# Patient Record
Sex: MALE | Race: WHITE | NOT HISPANIC OR LATINO | Employment: OTHER | URBAN - METROPOLITAN AREA
[De-identification: names, ages, dates, MRNs, and addresses within clinical notes are randomized per-mention and may not be internally consistent; named-entity substitution may affect disease eponyms.]

---

## 2023-08-07 ENCOUNTER — HOSPITAL ENCOUNTER (OUTPATIENT)
Dept: DATA CONVERSION | Facility: HOSPITAL | Age: 78
Discharge: HOME | End: 2023-08-07

## 2023-08-07 DIAGNOSIS — R31.0 GROSS HEMATURIA: ICD-10-CM

## 2023-08-07 LAB
BACTERIA SPEC CULT: NORMAL
REPORT STATUS -LH SQ DATA CONVERSION: NORMAL
SERVICE CMNT-IMP: NORMAL
SPECIMEN SOURCE: NORMAL

## 2023-08-14 ENCOUNTER — HOSPITAL ENCOUNTER (OUTPATIENT)
Dept: DATA CONVERSION | Facility: HOSPITAL | Age: 78
Discharge: HOME | End: 2023-08-14

## 2023-08-14 DIAGNOSIS — N39.0 URINARY TRACT INFECTION, SITE NOT SPECIFIED: ICD-10-CM

## 2023-08-14 LAB
BACTERIA SPEC CULT: NORMAL
BACTERIA UR QL AUTO: NEGATIVE
BILIRUB UR QL STRIP.AUTO: NEGATIVE
CLARITY UR: CLEAR
COLOR UR: ABNORMAL
GLUCOSE UR STRIP.AUTO-MCNC: NEGATIVE MG/DL
HGB UR QL STRIP.AUTO: 2 /HPF (ref 0–3)
HGB UR QL: NEGATIVE
HYALINE CASTS UR QL AUTO: ABNORMAL /LPF
KETONES UR QL STRIP.AUTO: NEGATIVE
LEUKOCYTE ESTERASE UR QL STRIP.AUTO: ABNORMAL
MICROSCOPIC (UA): ABNORMAL
NITRITE UR QL STRIP.AUTO: NEGATIVE
PH UR STRIP.AUTO: 7 [PH] (ref 4.6–8)
PROT UR STRIP.AUTO-MCNC: NEGATIVE MG/DL
REPORT STATUS -LH SQ DATA CONVERSION: NORMAL
SERVICE CMNT-IMP: NORMAL
SP GR UR STRIP.AUTO: 1.01 (ref 1–1.03)
SPECIMEN SOURCE: NORMAL
SQUAMOUS UR QL AUTO: ABNORMAL /HPF
URINE CULTURE: ABNORMAL
UROBILINOGEN UR QL STRIP.AUTO: NORMAL MG/DL (ref 0–1)
WBC #/AREA URNS AUTO: 6 /HPF (ref 0–3)

## 2023-10-25 DIAGNOSIS — I10 PRIMARY HYPERTENSION: ICD-10-CM

## 2023-10-25 RX ORDER — METOPROLOL SUCCINATE 50 MG/1
50 TABLET, EXTENDED RELEASE ORAL DAILY
Qty: 90 TABLET | Refills: 0 | Status: SHIPPED | OUTPATIENT
Start: 2023-10-25 | End: 2024-01-22

## 2023-11-25 DIAGNOSIS — I48.91 ATRIAL FIBRILLATION, UNSPECIFIED TYPE (MULTI): Primary | ICD-10-CM

## 2023-11-27 RX ORDER — WARFARIN SODIUM 5 MG/1
TABLET ORAL
Qty: 180 TABLET | Refills: 0 | Status: SHIPPED | OUTPATIENT
Start: 2023-11-27 | End: 2024-02-19

## 2023-12-22 DIAGNOSIS — B37.9 CANDIDIASIS: Primary | ICD-10-CM

## 2023-12-22 RX ORDER — NYSTATIN 100000 [USP'U]/G
1 POWDER TOPICAL 2 TIMES DAILY
Qty: 30 G | Refills: 1 | Status: SHIPPED | OUTPATIENT
Start: 2023-12-22

## 2023-12-22 RX ORDER — NYSTATIN 100000 [USP'U]/G
1 POWDER TOPICAL 2 TIMES DAILY
COMMUNITY
End: 2023-12-22 | Stop reason: SDUPTHER

## 2024-01-10 ENCOUNTER — OFFICE VISIT (OUTPATIENT)
Dept: PRIMARY CARE | Facility: CLINIC | Age: 79
End: 2024-01-10
Payer: MEDICARE

## 2024-01-10 VITALS
HEART RATE: 64 BPM | DIASTOLIC BLOOD PRESSURE: 78 MMHG | BODY MASS INDEX: 27.98 KG/M2 | WEIGHT: 195 LBS | OXYGEN SATURATION: 97 % | SYSTOLIC BLOOD PRESSURE: 126 MMHG

## 2024-01-10 DIAGNOSIS — E78.00 HIGH CHOLESTEROL: ICD-10-CM

## 2024-01-10 DIAGNOSIS — E03.9 ACQUIRED HYPOTHYROIDISM: ICD-10-CM

## 2024-01-10 DIAGNOSIS — J42 CHRONIC BRONCHITIS, UNSPECIFIED CHRONIC BRONCHITIS TYPE (MULTI): Primary | ICD-10-CM

## 2024-01-10 DIAGNOSIS — I48.11 LONGSTANDING PERSISTENT ATRIAL FIBRILLATION (MULTI): ICD-10-CM

## 2024-01-10 LAB
POC INR: 2.4
POC PROTHROMBIN TIME: 28.5
POCT INTERNATIONAL NORMALIZATION RATIO: 2.4
POCT PROTHROMBIN TIME: 28.5 SECONDS

## 2024-01-10 PROCEDURE — 85610 PROTHROMBIN TIME: CPT | Mod: QW | Performed by: FAMILY MEDICINE

## 2024-01-10 PROCEDURE — 99214 OFFICE O/P EST MOD 30 MIN: CPT | Performed by: FAMILY MEDICINE

## 2024-01-10 PROCEDURE — 1126F AMNT PAIN NOTED NONE PRSNT: CPT | Performed by: FAMILY MEDICINE

## 2024-01-10 PROCEDURE — 1036F TOBACCO NON-USER: CPT | Performed by: FAMILY MEDICINE

## 2024-01-10 PROCEDURE — 1159F MED LIST DOCD IN RCRD: CPT | Performed by: FAMILY MEDICINE

## 2024-01-10 RX ORDER — ROSUVASTATIN CALCIUM 5 MG/1
5 TABLET, COATED ORAL DAILY
COMMUNITY
Start: 2018-12-19

## 2024-01-10 RX ORDER — WARFARIN 7.5 MG/1
7.5 TABLET ORAL
COMMUNITY

## 2024-01-10 RX ORDER — ACETAMINOPHEN 500 MG
5000 TABLET ORAL DAILY
COMMUNITY
Start: 2016-11-08

## 2024-01-10 RX ORDER — LEVOTHYROXINE SODIUM 112 UG/1
112 TABLET ORAL
COMMUNITY
End: 2024-03-13

## 2024-01-10 RX ORDER — WARFARIN 2.5 MG/1
2.5 TABLET ORAL EVERY 24 HOURS
COMMUNITY

## 2024-01-10 RX ORDER — LISINOPRIL 2.5 MG/1
2.5 TABLET ORAL DAILY
COMMUNITY
End: 2024-03-13

## 2024-01-10 ASSESSMENT — ENCOUNTER SYMPTOMS
BLOOD IN STOOL: 0
LOSS OF SENSATION IN FEET: 0
PALPITATIONS: 0
ACTIVITY CHANGE: 0
DIZZINESS: 0
OCCASIONAL FEELINGS OF UNSTEADINESS: 0
HEADACHES: 0
DIARRHEA: 0
COUGH: 0
SHORTNESS OF BREATH: 0
CONSTIPATION: 0
CHEST TIGHTNESS: 0

## 2024-01-10 ASSESSMENT — PATIENT HEALTH QUESTIONNAIRE - PHQ9
1. LITTLE INTEREST OR PLEASURE IN DOING THINGS: NOT AT ALL
2. FEELING DOWN, DEPRESSED OR HOPELESS: NOT AT ALL
SUM OF ALL RESPONSES TO PHQ9 QUESTIONS 1 AND 2: 0

## 2024-01-10 ASSESSMENT — PAIN SCALES - GENERAL: PAINLEVEL: 0-NO PAIN

## 2024-01-10 NOTE — PROGRESS NOTES
Subjective   Patient ID: Royce Briggs is a 78 y.o. male who presents for Hypertension and INR Check.    He presents today for follow-up on his chronic medical conditions.  States overall he is feeling well.  His INR was 2.4 today.    He states he is able to do what he needs to do.  Yesterday woke with some pain shooting down his leg.  He took some over-the-counter medications and now that is gone today.  He is getting around well.         Review of Systems   Constitutional:  Negative for activity change.   Respiratory:  Negative for cough, chest tightness and shortness of breath.    Cardiovascular:  Negative for chest pain, palpitations and leg swelling.   Gastrointestinal:  Negative for blood in stool, constipation and diarrhea.   Neurological:  Negative for dizziness and headaches.       Objective   /78   Pulse 64   Wt 88.5 kg (195 lb)   SpO2 97%   BMI 27.98 kg/m²     Physical Exam  Constitutional:       Appearance: Normal appearance.   Neck:      Thyroid: No thyromegaly or thyroid tenderness.      Vascular: No carotid bruit.   Cardiovascular:      Rate and Rhythm: Normal rate and regular rhythm.      Heart sounds: No murmur heard.  Pulmonary:      Effort: Pulmonary effort is normal.      Breath sounds: Normal breath sounds.   Musculoskeletal:      Cervical back: Neck supple.   Neurological:      Mental Status: He is alert.   Psychiatric:         Mood and Affect: Mood normal.         Assessment/Plan   Diagnoses and all orders for this visit:  Chronic bronchitis, unspecified chronic bronchitis type (CMS/HCC)  Longstanding persistent atrial fibrillation (CMS/HCC)  -     POCT PT/INR  High cholesterol  -     Lipid Panel; Future  -     Comprehensive Metabolic Panel; Future  Acquired hypothyroidism  -     TSH with reflex to Free T4 if abnormal; Future  Doing well.  He will continue his current medications.  I will call him with lab results.

## 2024-01-18 ENCOUNTER — LAB (OUTPATIENT)
Dept: LAB | Facility: LAB | Age: 79
End: 2024-01-18
Payer: MEDICARE

## 2024-01-18 DIAGNOSIS — E03.9 ACQUIRED HYPOTHYROIDISM: ICD-10-CM

## 2024-01-18 DIAGNOSIS — E78.00 HIGH CHOLESTEROL: ICD-10-CM

## 2024-01-18 LAB
ALBUMIN SERPL BCP-MCNC: 4.1 G/DL (ref 3.4–5)
ALP SERPL-CCNC: 80 U/L (ref 33–136)
ALT SERPL W P-5'-P-CCNC: 17 U/L (ref 10–52)
ANION GAP SERPL CALC-SCNC: 11 MMOL/L (ref 10–20)
AST SERPL W P-5'-P-CCNC: 22 U/L (ref 9–39)
BILIRUB SERPL-MCNC: 0.8 MG/DL (ref 0–1.2)
BUN SERPL-MCNC: 18 MG/DL (ref 6–23)
CALCIUM SERPL-MCNC: 9.1 MG/DL (ref 8.6–10.3)
CHLORIDE SERPL-SCNC: 105 MMOL/L (ref 98–107)
CHOLEST SERPL-MCNC: 228 MG/DL (ref 0–199)
CHOLESTEROL/HDL RATIO: 5
CO2 SERPL-SCNC: 28 MMOL/L (ref 21–32)
CREAT SERPL-MCNC: 0.92 MG/DL (ref 0.5–1.3)
EGFRCR SERPLBLD CKD-EPI 2021: 85 ML/MIN/1.73M*2
GLUCOSE SERPL-MCNC: 91 MG/DL (ref 74–99)
HDLC SERPL-MCNC: 45.9 MG/DL
LDLC SERPL CALC-MCNC: 154 MG/DL
NON HDL CHOLESTEROL: 182 MG/DL (ref 0–149)
POTASSIUM SERPL-SCNC: 4.1 MMOL/L (ref 3.5–5.3)
PROT SERPL-MCNC: 6.6 G/DL (ref 6.4–8.2)
SODIUM SERPL-SCNC: 140 MMOL/L (ref 136–145)
TRIGL SERPL-MCNC: 139 MG/DL (ref 0–149)
TSH SERPL-ACNC: 1.78 MIU/L (ref 0.44–3.98)
VLDL: 28 MG/DL (ref 0–40)

## 2024-01-18 PROCEDURE — 36415 COLL VENOUS BLD VENIPUNCTURE: CPT

## 2024-01-21 DIAGNOSIS — I10 PRIMARY HYPERTENSION: ICD-10-CM

## 2024-01-22 RX ORDER — METOPROLOL SUCCINATE 50 MG/1
50 TABLET, EXTENDED RELEASE ORAL DAILY
Qty: 90 TABLET | Refills: 1 | Status: SHIPPED | OUTPATIENT
Start: 2024-01-22

## 2024-02-18 DIAGNOSIS — I48.91 ATRIAL FIBRILLATION, UNSPECIFIED TYPE (MULTI): ICD-10-CM

## 2024-02-19 RX ORDER — WARFARIN SODIUM 5 MG/1
TABLET ORAL
Qty: 180 TABLET | Refills: 0 | Status: SHIPPED | OUTPATIENT
Start: 2024-02-19 | End: 2024-05-16

## 2024-03-06 NOTE — TELEPHONE ENCOUNTER
Last OV 1/10  No future appts scheduled  Last refill  11/27/23  Last INR 1/10/24   2.4    
What Type Of Note Output Would You Prefer (Optional)?: Standard Output
What Is The Reason For Today's Visit?: Full Body Skin Examination
What Is The Reason For Today's Visit? (Being Monitored For X): concerning skin lesions on an annual basis

## 2024-03-13 DIAGNOSIS — I48.11 LONGSTANDING PERSISTENT ATRIAL FIBRILLATION (MULTI): Primary | ICD-10-CM

## 2024-03-13 DIAGNOSIS — E03.9 HYPOTHYROIDISM, UNSPECIFIED: ICD-10-CM

## 2024-03-13 RX ORDER — LEVOTHYROXINE SODIUM 112 UG/1
112 TABLET ORAL
Qty: 90 TABLET | Refills: 3 | Status: SHIPPED | OUTPATIENT
Start: 2024-03-13 | End: 2025-03-08

## 2024-03-13 RX ORDER — LISINOPRIL 2.5 MG/1
2.5 TABLET ORAL DAILY
Qty: 90 TABLET | Refills: 1 | Status: SHIPPED | OUTPATIENT
Start: 2024-03-13

## 2024-05-07 ENCOUNTER — TELEPHONE (OUTPATIENT)
Dept: PRIMARY CARE | Facility: CLINIC | Age: 79
End: 2024-05-07
Payer: MEDICARE

## 2024-05-07 NOTE — TELEPHONE ENCOUNTER
Pt calls, he states he is still having some issues with his stools, some are soft, when he passes gas sometimes some leakage. He has been using the Imodium but not on a daily basis- when he does not take it he gets the watery release when he passes gas. He has a dog that had loose stools and he wants to know if he may have picked something up from the dog. He wants to know if there is testing that needs to be done - please advise.  OR do you want the patient to come in for an office appointment?

## 2024-05-16 DIAGNOSIS — I48.91 ATRIAL FIBRILLATION, UNSPECIFIED TYPE (MULTI): ICD-10-CM

## 2024-05-16 RX ORDER — WARFARIN SODIUM 5 MG/1
10 TABLET ORAL
Qty: 180 TABLET | Refills: 0 | Status: SHIPPED | OUTPATIENT
Start: 2024-05-16

## 2024-05-24 ENCOUNTER — LAB (OUTPATIENT)
Dept: LAB | Facility: LAB | Age: 79
End: 2024-05-24
Payer: MEDICARE

## 2024-05-24 DIAGNOSIS — R19.5 OTHER FECAL ABNORMALITIES: Primary | ICD-10-CM

## 2024-05-24 PROCEDURE — 87329 GIARDIA AG IA: CPT

## 2024-05-24 PROCEDURE — 87328 CRYPTOSPORIDIUM AG IA: CPT

## 2024-05-24 PROCEDURE — 87506 IADNA-DNA/RNA PROBE TQ 6-11: CPT

## 2024-05-25 LAB

## 2024-05-28 LAB
CRYPTOSP AG STL QL IA: NEGATIVE
G LAMBLIA AG STL QL IA: NEGATIVE
O+P STL MICRO: NEGATIVE

## 2024-07-01 ENCOUNTER — APPOINTMENT (OUTPATIENT)
Dept: RADIOLOGY | Facility: HOSPITAL | Age: 79
End: 2024-07-01
Payer: MEDICARE

## 2024-07-01 ENCOUNTER — HOSPITAL ENCOUNTER (EMERGENCY)
Facility: HOSPITAL | Age: 79
Discharge: OTHER INSTITUTION | End: 2024-07-02
Payer: MEDICARE

## 2024-07-01 DIAGNOSIS — N30.01 ACUTE CYSTITIS WITH HEMATURIA: ICD-10-CM

## 2024-07-01 DIAGNOSIS — N32.89 BLADDER MASS: Primary | ICD-10-CM

## 2024-07-01 LAB
ALBUMIN SERPL BCP-MCNC: 4.6 G/DL (ref 3.4–5)
ALP SERPL-CCNC: 85 U/L (ref 33–136)
ALT SERPL W P-5'-P-CCNC: 23 U/L (ref 10–52)
ANION GAP SERPL CALC-SCNC: 12 MMOL/L (ref 10–20)
APPEARANCE UR: ABNORMAL
AST SERPL W P-5'-P-CCNC: 32 U/L (ref 9–39)
BACTERIA #/AREA URNS AUTO: ABNORMAL /HPF
BASOPHILS # BLD AUTO: 0.05 X10*3/UL (ref 0–0.1)
BASOPHILS NFR BLD AUTO: 0.6 %
BILIRUB SERPL-MCNC: 0.6 MG/DL (ref 0–1.2)
BILIRUB UR STRIP.AUTO-MCNC: NEGATIVE MG/DL
BUN SERPL-MCNC: 16 MG/DL (ref 6–23)
CALCIUM SERPL-MCNC: 10 MG/DL (ref 8.6–10.3)
CHLORIDE SERPL-SCNC: 101 MMOL/L (ref 98–107)
CO2 SERPL-SCNC: 29 MMOL/L (ref 21–32)
COLOR UR: ABNORMAL
CREAT SERPL-MCNC: 0.98 MG/DL (ref 0.5–1.3)
EGFRCR SERPLBLD CKD-EPI 2021: 79 ML/MIN/1.73M*2
EOSINOPHIL # BLD AUTO: 0.45 X10*3/UL (ref 0–0.4)
EOSINOPHIL NFR BLD AUTO: 5.8 %
ERYTHROCYTE [DISTWIDTH] IN BLOOD BY AUTOMATED COUNT: 12.8 % (ref 11.5–14.5)
GLUCOSE SERPL-MCNC: 99 MG/DL (ref 74–99)
GLUCOSE UR STRIP.AUTO-MCNC: NORMAL MG/DL
HCT VFR BLD AUTO: 37.1 % (ref 41–52)
HGB BLD-MCNC: 12.5 G/DL (ref 13.5–17.5)
IMM GRANULOCYTES # BLD AUTO: 0.02 X10*3/UL (ref 0–0.5)
IMM GRANULOCYTES NFR BLD AUTO: 0.3 % (ref 0–0.9)
INR PPP: 1 (ref 0.9–1.1)
KETONES UR STRIP.AUTO-MCNC: NEGATIVE MG/DL
LEUKOCYTE ESTERASE UR QL STRIP.AUTO: ABNORMAL
LYMPHOCYTES # BLD AUTO: 1.98 X10*3/UL (ref 0.8–3)
LYMPHOCYTES NFR BLD AUTO: 25.5 %
MCH RBC QN AUTO: 33.4 PG (ref 26–34)
MCHC RBC AUTO-ENTMCNC: 33.7 G/DL (ref 32–36)
MCV RBC AUTO: 99 FL (ref 80–100)
MONOCYTES # BLD AUTO: 0.63 X10*3/UL (ref 0.05–0.8)
MONOCYTES NFR BLD AUTO: 8.1 %
NEUTROPHILS # BLD AUTO: 4.62 X10*3/UL (ref 1.6–5.5)
NEUTROPHILS NFR BLD AUTO: 59.7 %
NITRITE UR QL STRIP.AUTO: NEGATIVE
NRBC BLD-RTO: 0 /100 WBCS (ref 0–0)
PH UR STRIP.AUTO: 6.5 [PH]
PLATELET # BLD AUTO: 259 X10*3/UL (ref 150–450)
POTASSIUM SERPL-SCNC: 4 MMOL/L (ref 3.5–5.3)
PROT SERPL-MCNC: 7.9 G/DL (ref 6.4–8.2)
PROT UR STRIP.AUTO-MCNC: ABNORMAL MG/DL
PROTHROMBIN TIME: 11.6 SECONDS (ref 9.8–12.8)
RBC # BLD AUTO: 3.74 X10*6/UL (ref 4.5–5.9)
RBC # UR STRIP.AUTO: ABNORMAL /UL
RBC #/AREA URNS AUTO: >20 /HPF
SODIUM SERPL-SCNC: 138 MMOL/L (ref 136–145)
SP GR UR STRIP.AUTO: 1
UROBILINOGEN UR STRIP.AUTO-MCNC: NORMAL MG/DL
WBC # BLD AUTO: 7.8 X10*3/UL (ref 4.4–11.3)
WBC #/AREA URNS AUTO: ABNORMAL /HPF
WBC CLUMPS #/AREA URNS AUTO: ABNORMAL /HPF

## 2024-07-01 PROCEDURE — 74177 CT ABD & PELVIS W/CONTRAST: CPT | Performed by: STUDENT IN AN ORGANIZED HEALTH CARE EDUCATION/TRAINING PROGRAM

## 2024-07-01 PROCEDURE — 2500000004 HC RX 250 GENERAL PHARMACY W/ HCPCS (ALT 636 FOR OP/ED): Performed by: PHYSICIAN ASSISTANT

## 2024-07-01 PROCEDURE — 85610 PROTHROMBIN TIME: CPT | Performed by: PHYSICIAN ASSISTANT

## 2024-07-01 PROCEDURE — 96365 THER/PROPH/DIAG IV INF INIT: CPT

## 2024-07-01 PROCEDURE — 36415 COLL VENOUS BLD VENIPUNCTURE: CPT | Performed by: PHYSICIAN ASSISTANT

## 2024-07-01 PROCEDURE — 87086 URINE CULTURE/COLONY COUNT: CPT | Mod: GENLAB | Performed by: PHYSICIAN ASSISTANT

## 2024-07-01 PROCEDURE — 2550000001 HC RX 255 CONTRASTS: Performed by: PHYSICIAN ASSISTANT

## 2024-07-01 PROCEDURE — 80053 COMPREHEN METABOLIC PANEL: CPT | Performed by: PHYSICIAN ASSISTANT

## 2024-07-01 PROCEDURE — 85025 COMPLETE CBC W/AUTO DIFF WBC: CPT | Performed by: PHYSICIAN ASSISTANT

## 2024-07-01 PROCEDURE — 81003 URINALYSIS AUTO W/O SCOPE: CPT | Performed by: PHYSICIAN ASSISTANT

## 2024-07-01 PROCEDURE — 99285 EMERGENCY DEPT VISIT HI MDM: CPT | Mod: 25

## 2024-07-01 PROCEDURE — 74177 CT ABD & PELVIS W/CONTRAST: CPT

## 2024-07-01 RX ORDER — CEFTRIAXONE 1 G/50ML
1 INJECTION, SOLUTION INTRAVENOUS ONCE
Status: COMPLETED | OUTPATIENT
Start: 2024-07-01 | End: 2024-07-01

## 2024-07-01 RX ORDER — CEPHALEXIN 250 MG/1
500 CAPSULE ORAL ONCE
Status: DISCONTINUED | OUTPATIENT
Start: 2024-07-01 | End: 2024-07-01

## 2024-07-01 ASSESSMENT — PAIN SCALES - GENERAL
PAINLEVEL_OUTOF10: 0 - NO PAIN
PAINLEVEL_OUTOF10: 0 - NO PAIN

## 2024-07-01 ASSESSMENT — PAIN - FUNCTIONAL ASSESSMENT: PAIN_FUNCTIONAL_ASSESSMENT: 0-10

## 2024-07-02 ENCOUNTER — HOSPITAL ENCOUNTER (INPATIENT)
Facility: HOSPITAL | Age: 79
LOS: 1 days | Discharge: HOME | DRG: 669 | End: 2024-07-04
Attending: INTERNAL MEDICINE | Admitting: FAMILY MEDICINE
Payer: MEDICARE

## 2024-07-02 ENCOUNTER — ANESTHESIA EVENT (OUTPATIENT)
Dept: OPERATING ROOM | Facility: HOSPITAL | Age: 79
End: 2024-07-02
Payer: MEDICARE

## 2024-07-02 ENCOUNTER — ANESTHESIA (OUTPATIENT)
Dept: OPERATING ROOM | Facility: HOSPITAL | Age: 79
End: 2024-07-02
Payer: MEDICARE

## 2024-07-02 ENCOUNTER — APPOINTMENT (OUTPATIENT)
Dept: CARDIOLOGY | Facility: HOSPITAL | Age: 79
DRG: 669 | End: 2024-07-02
Payer: MEDICARE

## 2024-07-02 VITALS
SYSTOLIC BLOOD PRESSURE: 150 MMHG | HEART RATE: 78 BPM | RESPIRATION RATE: 18 BRPM | OXYGEN SATURATION: 97 % | BODY MASS INDEX: 25.18 KG/M2 | DIASTOLIC BLOOD PRESSURE: 78 MMHG | TEMPERATURE: 97.7 F | WEIGHT: 170 LBS | HEIGHT: 69 IN

## 2024-07-02 DIAGNOSIS — R01.1 HEART MURMUR: ICD-10-CM

## 2024-07-02 DIAGNOSIS — D49.4 BLADDER TUMOR: Primary | ICD-10-CM

## 2024-07-02 PROBLEM — M51.36 DDD (DEGENERATIVE DISC DISEASE), LUMBAR: Status: ACTIVE | Noted: 2024-07-02

## 2024-07-02 PROBLEM — E03.9 HYPOTHYROIDISM: Status: ACTIVE | Noted: 2023-02-28

## 2024-07-02 PROBLEM — I48.91 ATRIAL FIBRILLATION (MULTI): Status: ACTIVE | Noted: 2024-07-02

## 2024-07-02 PROBLEM — N32.89 BLADDER MASS: Status: ACTIVE | Noted: 2024-07-02

## 2024-07-02 PROBLEM — I10 HYPERTENSION: Status: ACTIVE | Noted: 2023-02-28

## 2024-07-02 PROBLEM — E78.00 HYPERCHOLESTEROLEMIA: Status: ACTIVE | Noted: 2024-07-02

## 2024-07-02 PROBLEM — K40.90 LEFT INGUINAL HERNIA: Status: ACTIVE | Noted: 2024-07-02

## 2024-07-02 PROBLEM — M87.059 AVASCULAR NECROSIS OF BONE OF HIP (MULTI): Status: ACTIVE | Noted: 2024-07-02

## 2024-07-02 PROBLEM — M51.369 DDD (DEGENERATIVE DISC DISEASE), LUMBAR: Status: ACTIVE | Noted: 2024-07-02

## 2024-07-02 LAB
ANION GAP SERPL CALC-SCNC: 10 MMOL/L (ref 10–20)
ATRIAL RATE: 53 BPM
BUN SERPL-MCNC: 16 MG/DL (ref 6–23)
CALCIUM SERPL-MCNC: 9 MG/DL (ref 8.6–10.3)
CHLORIDE SERPL-SCNC: 103 MMOL/L (ref 98–107)
CO2 SERPL-SCNC: 30 MMOL/L (ref 21–32)
CREAT SERPL-MCNC: 1.01 MG/DL (ref 0.5–1.3)
EGFRCR SERPLBLD CKD-EPI 2021: 76 ML/MIN/1.73M*2
ERYTHROCYTE [DISTWIDTH] IN BLOOD BY AUTOMATED COUNT: 12.8 % (ref 11.5–14.5)
GLUCOSE SERPL-MCNC: 98 MG/DL (ref 74–99)
HCT VFR BLD AUTO: 34.4 % (ref 41–52)
HGB BLD-MCNC: 11.3 G/DL (ref 13.5–17.5)
MAGNESIUM SERPL-MCNC: 2 MG/DL (ref 1.6–2.4)
MCH RBC QN AUTO: 32.8 PG (ref 26–34)
MCHC RBC AUTO-ENTMCNC: 32.8 G/DL (ref 32–36)
MCV RBC AUTO: 100 FL (ref 80–100)
NRBC BLD-RTO: 0 /100 WBCS (ref 0–0)
P AXIS: 12 DEGREES
P OFFSET: 198 MS
P ONSET: 144 MS
PLATELET # BLD AUTO: 207 X10*3/UL (ref 150–450)
POTASSIUM SERPL-SCNC: 3.9 MMOL/L (ref 3.5–5.3)
PR INTERVAL: 152 MS
Q ONSET: 220 MS
QRS COUNT: 8 BEATS
QRS DURATION: 92 MS
QT INTERVAL: 410 MS
QTC CALCULATION(BAZETT): 384 MS
QTC FREDERICIA: 393 MS
R AXIS: -11 DEGREES
RBC # BLD AUTO: 3.44 X10*6/UL (ref 4.5–5.9)
SODIUM SERPL-SCNC: 139 MMOL/L (ref 136–145)
T AXIS: -3 DEGREES
T OFFSET: 425 MS
VENTRICULAR RATE: 53 BPM
WBC # BLD AUTO: 7.2 X10*3/UL (ref 4.4–11.3)

## 2024-07-02 PROCEDURE — 36415 COLL VENOUS BLD VENIPUNCTURE: CPT | Performed by: INTERNAL MEDICINE

## 2024-07-02 PROCEDURE — 3700000002 HC GENERAL ANESTHESIA TIME - EACH INCREMENTAL 1 MINUTE: Performed by: STUDENT IN AN ORGANIZED HEALTH CARE EDUCATION/TRAINING PROGRAM

## 2024-07-02 PROCEDURE — 2500000004 HC RX 250 GENERAL PHARMACY W/ HCPCS (ALT 636 FOR OP/ED): Performed by: INTERNAL MEDICINE

## 2024-07-02 PROCEDURE — 2720000007 HC OR 272 NO HCPCS: Performed by: STUDENT IN AN ORGANIZED HEALTH CARE EDUCATION/TRAINING PROGRAM

## 2024-07-02 PROCEDURE — 93005 ELECTROCARDIOGRAM TRACING: CPT

## 2024-07-02 PROCEDURE — 2500000005 HC RX 250 GENERAL PHARMACY W/O HCPCS: Performed by: STUDENT IN AN ORGANIZED HEALTH CARE EDUCATION/TRAINING PROGRAM

## 2024-07-02 PROCEDURE — 2500000005 HC RX 250 GENERAL PHARMACY W/O HCPCS: Performed by: NURSE ANESTHETIST, CERTIFIED REGISTERED

## 2024-07-02 PROCEDURE — 7100000001 HC RECOVERY ROOM TIME - INITIAL BASE CHARGE: Performed by: STUDENT IN AN ORGANIZED HEALTH CARE EDUCATION/TRAINING PROGRAM

## 2024-07-02 PROCEDURE — 80048 BASIC METABOLIC PNL TOTAL CA: CPT | Performed by: INTERNAL MEDICINE

## 2024-07-02 PROCEDURE — G0378 HOSPITAL OBSERVATION PER HR: HCPCS

## 2024-07-02 PROCEDURE — 85027 COMPLETE CBC AUTOMATED: CPT | Performed by: INTERNAL MEDICINE

## 2024-07-02 PROCEDURE — 3600000003 HC OR TIME - INITIAL BASE CHARGE - PROCEDURE LEVEL THREE: Performed by: STUDENT IN AN ORGANIZED HEALTH CARE EDUCATION/TRAINING PROGRAM

## 2024-07-02 PROCEDURE — 0TBB8ZZ EXCISION OF BLADDER, VIA NATURAL OR ARTIFICIAL OPENING ENDOSCOPIC: ICD-10-PCS | Performed by: STUDENT IN AN ORGANIZED HEALTH CARE EDUCATION/TRAINING PROGRAM

## 2024-07-02 PROCEDURE — 7100000002 HC RECOVERY ROOM TIME - EACH INCREMENTAL 1 MINUTE: Performed by: STUDENT IN AN ORGANIZED HEALTH CARE EDUCATION/TRAINING PROGRAM

## 2024-07-02 PROCEDURE — 99223 1ST HOSP IP/OBS HIGH 75: CPT | Performed by: INTERNAL MEDICINE

## 2024-07-02 PROCEDURE — 3700000001 HC GENERAL ANESTHESIA TIME - INITIAL BASE CHARGE: Performed by: STUDENT IN AN ORGANIZED HEALTH CARE EDUCATION/TRAINING PROGRAM

## 2024-07-02 PROCEDURE — 99222 1ST HOSP IP/OBS MODERATE 55: CPT | Performed by: STUDENT IN AN ORGANIZED HEALTH CARE EDUCATION/TRAINING PROGRAM

## 2024-07-02 PROCEDURE — 83735 ASSAY OF MAGNESIUM: CPT | Performed by: NURSE PRACTITIONER

## 2024-07-02 PROCEDURE — 52235 CYSTOSCOPY AND TREATMENT: CPT | Performed by: STUDENT IN AN ORGANIZED HEALTH CARE EDUCATION/TRAINING PROGRAM

## 2024-07-02 PROCEDURE — 3600000008 HC OR TIME - EACH INCREMENTAL 1 MINUTE - PROCEDURE LEVEL THREE: Performed by: STUDENT IN AN ORGANIZED HEALTH CARE EDUCATION/TRAINING PROGRAM

## 2024-07-02 PROCEDURE — 2500000004 HC RX 250 GENERAL PHARMACY W/ HCPCS (ALT 636 FOR OP/ED): Performed by: NURSE ANESTHETIST, CERTIFIED REGISTERED

## 2024-07-02 PROCEDURE — 2500000001 HC RX 250 WO HCPCS SELF ADMINISTERED DRUGS (ALT 637 FOR MEDICARE OP): Performed by: INTERNAL MEDICINE

## 2024-07-02 RX ORDER — DOCUSATE SODIUM 100 MG/1
100 CAPSULE, LIQUID FILLED ORAL 2 TIMES DAILY
Status: DISCONTINUED | OUTPATIENT
Start: 2024-07-02 | End: 2024-07-04 | Stop reason: HOSPADM

## 2024-07-02 RX ORDER — ONDANSETRON HYDROCHLORIDE 2 MG/ML
4 INJECTION, SOLUTION INTRAVENOUS EVERY 8 HOURS PRN
Status: DISCONTINUED | OUTPATIENT
Start: 2024-07-02 | End: 2024-07-04 | Stop reason: HOSPADM

## 2024-07-02 RX ORDER — ACETAMINOPHEN 325 MG/1
650 TABLET ORAL EVERY 4 HOURS PRN
Status: DISCONTINUED | OUTPATIENT
Start: 2024-07-02 | End: 2024-07-04 | Stop reason: HOSPADM

## 2024-07-02 RX ORDER — DEXTROSE MONOHYDRATE AND SODIUM CHLORIDE 5; .9 G/100ML; G/100ML
50 INJECTION, SOLUTION INTRAVENOUS CONTINUOUS
Status: DISCONTINUED | OUTPATIENT
Start: 2024-07-02 | End: 2024-07-04 | Stop reason: HOSPADM

## 2024-07-02 RX ORDER — GUAIFENESIN 600 MG/1
600 TABLET, EXTENDED RELEASE ORAL EVERY 12 HOURS PRN
Status: DISCONTINUED | OUTPATIENT
Start: 2024-07-02 | End: 2024-07-04 | Stop reason: HOSPADM

## 2024-07-02 RX ORDER — GUAIFENESIN/DEXTROMETHORPHAN 100-10MG/5
5 SYRUP ORAL EVERY 4 HOURS PRN
Status: DISCONTINUED | OUTPATIENT
Start: 2024-07-02 | End: 2024-07-04 | Stop reason: HOSPADM

## 2024-07-02 RX ORDER — CHOLECALCIFEROL (VITAMIN D3) 125 MCG
5000 CAPSULE ORAL DAILY
Status: DISCONTINUED | OUTPATIENT
Start: 2024-07-02 | End: 2024-07-04 | Stop reason: HOSPADM

## 2024-07-02 RX ORDER — ACETAMINOPHEN 650 MG/1
650 SUPPOSITORY RECTAL EVERY 4 HOURS PRN
Status: DISCONTINUED | OUTPATIENT
Start: 2024-07-02 | End: 2024-07-04 | Stop reason: HOSPADM

## 2024-07-02 RX ORDER — PROPOFOL 10 MG/ML
INJECTION, EMULSION INTRAVENOUS AS NEEDED
Status: DISCONTINUED | OUTPATIENT
Start: 2024-07-02 | End: 2024-07-02

## 2024-07-02 RX ORDER — CEFTRIAXONE 1 G/50ML
1 INJECTION, SOLUTION INTRAVENOUS EVERY 12 HOURS
Status: DISCONTINUED | OUTPATIENT
Start: 2024-07-02 | End: 2024-07-04 | Stop reason: HOSPADM

## 2024-07-02 RX ORDER — ALUMINUM HYDROXIDE, MAGNESIUM HYDROXIDE, AND SIMETHICONE 1200; 120; 1200 MG/30ML; MG/30ML; MG/30ML
30 SUSPENSION ORAL EVERY 6 HOURS PRN
Status: DISCONTINUED | OUTPATIENT
Start: 2024-07-02 | End: 2024-07-04 | Stop reason: HOSPADM

## 2024-07-02 RX ORDER — LEVOTHYROXINE SODIUM 112 UG/1
112 TABLET ORAL
Status: DISCONTINUED | OUTPATIENT
Start: 2024-07-02 | End: 2024-07-04 | Stop reason: HOSPADM

## 2024-07-02 RX ORDER — METOPROLOL SUCCINATE 50 MG/1
50 TABLET, EXTENDED RELEASE ORAL DAILY
Status: DISCONTINUED | OUTPATIENT
Start: 2024-07-02 | End: 2024-07-04 | Stop reason: HOSPADM

## 2024-07-02 RX ORDER — SODIUM CHLORIDE 0.9 G/100ML
IRRIGANT IRRIGATION AS NEEDED
Status: DISCONTINUED | OUTPATIENT
Start: 2024-07-02 | End: 2024-07-02 | Stop reason: HOSPADM

## 2024-07-02 RX ORDER — ROSUVASTATIN CALCIUM 10 MG/1
5 TABLET, COATED ORAL DAILY
Status: DISCONTINUED | OUTPATIENT
Start: 2024-07-02 | End: 2024-07-04 | Stop reason: HOSPADM

## 2024-07-02 RX ORDER — CALCIUM CARBONATE 200(500)MG
500 TABLET,CHEWABLE ORAL 4 TIMES DAILY PRN
Status: DISCONTINUED | OUTPATIENT
Start: 2024-07-02 | End: 2024-07-04 | Stop reason: HOSPADM

## 2024-07-02 RX ORDER — ACETAMINOPHEN 160 MG/5ML
650 SOLUTION ORAL EVERY 4 HOURS PRN
Status: DISCONTINUED | OUTPATIENT
Start: 2024-07-02 | End: 2024-07-04 | Stop reason: HOSPADM

## 2024-07-02 RX ORDER — NYSTATIN 100000 [USP'U]/G
1 POWDER TOPICAL 2 TIMES DAILY
Status: DISCONTINUED | OUTPATIENT
Start: 2024-07-02 | End: 2024-07-04 | Stop reason: HOSPADM

## 2024-07-02 RX ORDER — ONDANSETRON 4 MG/1
4 TABLET, FILM COATED ORAL EVERY 8 HOURS PRN
Status: DISCONTINUED | OUTPATIENT
Start: 2024-07-02 | End: 2024-07-04 | Stop reason: HOSPADM

## 2024-07-02 RX ORDER — LIDOCAINE HCL/PF 100 MG/5ML
SYRINGE (ML) INTRAVENOUS AS NEEDED
Status: DISCONTINUED | OUTPATIENT
Start: 2024-07-02 | End: 2024-07-02

## 2024-07-02 RX ORDER — ONDANSETRON HYDROCHLORIDE 2 MG/ML
INJECTION, SOLUTION INTRAVENOUS AS NEEDED
Status: DISCONTINUED | OUTPATIENT
Start: 2024-07-02 | End: 2024-07-02

## 2024-07-02 RX ORDER — LISINOPRIL 5 MG/1
2.5 TABLET ORAL DAILY
Status: DISCONTINUED | OUTPATIENT
Start: 2024-07-02 | End: 2024-07-04 | Stop reason: HOSPADM

## 2024-07-02 SDOH — SOCIAL STABILITY: SOCIAL INSECURITY: ABUSE: ADULT

## 2024-07-02 SDOH — SOCIAL STABILITY: SOCIAL INSECURITY: DOES ANYONE TRY TO KEEP YOU FROM HAVING/CONTACTING OTHER FRIENDS OR DOING THINGS OUTSIDE YOUR HOME?: NO

## 2024-07-02 SDOH — SOCIAL STABILITY: SOCIAL INSECURITY: HAS ANYONE EVER THREATENED TO HURT YOUR FAMILY OR YOUR PETS?: NO

## 2024-07-02 SDOH — SOCIAL STABILITY: SOCIAL INSECURITY: ARE YOU OR HAVE YOU BEEN THREATENED OR ABUSED PHYSICALLY, EMOTIONALLY, OR SEXUALLY BY ANYONE?: NO

## 2024-07-02 SDOH — SOCIAL STABILITY: SOCIAL INSECURITY: DO YOU FEEL UNSAFE GOING BACK TO THE PLACE WHERE YOU ARE LIVING?: NO

## 2024-07-02 SDOH — SOCIAL STABILITY: SOCIAL INSECURITY: WERE YOU ABLE TO COMPLETE ALL THE BEHAVIORAL HEALTH SCREENINGS?: YES

## 2024-07-02 SDOH — SOCIAL STABILITY: SOCIAL INSECURITY: ARE THERE ANY APPARENT SIGNS OF INJURIES/BEHAVIORS THAT COULD BE RELATED TO ABUSE/NEGLECT?: NO

## 2024-07-02 SDOH — SOCIAL STABILITY: SOCIAL INSECURITY: HAVE YOU HAD ANY THOUGHTS OF HARMING ANYONE ELSE?: NO

## 2024-07-02 SDOH — HEALTH STABILITY: MENTAL HEALTH: CURRENT SMOKER: 0

## 2024-07-02 SDOH — SOCIAL STABILITY: SOCIAL INSECURITY: DO YOU FEEL ANYONE HAS EXPLOITED OR TAKEN ADVANTAGE OF YOU FINANCIALLY OR OF YOUR PERSONAL PROPERTY?: NO

## 2024-07-02 SDOH — SOCIAL STABILITY: SOCIAL INSECURITY: HAVE YOU HAD THOUGHTS OF HARMING ANYONE ELSE?: NO

## 2024-07-02 ASSESSMENT — ACTIVITIES OF DAILY LIVING (ADL)
ADEQUATE_TO_COMPLETE_ADL: YES
JUDGMENT_ADEQUATE_SAFELY_COMPLETE_DAILY_ACTIVITIES: YES
BATHING: INDEPENDENT
DRESSING YOURSELF: INDEPENDENT
LACK_OF_TRANSPORTATION: NO
ASSISTIVE_DEVICE: EYEGLASSES
LACK_OF_TRANSPORTATION: NO
HEARING - LEFT EAR: DIFFICULTY WITH NOISE
WALKS IN HOME: INDEPENDENT
GROOMING: INDEPENDENT
TOILETING: INDEPENDENT
HEARING - RIGHT EAR: DIFFICULTY WITH NOISE
PATIENT'S MEMORY ADEQUATE TO SAFELY COMPLETE DAILY ACTIVITIES?: YES
FEEDING YOURSELF: INDEPENDENT

## 2024-07-02 ASSESSMENT — COGNITIVE AND FUNCTIONAL STATUS - GENERAL
MOBILITY SCORE: 24
MOBILITY SCORE: 24
DAILY ACTIVITIY SCORE: 24
DAILY ACTIVITIY SCORE: 24
MOBILITY SCORE: 24
DAILY ACTIVITIY SCORE: 24
PATIENT BASELINE BEDBOUND: NO

## 2024-07-02 ASSESSMENT — ENCOUNTER SYMPTOMS
CARDIOVASCULAR NEGATIVE: 1
ABDOMINAL DISTENTION: 0
RESPIRATORY NEGATIVE: 1
COUGH: 0
HEMATURIA: 1
HEMATOLOGIC/LYMPHATIC NEGATIVE: 1
EYES NEGATIVE: 1
WEAKNESS: 0
CONSTITUTIONAL NEGATIVE: 1
ABDOMINAL PAIN: 0
ENDOCRINE NEGATIVE: 1
SHORTNESS OF BREATH: 0
GASTROINTESTINAL NEGATIVE: 1
DIZZINESS: 0
NEUROLOGICAL NEGATIVE: 1
PSYCHIATRIC NEGATIVE: 1
ALLERGIC/IMMUNOLOGIC NEGATIVE: 1
CHILLS: 0
MUSCULOSKELETAL NEGATIVE: 1
FEVER: 0
PALPITATIONS: 0

## 2024-07-02 ASSESSMENT — COLUMBIA-SUICIDE SEVERITY RATING SCALE - C-SSRS
2. HAVE YOU ACTUALLY HAD ANY THOUGHTS OF KILLING YOURSELF?: NO
6. HAVE YOU EVER DONE ANYTHING, STARTED TO DO ANYTHING, OR PREPARED TO DO ANYTHING TO END YOUR LIFE?: NO
1. IN THE PAST MONTH, HAVE YOU WISHED YOU WERE DEAD OR WISHED YOU COULD GO TO SLEEP AND NOT WAKE UP?: NO

## 2024-07-02 ASSESSMENT — PAIN - FUNCTIONAL ASSESSMENT
PAIN_FUNCTIONAL_ASSESSMENT: 0-10
PAIN_FUNCTIONAL_ASSESSMENT: 0-10

## 2024-07-02 ASSESSMENT — PATIENT HEALTH QUESTIONNAIRE - PHQ9
2. FEELING DOWN, DEPRESSED OR HOPELESS: NOT AT ALL
SUM OF ALL RESPONSES TO PHQ9 QUESTIONS 1 & 2: 0
1. LITTLE INTEREST OR PLEASURE IN DOING THINGS: NOT AT ALL

## 2024-07-02 ASSESSMENT — PAIN SCALES - GENERAL
PAINLEVEL_OUTOF10: 0 - NO PAIN
PAINLEVEL_OUTOF10: 2
PAINLEVEL_OUTOF10: 0 - NO PAIN
PAINLEVEL_OUTOF10: 0 - NO PAIN

## 2024-07-02 ASSESSMENT — LIFESTYLE VARIABLES
HOW OFTEN DO YOU HAVE A DRINK CONTAINING ALCOHOL: 2-4 TIMES A MONTH
HOW OFTEN DO YOU HAVE 6 OR MORE DRINKS ON ONE OCCASION: NEVER
HOW MANY STANDARD DRINKS CONTAINING ALCOHOL DO YOU HAVE ON A TYPICAL DAY: 1 OR 2
AUDIT-C TOTAL SCORE: 2
AUDIT-C TOTAL SCORE: 2
SKIP TO QUESTIONS 9-10: 1

## 2024-07-02 NOTE — ANESTHESIA PREPROCEDURE EVALUATION
Patient: Royce Briggs    Procedure Information       Date/Time: 07/02/24 1530    Procedure: Cystoscopy with Ablation Tissue    Location: [facility] / [facility]    Surgeons: Nayeli Bowling MD          Vitals:    07/02/24 1431   BP: 153/81   Pulse: 62   Resp:    Temp: 37.1 °C (98.8 °F)   SpO2: 96%       Past Surgical History:   Procedure Laterality Date    CARDIAC CATHETERIZATION  04/15/2014    Cardiac Cath Procedure Outcome:    COLONOSCOPY W/ POLYPECTOMY  06/27/2024    HERNIA REPAIR  04/15/2014    Inguinal Hernia Repair     Past Medical History:   Diagnosis Date    Atrial fibrillation     Longstanding/Persistant    Bladder tumor 07/01/2024    Right anterolateral bladder dome  2.1 x 1.7 cm    Cardiac murmur     Colon polyp 06/27/2024    COPD (chronic obstructive pulmonary disease) (Multi)     DDD (degenerative disc disease), lumbar     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    Hematospermia     Hematuria 07/01/2024    History of backache 01/14/2014    History of depression 01/14/2014    History of esophageal ulcer 01/14/2014    History of mumps 01/14/2014    History of rectal bleeding     History of rubella 01/14/2014    HLD (hyperlipidemia)     HTN (hypertension)     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    Avascular necrosis of hip, left    Normal cardiac stress test 01/14/2014    On Coumadin for atrial fibrillation     Personal history of other diseases of the circulatory system     History of cardiac disorder    Rhinitis, chronic     Tick bite 04/09/2022    Ulcer 01/14/2014    Undescended testicle     Unspecified abdominal hernia without obstruction or gangrene 01/14/2014    Hernia       Current Facility-Administered Medications:     [Transfer Hold] acetaminophen (Tylenol) tablet 650 mg, 650 mg, oral, q4h PRN **OR** [Transfer Hold] acetaminophen (Tylenol) oral liquid 650 mg, 650 mg, oral, q4h PRN **OR** [Transfer Hold] acetaminophen (Tylenol) suppository 650 mg, 650 mg, rectal, q4h PRN,  Myriam Mattson MD    [Transfer Hold] alum-mag hydroxide-simeth (Mylanta) 200-200-20 mg/5 mL oral suspension 30 mL, 30 mL, oral, q6h PRN, Myriam Mattson MD    [Transfer Hold] calcium carbonate (Tums) chewable tablet 500 mg, 500 mg, oral, 4x daily PRN, Myriam Mattson MD    [Transfer Hold] cefTRIAXone (Rocephin) IVPB 1 g, 1 g, intravenous, q12h, Myriam Mattson MD, Stopped at 07/02/24 1112    [Transfer Hold] cholecalciferol (Vitamin D-3) capsule 125 mcg, 5,000 Units, oral, Daily, Myriam Mattson MD    D5 % and 0.9 % sodium chloride infusion, 50 mL/hr, intravenous, Continuous, Myriam Mattson MD, Last Rate: 50 mL/hr at 07/02/24 0559, 50 mL/hr at 07/02/24 0559    [Transfer Hold] dextromethorphan-guaifenesin (Robitussin DM)  mg/5 mL oral liquid 5 mL, 5 mL, oral, q4h PRN, Myriam Mattson MD    [Transfer Hold] docusate sodium (Colace) capsule 100 mg, 100 mg, oral, BID, Myriam Mattson MD    [Transfer Hold] guaiFENesin (Mucinex) 12 hr tablet 600 mg, 600 mg, oral, q12h PRN, Myriam Mattson MD    [Transfer Hold] levothyroxine (Synthroid, Levoxyl) tablet 112 mcg, 112 mcg, oral, Daily before breakfast, Myriam Mattson MD, 112 mcg at 07/02/24 0601    [Transfer Hold] lisinopril tablet 2.5 mg, 2.5 mg, oral, Daily, Myriam Mattson MD    [Transfer Hold] metoprolol succinate XL (Toprol-XL) 24 hr tablet 50 mg, 50 mg, oral, Daily, Myriam Mattson MD    [Transfer Hold] nystatin (Mycostatin) 100,000 unit/gram powder 1 Application, 1 Application, Topical, BID, Myriam Mattson MD    [Transfer Hold] ondansetron (Zofran) tablet 4 mg, 4 mg, oral, q8h PRN **OR** [Transfer Hold] ondansetron (Zofran) injection 4 mg, 4 mg, intravenous, q8h PRN, Myriam Mattson MD    [Transfer Hold] rosuvastatin (Crestor) tablet 5 mg, 5 mg, oral, Daily, Myriam Mattson MD  Prior to Admission medications    Medication Sig Start Date End Date Taking? Authorizing Provider   cholecalciferol (Vitamin D-3) 5,000 Units  tablet Take 1 tablet (5,000 Units) by mouth once daily. 11/8/16  Yes Historical Provider, MD   levothyroxine (Synthroid) 112 mcg tablet TAKE 1 TABLET BY MOUTH EVERY DAY IN THE MORNING ON EMPTY STOMACH FOR 90 DAYS 3/13/24 3/8/25 Yes Alex Hummel MD   lisinopril 2.5 mg tablet TAKE 1 TABLET BY MOUTH EVERY DAY 3/13/24  Yes Alex Hummel MD   m-vit/folic acid/D3/K2/[id number] (STAGES MEN'S MULTI-VITAMIN ORAL) once every 24 hours.   Yes Historical Provider, MD   metoprolol succinate XL (Toprol-XL) 50 mg 24 hr tablet TAKE 1 TABLET BY MOUTH EVERY DAY 1/22/24  Yes Alex Hummel MD   Nyamyc 100,000 unit/gram powder Apply 1 Application topically 2 times a day. 12/22/23  Yes Alex Hummel MD   rosuvastatin (Crestor) 5 mg tablet Take 1 tablet (5 mg) by mouth once daily. 12/19/18  Yes Historical Provider, MD   ubidecarenone (COENZYME Q10, BULK, MISC) Take 200 mg by mouth.   Yes Historical Provider, MD   warfarin (Coumadin) 2.5 mg tablet Take 1 tablet (2.5 mg) by mouth once every 24 hours.    Historical Provider, MD   warfarin (Coumadin) 5 mg tablet TAKE 2 TABLETS BY MOUTH EVERY DAY 5/16/24   Alex Hummel MD   warfarin (Coumadin) 7.5 mg tablet Take 1 tablet (7.5 mg) by mouth. Take as directed per After Visit Summary.    Historical Provider, MD     No Known Allergies  Social History     Tobacco Use    Smoking status: Former     Current packs/day: 1.00     Average packs/day: 1 pack/day for 10.0 years (10.0 ttl pk-yrs)     Types: Cigarettes    Smokeless tobacco: Never   Substance Use Topics    Alcohol use: Yes     Alcohol/week: 2.0 standard drinks of alcohol     Types: 2 Glasses of wine per week         Chemistry    Lab Results   Component Value Date/Time     07/02/2024 0515    K 3.9 07/02/2024 0515     07/02/2024 0515    CO2 30 07/02/2024 0515    BUN 16 07/02/2024 0515    CREATININE 1.01 07/02/2024 0515    Lab Results   Component Value Date/Time    CALCIUM 9.0 07/02/2024 0515    ALKPHOS 85 07/01/2024  1924    AST 32 07/01/2024 1924    ALT 23 07/01/2024 1924    BILITOT 0.6 07/01/2024 1924          Lab Results   Component Value Date/Time    WBC 7.2 07/02/2024 0525    HGB 11.3 (L) 07/02/2024 0525    HCT 34.4 (L) 07/02/2024 0525     07/02/2024 0525     Lab Results   Component Value Date/Time    PROTIME 11.6 07/01/2024 2059    PROTIME 28.5 01/10/2024 1526    PROTIME 28.50 01/10/2024 0000    INR 1.0 07/01/2024 2059    INR 2.40 01/10/2024 0000     No results found for this or any previous visit (from the past 4464 hour(s)).  No results found for this or any previous visit from the past 1095 days.      Relevant Problems   Cardiac   (+) Atrial fibrillation (Multi)   (+) Heart murmur   (+) Hypercholesterolemia   (+) Hypertension   (+) Longstanding persistent atrial fibrillation (Multi)      Endocrine   (+) Hypothyroidism      Musculoskeletal   (+) DDD (degenerative disc disease), lumbar       Clinical information reviewed:   Tobacco  Allergies  Meds  Problems  Med Hx  Surg Hx   Fam Hx          NPO Detail:  NPO/Void Status  Carbohydrate Drink Given Prior to Surgery? : N  Date of Last Liquid: 07/02/24  Time of Last Liquid: 0130  Date of Last Solid: 07/02/24  Time of Last Solid: 0130  Last Intake Type: Clear fluids  Time of Last Void: 1330         Physical Exam    Airway  Mallampati: II     Cardiovascular - normal exam     Dental    Pulmonary    Abdominal            Anesthesia Plan    History of general anesthesia?: yes  History of complications of general anesthesia?: no    ASA 3     general     The patient is not a current smoker.  Patient was not previously instructed to abstain from smoking on day of procedure.  Patient did not smoke on day of procedure.  Education provided regarding risk of obstructive sleep apnea.  intravenous induction   Anesthetic plan and risks discussed with patient.    Plan discussed with CRNA.

## 2024-07-02 NOTE — PROGRESS NOTES
07/02/24 1413   Discharge Planning   Living Arrangements Alone   Support Systems Children;Friends/neighbors   Assistance Needed Patient is A&O X3, on room air at baseline, is independent with ADLs, drives and uses no DME at home. Patient denies further needs upon discharge.   Type of Residence Private residence   Number of Stairs to Enter Residence 0   Number of Stairs Within Residence 26  (2 to the kitchen, 12 to the 2nd floor, 12 to the basement)   Do you have animals or pets at home? Yes   Type of Animals or Pets 1 dog named Frannie   Who is requesting discharge planning? Provider   Home or Post Acute Services None   Patient expects to be discharged to: Home no needs   Does the patient need discharge transport arranged? No   Financial Resource Strain   How hard is it for you to pay for the very basics like food, housing, medical care, and heating? Not very   Housing Stability   In the last 12 months, was there a time when you were not able to pay the mortgage or rent on time? N   In the last 12 months, was there a time when you did not have a steady place to sleep or slept in a shelter (including now)? N   Transportation Needs   In the past 12 months, has lack of transportation kept you from medical appointments or from getting medications? no   In the past 12 months, has lack of transportation kept you from meetings, work, or from getting things needed for daily living? No

## 2024-07-02 NOTE — CONSULTS
Consults    Reason For Consult  Bladder tumor and gross hematuria    History Of Present Illness  Royce Briggs is a 78 y.o. male presenting with gross hematuria of several days, worsening over the last few hours with multiple clots, frequency and urgency, and straining on urination.  Patient reports that he has had intermittent mild hematuria for several months.  He is on Eliquis for atrial fibrillation.  He recently had a colonoscopy and his Eliquis is on hold still.    This morning he reports that his urine is clear.    He also reports generally frequency and nocturia, is not on any medicine for BPH.    In ED evaluation was performed including urinalysis that showed hematuria with little amount of white blood cells.    CT scan of the abdomen pelvis with contrast revealed a 2.1 x  1.7 cm intraluminal mass in the bladder concerning for tumor.    Patient is seen by cardiology on a yearly basis, last time was 1 year ago.  No echocardiogram available recently.     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) (09/04/2019), Other conditions influencing health status (01/14/2014), Other conditions influencing health status (01/14/2014), Personal history of other diseases of the circulatory system, Personal history of other diseases of the digestive system (01/14/2014), Personal history of other diseases of the musculoskeletal system and connective tissue (01/14/2014), Personal history of other infectious and parasitic diseases (01/14/2014), Personal history of other infectious and parasitic diseases (01/14/2014), Personal history of other medical treatment (01/14/2014), Personal history of other mental and behavioral disorders (01/14/2014), Primary osteoarthritis, unspecified shoulder (01/14/2014), and Unspecified abdominal hernia without obstruction or gangrene (01/14/2014).    Surgical History  He has a past surgical history that includes Hernia repair  (04/15/2014) and Cardiac catheterization (04/15/2014).     Social History  He reports that he has quit smoking. His smoking use included cigarettes. He has a 10 pack-year smoking history. He has never used smokeless tobacco. He reports current alcohol use of about 2.0 standard drinks of alcohol per week. He reports that he does not use drugs.    Family History  No family history on file.     Allergies  Patient has no known allergies.    Review of Systems   A complete review of systems was performed. All systems are noted to be negative unless indicated in the history of present illness, impression, active problem list, or past histories.    Physical Exam  Constitutional:       Appearance: Normal appearance. He is normal weight.   HENT:      Head: Normocephalic and atraumatic.   Cardiovascular:      Pulses: Normal pulses.   Pulmonary:      Effort: Pulmonary effort is normal.   Abdominal:      General: Abdomen is flat.      Palpations: Abdomen is soft.      Tenderness: There is no abdominal tenderness. There is no guarding.   Genitourinary:     Penis: Normal.       Testes: Normal.   Musculoskeletal:      Cervical back: Normal range of motion.   Neurological:      Mental Status: He is alert.          Last Recorded Vitals  Results for orders placed or performed during the hospital encounter of 07/02/24 (from the past 24 hour(s))   Basic metabolic panel   Result Value Ref Range    Glucose 98 74 - 99 mg/dL    Sodium 139 136 - 145 mmol/L    Potassium 3.9 3.5 - 5.3 mmol/L    Chloride 103 98 - 107 mmol/L    Bicarbonate 30 21 - 32 mmol/L    Anion Gap 10 10 - 20 mmol/L    Urea Nitrogen 16 6 - 23 mg/dL    Creatinine 1.01 0.50 - 1.30 mg/dL    eGFR 76 >60 mL/min/1.73m*2    Calcium 9.0 8.6 - 10.3 mg/dL   CBC   Result Value Ref Range    WBC 7.2 4.4 - 11.3 x10*3/uL    nRBC 0.0 0.0 - 0.0 /100 WBCs    RBC 3.44 (L) 4.50 - 5.90 x10*6/uL    Hemoglobin 11.3 (L) 13.5 - 17.5 g/dL    Hematocrit 34.4 (L) 41.0 - 52.0 %     80 - 100 fL     MCH 32.8 26.0 - 34.0 pg    MCHC 32.8 32.0 - 36.0 g/dL    RDW 12.8 11.5 - 14.5 %    Platelets 207 150 - 450 x10*3/uL     CT abdomen pelvis w IV contrast    Result Date: 7/1/2024  Interpreted By:  Jeanne Bennett, STUDY: CT ABDOMEN PELVIS W IV CONTRAST;  7/1/2024 8:51 pm   INDICATION: Signs/Symptoms:painless hematuria.   COMPARISON: CT abdomen pelvis 08/29/2015   ACCESSION NUMBER(S): [id number]   ORDERING CLINICIAN: KAN LUCIA   TECHNIQUE: Axial CT of the abdomen and pelvis was performed following intravenous administration of 75 ml of contrast Omnipaque 350 with coronal and sagittal reconstruction.   FINDINGS: Lower chest: No focal consolidation or pleural effusion.   Liver: Subcentimeter right hepatic low-attenuation lesion too small to characterize likely benign.   Biliary: No intrahepatic or extrahepatic bile duct dilation. Cholelithiasis without acute inflammation.   Spleen: No mass. No splenomegaly.   Pancreas: No mass or duct dilation.   Adrenals: Normal.   Kidneys: No suspicious mass, calculus or hydronephrosis.   GI tract: No bowel wall thickening or dilation. Normal appendix.   Lymph nodes: No abdominopelvic lymphadenopathy.   Mesentery/peritoneum: No free fluid, free air or fluid collection.   Vasculature: Mild abdominal aortic atherosclerotic calcifications without aneurysmal dilation.   Pelvis: No free fluid, free air or fluid collection. There is an intraluminal soft tissue mass adhere to the right anterolateral bladder dome measuring 2.1 x 1.7 cm (series 201, image 107), new compared to 08/29/2015 CT.   Bones/Soft tissues: No acute fracture or abdominal wall soft tissue abnormalities.Unchanged severe L5-S1 degenerative disc disease with minimal degenerative retrolisthesis. Bilateral total hip arthroplasties.       Intraluminal bladder soft tissue mass (2.1 x 1.7 cm), concerning for neoplasm. Correlate with cystoscopy.   MACRO: None   Signed by: Jeanne Bennett 7/1/2024 9:20 PM Dictation  workstation:   [id number]        Assessment/Plan   77-year-old male with atrial fibrillation, transferred from [place] for 2.1 cm bladder tumor causing gross hematuria with clots and possible mild UTI, worsening urinary symptoms, cleared up this morning.    I personally reviewed the medical records of the patient including the note of the referring physician including the CT images as well as report.    I discussed with the patient his options which are restricted at this point to cystoscopy and excision of the bladder tumor.  I explained how the procedure is done, the expected postoperative symptoms, the risks and benefits.    I explained to the patient that bladder masses are usually malignant, however can be either low grade or high grade, and can be non-muscle invasive or muscle-invasive. The definitive treatment will depend on the grade and stage, which will be revealed by a diagnostic TURBT. I explained to the patient how this is done, and the possibility of requiring a Santana catheter following the procedure, without the need for admitting to the hospital. Following the initial TURBT, if high grade tumor that is not muscle-invasive is diagnosed, then the treatment will involve BCG, however if it is muscle-invasive, then more advanced treatments such as radical cystectomy is required. The patient understands and would like to proceed with the plan.      Plan:  Obtain cardiology clearance  Take to the operating room today for TURBT    I spent 60 minutes in the professional and overall care of this patient.

## 2024-07-02 NOTE — CARE PLAN
The patient's goals for the shift include pain control    The clinical goals for the shift include rest and pain control

## 2024-07-02 NOTE — CARE PLAN
Problem: Pain - Adult  Goal: Verbalizes/displays adequate comfort level or baseline comfort level  Outcome: Progressing     Problem: Safety - Adult  Goal: Free from fall injury  Outcome: Progressing     Problem: Discharge Planning  Goal: Discharge to home or other facility with appropriate resources  Outcome: Progressing     Problem: Chronic Conditions and Co-morbidities  Goal: Patient's chronic conditions and co-morbidity symptoms are monitored and maintained or improved  Outcome: Progressing   The patient's goals for the shift include to get a nap    The clinical goals for the shift include sleep at least 2 hours this shift.    Patient was able to remain fall free this shift. Patient was able to ambulate in the room. Patient tolerated NPO this shift. Patient was able to nap for at least 2 hours.

## 2024-07-02 NOTE — H&P
History Of Present Illness  Royce Briggs is a 78 y.o. male with history of paroxysmal atrial fibrillation on long-term anticoagulation with warfarin, hypertension, hyperlipidemia, and COPD presenting with rc hematuria.  He reports undergoing colonoscopy with removal of a large polyp 5 days ago at an outside endoscopy center.  He has been off his warfarin for about a week (he stopped taking it prior to the colonoscopy).  He says he was doing well at home until yesterday, at around 1 PM, when he noticed rc hematuria with clots anytime he went to the bathroom to urinate.  He denies having any pain. No fever. No dysuria or urgency although he mentions having urinary frequency. No back or flank pain. He presented to [facility] ED and CT of the abdomen and pelvis revealed an intraluminal bladder soft tissue mass (2.1 x 1.7 cm), concerning for neoplasm. He was transferred to [facility] for further care and management.      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    Avascular necrosis of hip, left    Other conditions influencing health status 01/14/2014    Ulcer    Other conditions influencing health status 01/14/2014    Normal cardiac stress test    Personal history of other diseases of the circulatory system     History of cardiac disorder    Personal history of other diseases of the digestive system 01/14/2014    History of esophageal ulcer    Personal history of other diseases of the musculoskeletal system and connective tissue 01/14/2014    History of backache    Personal history of other infectious and parasitic diseases 01/14/2014    History of rubella    Personal history of other infectious and parasitic diseases 01/14/2014    History of mumps    Personal history of other medical treatment 01/14/2014    History of EKG    Personal history of other mental and behavioral disorders 01/14/2014     History of depression    Primary osteoarthritis, unspecified shoulder 01/14/2014    DJD of shoulder    Unspecified abdominal hernia without obstruction or gangrene 01/14/2014    Hernia       Past Surgical History  Past Surgical History:   Procedure Laterality Date    CARDIAC CATHETERIZATION  04/15/2014    Cardiac Cath Procedure Outcome:    HERNIA REPAIR  04/15/2014    Inguinal Hernia Repair        Social History  He reports that he has quit smoking. His smoking use included cigarettes. He has a 10 pack-year smoking history. He has never used smokeless tobacco. He reports current alcohol use of about 2.0 standard drinks of alcohol per week. He reports that he does not use drugs.    Family History  Mother    · Family history of stroke (V17.1) (Z82.3)  Father    · Family history of stroke (V17.1) (Z82.3)  Family History    · Family history of Heart Disease (V17.49)     Allergies  Patient has no known allergies.    Review of Systems   Constitutional: Negative.    HENT: Negative.     Eyes: Negative.    Respiratory: Negative.     Cardiovascular: Negative.    Gastrointestinal: Negative.    Endocrine: Negative.    Genitourinary:         See HPI   Musculoskeletal: Negative.    Skin: Negative.    Allergic/Immunologic: Negative.    Neurological: Negative.    Hematological: Negative.    Psychiatric/Behavioral: Negative.          Physical Exam  Constitutional:       General: He is not in acute distress.     Appearance: Normal appearance. He is not ill-appearing, toxic-appearing or diaphoretic.   HENT:      Head: Normocephalic and atraumatic.      Nose: Nose normal.      Mouth/Throat:      Mouth: Mucous membranes are dry.      Pharynx: Oropharynx is clear. No oropharyngeal exudate or posterior oropharyngeal erythema.   Eyes:      General: No scleral icterus.        Right eye: No discharge.         Left eye: No discharge.      Extraocular Movements: Extraocular movements intact.      Conjunctiva/sclera: Conjunctivae normal.  "  Cardiovascular:      Rate and Rhythm: Normal rate and regular rhythm.      Heart sounds: Murmur heard.      Comments: Loud systolic murmur heard  Pulmonary:      Breath sounds: No wheezing, rhonchi or rales.   Abdominal:      General: Bowel sounds are normal. There is no distension.      Palpations: Abdomen is soft. There is no mass.      Tenderness: There is no abdominal tenderness. There is no right CVA tenderness, left CVA tenderness, guarding or rebound.   Musculoskeletal:      Cervical back: Neck supple.      Right lower leg: Edema present.      Left lower leg: Edema present.      Comments: Trace BLE edema   Lymphadenopathy:      Cervical: No cervical adenopathy.   Skin:     General: Skin is warm and dry.      Capillary Refill: Capillary refill takes less than 2 seconds.      Findings: No lesion or rash.   Neurological:      General: No focal deficit present.      Mental Status: He is alert and oriented to person, place, and time.   Psychiatric:         Mood and Affect: Mood normal.         Behavior: Behavior normal.          Last Recorded Vitals  Blood pressure 138/75, pulse 60, temperature 36.3 °C (97.3 °F), temperature source Temporal, resp. rate 14, height 1.753 m (5' 9\"), weight 77.1 kg (170 lb), SpO2 97%.    Relevant Results   Latest Reference Range & Units 07/01/24 19:24 07/01/24 20:12 07/01/24 20:59   GLUCOSE 74 - 99 mg/dL 99     SODIUM 136 - 145 mmol/L 138     POTASSIUM 3.5 - 5.3 mmol/L 4.0     CHLORIDE 98 - 107 mmol/L 101     Bicarbonate 21 - 32 mmol/L 29     Anion Gap 10 - 20 mmol/L 12     Blood Urea Nitrogen 6 - 23 mg/dL 16     Creatinine 0.50 - 1.30 mg/dL 0.98     EGFR >60 mL/min/1.73m*2 79     Calcium 8.6 - 10.3 mg/dL 10.0     Albumin 3.4 - 5.0 g/dL 4.6     Alkaline Phosphatase 33 - 136 U/L 85     ALT 10 - 52 U/L 23     AST 9 - 39 U/L 32     Bilirubin Total 0.0 - 1.2 mg/dL 0.6     Total Protein 6.4 - 8.2 g/dL 7.9     INR 0.9 - 1.1    1.0   Protime 9.8 - 12.8 seconds   11.6   LEUKOCYTES (10*3/UL) " IN BLOOD BY AUTOMATED COUNT, Ukrainian 4.4 - 11.3 x10*3/uL 7.8     nRBC 0.0 - 0.0 /100 WBCs 0.0     ERYTHROCYTES (10*6/UL) IN BLOOD BY AUTOMATED COUNT, Ukrainian 4.50 - 5.90 x10*6/uL 3.74 (L)     HEMOGLOBIN 13.5 - 17.5 g/dL 12.5 (L)     HEMATOCRIT 41.0 - 52.0 % 37.1 (L)     MCV 80 - 100 fL 99     MCH 26.0 - 34.0 pg 33.4     MCHC 32.0 - 36.0 g/dL 33.7     RED CELL DISTRIBUTION WIDTH 11.5 - 14.5 % 12.8     PLATELETS (10*3/UL) IN BLOOD AUTOMATED COUNT, [place] 150 - 450 x10*3/uL 259     NEUTROPHILS/100 LEUKOCYTES IN BLOOD BY AUTOMATED COUNT, [place] 40.0 - 80.0 % 59.7     Immature Granulocytes %, Automated 0.0 - 0.9 % 0.3     Lymphocytes % 13.0 - 44.0 % 25.5     Monocytes % 2.0 - 10.0 % 8.1     Eosinophils % 0.0 - 6.0 % 5.8     Basophils % 0.0 - 2.0 % 0.6     NEUTROPHILS (10*3/UL) IN BLOOD BY AUTOMATED COUNT, [place] 1.60 - 5.50 x10*3/uL 4.62     Immature Granulocytes Absolute, Automated 0.00 - 0.50 x10*3/uL 0.02     Lymphocytes Absolute 0.80 - 3.00 x10*3/uL 1.98     Monocytes Absolute 0.05 - 0.80 x10*3/uL 0.63     Eosinophils Absolute 0.00 - 0.40 x10*3/uL 0.45 (H)     Basophils Absolute 0.00 - 0.10 x10*3/uL 0.05     URINE CULTURE   Rpt (IP)    Color, Urine Light-Yellow, Yellow, Dark-Yellow   Light-Red !    Appearance, Urine Clear   Turbid !    Specific Gravity, Urine 1.005 - 1.035   1.005    pH, Urine 5.0, 5.5, 6.0, 6.5, 7.0, 7.5, 8.0   6.5    Protein, Urine NEGATIVE, 10 (TRACE), 20 (TRACE) mg/dL  100 (2+) !    Glucose, Urine Normal mg/dL  Normal    Blood, Urine NEGATIVE   OVER (3+) !    Ketones, Urine NEGATIVE mg/dL  NEGATIVE    Bilirubin, Urine NEGATIVE   NEGATIVE    Urobilinogen, Urine Normal mg/dL  Normal    Nitrite, Urine NEGATIVE   NEGATIVE    Leukocyte Esterase, Urine NEGATIVE   75 Stone/µL !    WBC Clumps, Urine Reference range not established. /HPF  RARE    Bacteria, Urine NONE SEEN /HPF  1+ !    RBC, Urine NONE, 1-2, 3-5 /HPF  >20 !    WBC, Urine 1-5, NONE /HPF  21-50 !    (L): Data is abnormally low  (H):  Data is abnormally high  !: Data is abnormal  (IP): In Process  Rpt: View report in Results Review for more information    CT ABD/PELVIS:      IMPRESSION:  Intraluminal bladder soft tissue mass (2.1 x 1.7 cm), concerning for  neoplasm. Correlate with cystoscopy.     Assessment/Plan   Rc hematuria  Most likely related to bladder mass  Admit to medical floor  Continue to hold warfarin  Monitor H/H  Urology consulted -- for cystoscopy in am    Bladder mass  As in #1    Question of UTI  UA revealed 21-50 wbc/hpf, +ve LE, and 1+ back  Urine culture  Empiric antibiotic treatment with Ceftriaxone    Hypertension  Resume Lisinopril and metoprolol and monitor     PAF  Currently in NSR  Anticoagulation on hold in the setting of rc hematuria    Heart murmur  Suspect he has underlying aortic stenosis  He follows with Dr. Peralta and says VHD is not severe      I spent 65 minutes in the professional and overall care of this patient.      Myriam Mattson MD

## 2024-07-02 NOTE — ANESTHESIA POSTPROCEDURE EVALUATION
Patient: Royce Briggs    Procedure Summary       Date: 07/02/24 Room / Location: [facility] OR 01 / [facility]    Anesthesia Start: 1531 Anesthesia Stop: 1624    Procedure: TRANSURETHRAL RESECTION OF BLADDER TUMOR Diagnosis: (bladder tumor)    Surgeons: Nayeli Bowling MD Responsible Provider: Sean Dean MD    Anesthesia Type: general ASA Status: 3            Anesthesia Type: general    Vitals Value Taken Time   /73 07/02/24 1650   Temp 36 °C (96.8 °F) 07/02/24 1620   Pulse 51 07/02/24 1650   Resp 13 07/02/24 1650   SpO2 97 % 07/02/24 1650       Anesthesia Post Evaluation    Patient location during evaluation: PACU  Patient participation: complete - patient participated  Level of consciousness: awake  Pain management: adequate  Multimodal analgesia pain management approach  Airway patency: patent  Two or more strategies used to mitigate risk of obstructive sleep apnea  Cardiovascular status: acceptable  Respiratory status: acceptable  Hydration status: acceptable  Postoperative Nausea and Vomiting: none        No notable events documented.

## 2024-07-02 NOTE — ANESTHESIA PROCEDURE NOTES
Airway  Date/Time: 7/2/2024 3:39 PM  Urgency: elective    Airway not difficult    Staffing  Performed: CRNA   Authorized by: Sean Dean MD    Performed by: KANG Bates-CORKY  Patient location during procedure: OR    Indications and Patient Condition  Indications for airway management: anesthesia  Spontaneous Ventilation: absent  Sedation level: deep  Preoxygenated: yes  Mask difficulty assessment: 1 - vent by mask    Final Airway Details  Final airway type: endotracheal airway      Successful airway: ETT     Successful intubation technique: video laryngoscopy  Facilitating devices/methods: intubating stylet  Blade: Christopher  Blade size: #4  ETT size (mm): 7.5  Cormack-Lehane Classification: grade I - full view of glottis  Placement verified by: chest auscultation and capnometry   Measured from: lips  ETT to lips (cm): 22  Number of attempts at approach: 1

## 2024-07-02 NOTE — CONSULTS
Inpatient consult to Cardiology  Consult performed by: GEMMA Linda  Consult ordered by: GEMMA Patel  Reason for consult: pre op clearance          History Of Present Illness  Royce Briggs is a 78 y.o. male presenting with complaints of hematuria. He states he had a colonoscopy last week and had a large polyp removed. He had stopped his coumadin 5 days prior to the scope and then was told to continue holding the Coumadin for 5 days post polyp removal. Yesterday he noticed a few clots when he urinated and then had an increase in clots and blood later on. He denies any chest pain, shortness of breath, dizziness, or palpitations.     Lab work in the ER showed glucose 99, sodium 138, potassium 4.0, BUN/Cr 16/0.98, WBC 7.8, H&H 12.5/37.1, UA showed possible UTI, CT abd/pelvis showed an intraluminal bladder soft tissue mass concerning for neoplasm.       Past Medical History  Past Medical History:   Diagnosis Date    Atrial fibrillation     Longstanding/Persistant    Bladder tumor 07/01/2024    Right anterolateral bladder dome  2.1 x 1.7 cm    Cardiac murmur     Colon polyp 06/27/2024    COPD (chronic obstructive pulmonary disease) (Multi)     DDD (degenerative disc disease), lumbar     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    Hematospermia     Hematuria 07/01/2024    History of backache 01/14/2014    History of depression 01/14/2014    History of esophageal ulcer 01/14/2014    History of mumps 01/14/2014    History of rectal bleeding     History of rubella 01/14/2014    HLD (hyperlipidemia)     HTN (hypertension)     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    Avascular necrosis of hip, left    Normal cardiac stress test 01/14/2014    On Coumadin for atrial fibrillation     Personal history of other diseases of the circulatory system     History of cardiac disorder    Rhinitis, chronic     Tick bite 04/09/2022    Ulcer 01/14/2014    Undescended testicle     Unspecified abdominal  hernia without obstruction or gangrene 01/14/2014    Hernia   Paroxysmal atrial fibrillation, aortic stenosis    Surgical History  Past Surgical History:   Procedure Laterality Date    CARDIAC CATHETERIZATION  04/15/2014    Cardiac Cath Procedure Outcome:    COLONOSCOPY W/ POLYPECTOMY  06/27/2024    HERNIA REPAIR  04/15/2014    Inguinal Hernia Repair        Social History  He reports that he has quit smoking. His smoking use included cigarettes. He has a 10 pack-year smoking history. He has never used smokeless tobacco. He reports current alcohol use of about 2.0 standard drinks of alcohol per week. He reports that he does not use drugs.    Family History  No family history on file.     Allergies  Patient has no known allergies.    Review of Systems  Review of Systems   Constitutional:  Negative for chills and fever.   Respiratory:  Negative for cough and shortness of breath.    Cardiovascular:  Negative for chest pain, palpitations and leg swelling.   Gastrointestinal:  Negative for abdominal distention and abdominal pain.   Genitourinary:  Positive for hematuria.   Neurological:  Negative for dizziness and weakness.   All other systems reviewed and are negative.         Physical Exam  Constitutional: Well developed, awake/alert/oriented x3, no distress, alert and cooperative  Eyes: PERRL, EOMI, clear sclera  ENMT: mucous membranes moist, no apparent injury, no lesions seen  Head/Neck: Neck supple, no apparent injury, thyroid without mass or tenderness, No JVD, trachea midline, no bruits  Respiratory/Thorax: Patent airways, CTAB, normal breath sounds with good chest expansion, thorax symmetric  Cardiovascular: Regular, rate and rhythm, 2/6 systolic murmur, 2+ equal pulses of the extremities, normal S 1and S 2  Gastrointestinal: Nondistended, soft, non-tender, no rebound tenderness or guarding, no masses palpable, no organomegaly, +BS, no bruits  Musculoskeletal: ROM intact, no joint swelling, normal  "strength  Extremities: normal extremities, no cyanosis edema, contusions or wounds, no clubbing  Neurological: alert and oriented x3, intact senses, motor, response and reflexes, normal strength  Lymphatic: No significant lymphadenopathy  Psychological: Appropriate mood and behavior  Skin: Warm and dry, no lesions, no rashes       Last Recorded Vitals  Blood pressure 139/74, pulse 55, temperature 36.4 °C (97.5 °F), resp. rate 16, height 1.753 m (5' 9\"), weight 77.1 kg (170 lb), SpO2 96%.    Relevant Results    Scheduled medications  cefTRIAXone, 1 g, intravenous, q12h  cholecalciferol, 5,000 Units, oral, Daily  docusate sodium, 100 mg, oral, BID  levothyroxine, 112 mcg, oral, Daily before breakfast  lisinopril, 2.5 mg, oral, Daily  metoprolol succinate XL, 50 mg, oral, Daily  nystatin, 1 Application, Topical, BID  rosuvastatin, 5 mg, oral, Daily      Continuous medications  D5 % and 0.9 % sodium chloride, 50 mL/hr, Last Rate: 50 mL/hr (07/02/24 0559)      PRN medications  PRN medications: acetaminophen **OR** acetaminophen **OR** acetaminophen, alum-mag hydroxide-simeth, calcium carbonate, dextromethorphan-guaifenesin, guaiFENesin, ondansetron **OR** ondansetron    Results for orders placed or performed during the hospital encounter of 07/02/24 (from the past 24 hour(s))   Basic metabolic panel   Result Value Ref Range    Glucose 98 74 - 99 mg/dL    Sodium 139 136 - 145 mmol/L    Potassium 3.9 3.5 - 5.3 mmol/L    Chloride 103 98 - 107 mmol/L    Bicarbonate 30 21 - 32 mmol/L    Anion Gap 10 10 - 20 mmol/L    Urea Nitrogen 16 6 - 23 mg/dL    Creatinine 1.01 0.50 - 1.30 mg/dL    eGFR 76 >60 mL/min/1.73m*2    Calcium 9.0 8.6 - 10.3 mg/dL   CBC   Result Value Ref Range    WBC 7.2 4.4 - 11.3 x10*3/uL    nRBC 0.0 0.0 - 0.0 /100 WBCs    RBC 3.44 (L) 4.50 - 5.90 x10*6/uL    Hemoglobin 11.3 (L) 13.5 - 17.5 g/dL    Hematocrit 34.4 (L) 41.0 - 52.0 %     80 - 100 fL    MCH 32.8 26.0 - 34.0 pg    MCHC 32.8 32.0 - 36.0 g/dL "    RDW 12.8 11.5 - 14.5 %    Platelets 207 150 - 450 x10*3/uL       No orders to display          Assessment/Plan   LHC in 2021 showed normal coronary arteries    Echocardiogram from may 2023 showed LVEF 65%, moderate AS, mild MR/TR, abnormal LV relaxation    Hematuria  -CT abd/pelvis reviewed, concern for neoplasm  -Urology consulted, planning for cystoscopy  -Hold AC    -EKG ordered and reviewed, SB, no acute ischemic changes  -denies ACS symptoms  -Echo as above  -LHC as above  -He is cleared for cystoscopy at a low cardiac risk    2. Paroxysmal atrial fibrillation  -Currently in SR, short run of AF noted this morning  -AC on hold for recent colonoscopy and now hematuria  -Cont metoprolol for rate control  -Monitor on tele    3. Aortic stenosis  -Moderate per echo  -Outpt echo scheduled for later this month    4. Hypertension  -stable  -2gm na diet  -cont meds  -monitor    KANG Linda-CNP

## 2024-07-02 NOTE — ED PROVIDER NOTES
"HPI   Chief Complaint   Patient presents with    Blood in Urine     Blood in urine started today       History of present illness:  This 78-year-old male presented to the ED today with complaints of hematuria.  Reported that this afternoon he began to pass \"a lot of blood and clots\" via his urine.  Patient has a significant past medical history including atrial fibrillation to which he takes warfarin for.  However he indicated that on 06/27/2024, he underwent a colonoscopy with subsequent removal of \"a very large polyp\" via Dr. Gudino at [facility].  He was advised to discontinue warfarin for this procedure: He stopped warfarin on 06/22/2024 and has not taken warfarin since that time.  Presently, the patient denied any dysuria, discharge, increased frequency, fevers, chills, flank pain, chest pain, abdominal pain, palpitations, shortness of breath, or wheezing.  The patient affirms he has been able to have a bowel movement since the procedure without pain or complication.  Patient also endorses a prior smoking history smoked 1 ppd for multiple years though was not able to calculate how long he had smoked for, however did quit roughly 8 years ago.  Due to the acute onset of gross hematuria with clots as well as his past medical history of A-fib, this necessitated his visit to the ED today.    Social history: Negative for alcohol and drug use.    Review of systems:   Gen.: No weight loss, fatigue, anorexia, insomnia, fever.   Eyes: No vision loss, double vision, drainage, eye pain.   ENT: No pharyngitis, neck pain  Cardiac: No chest pain, palpitations, syncope, near syncope.   Pulmonary: No shortness of breath, cough, hemoptysis.   Heme/lymph: No swollen glands, fever, bleeding.   GI: No abdominal pain, change in bowel habits, melena, hematemesis, hematochezia, nausea, vomiting, diarrhea.   : No discharge, dysuria, frequency, urgency  Musculoskeletal: No limb pain, joint pain, joint " "swelling.   Skin: No rashes.   Review of systems is otherwise negative unless stated above or in history of present illness.        Physical exam:  General: Vitals noted, no distress. Afebrile.   EENT: No lymphadenopathy appreciated  Cardiac: Regular, rate, rhythm, no murmur.   Pulmonary: Lungs clear bilaterally with good aeration. No adventitious breath sounds.   Abdomen: Soft, nonsurgical. Nontender. No peritoneal signs. Normoactive bowel sounds.   Extremities: No peripheral edema.  Skin: No rash.   Neuro: No focal neurologic deficits      Medical decision making:   Testing: CBC CMP urinalysis CT scan abdomen pelvis with contrast: Urinalysis shows concerns for UTI +3 bacteria as well as multiple red blood cells and white blood cells, CT scan of the abdomen pelvis shows concerns for bladder neoplasm as interpreted by radiology  Treatment: IV Rocephin given IV fluids given  Reevaluation:   Plan: This 78-year-old male presented to the ED today with complaints of hematuria.  Reported that this afternoon he began to pass \"a lot of blood and clots\" via his urine.  Patient has a significant past medical history including atrial fibrillation to which he takes warfarin for.  However he indicated that on 06/27/2024, he underwent a colonoscopy with subsequent removal of \"a very large polyp\" via Dr. Gudino at [facility].  He was advised to discontinue warfarin for this procedure: He stopped warfarin on 06/22/2024 and has not taken warfarin since that time.  Presently, the patient denied any dysuria, discharge, increased frequency, fevers, chills, flank pain, chest pain, abdominal pain, palpitations, shortness of breath, or wheezing.  The patient affirms he has been able to have a bowel movement since the procedure without pain or complication.  Patient also endorses a prior smoking history smoked 1 ppd for multiple years though was not able to calculate how long he had smoked for, however did " quit roughly 8 years ago.  Due to the acute onset of gross hematuria with clots as well as his past medical history of A-fib, this necessitated his visit to the ED today. Abdomen: Soft, nonsurgical. Nontender. No peritoneal signs. Normoactive bowel sounds.  Lungs are clear to auscultation throughout normal S1-S2 heart sounds appreciated.  Patient has no pain to palpation across the abdomen.  I explained to the pt the test results and I spoke with Dr. Bowling who explained to me that he would be happy to see the patient over the [facility] and request the patient be transferred this evening so that they can perform cystoscopy and biopsy tonight.  The patient was accepted by the hospitalist team at this time and was transferred at this time.  Impression:   1.  Bladder mass  2.  Hematuria  3.  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    Avascular necrosis of hip, left    Other conditions influencing health status 01/14/2014    Ulcer    Other conditions influencing health status 01/14/2014    Normal cardiac stress test    Personal history of other diseases of the circulatory system     History of cardiac disorder    Personal history of other diseases of the digestive system 01/14/2014    History of esophageal ulcer    Personal history of other diseases of the musculoskeletal system and connective tissue 01/14/2014    History of backache    Personal history of other infectious and parasitic diseases 01/14/2014    History of rubella    Personal history of other infectious and parasitic diseases 01/14/2014    History of mumps    Personal history of other medical treatment 01/14/2014    History of EKG    Personal history of other mental and behavioral disorders 01/14/2014    History of depression    Primary  osteoarthritis, unspecified shoulder 01/14/2014    DJD of shoulder    Unspecified abdominal hernia without obstruction or gangrene 01/14/2014    Hernia     Past Surgical History:   Procedure Laterality Date    CARDIAC CATHETERIZATION  04/15/2014    Cardiac Cath Procedure Outcome:    HERNIA REPAIR  04/15/2014    Inguinal Hernia Repair     No family history on file.  Social History     Tobacco Use    Smoking status: Former     Current packs/day: 1.00     Average packs/day: 1 pack/day for 10.0 years (10.0 ttl pk-yrs)     Types: Cigarettes    Smokeless tobacco: Never   Vaping Use    Vaping status: Never Used   Substance Use Topics    Alcohol use: Yes     Alcohol/week: 2.0 standard drinks of alcohol     Types: 2 Glasses of wine per week    Drug use: Never       Physical Exam   ED Triage Vitals [07/01/24 1718]   Temperature Heart Rate Respirations BP   36.5 °C (97.7 °F) 64 14 170/83      Pulse Ox Temp Source Heart Rate Source Patient Position   97 % Tympanic -- Sitting      BP Location FiO2 (%)     Left arm --       Physical Exam    ED Course & MDM   Diagnoses as of 07/03/24 1957   Bladder mass   Acute cystitis with hematuria       Medical Decision Making      Procedure  Procedures     Alex Kim PA-C  07/03/24 1958

## 2024-07-02 NOTE — OP NOTE
TRANSURETHRAL RESECTION OF BLADDER TUMOR Operative Note     Date: 2024  OR Location: [facility] OR    Name: Royce Briggs, : 1945, Age: 78 y.o., MRN: [id number], Sex: male    Diagnosis  Pre-op Diagnosis     * Bladder tumor [D49.4] * No Diagnosis Codes entered *     Procedures  TRANSURETHRAL RESECTION OF BLADDER TUMOR  10600 - NH CYSTOURETHROSCOPY W/DEST &/RMVL MED BLADDER STEFANI      Surgeons      * Nayeli Bowling - Primary    Resident/Fellow/Other Assistant:  Surgeons and Role:  * No surgeons found with a matching role *    Procedure Summary  Anesthesia: Anesthesia type not filed in the log.  ASA: ASA status not filed in the log.  Anesthesia Staff: Anesthesiologist: Sean Dean MD  CRNA: KANG Bates-CRNA  Estimated Blood Loss: 10 mL  Intra-op Medications:   Administrations occurring from 1530 to 1700 on 24:   Medication Name Total Dose   sodium chloride 0.9 % irrigation solution 8,000 mL              Anesthesia Record               Intraprocedure I/O Totals       None           Specimen:   ID Type Source Tests Collected by Time   1 : BLADDER TUMOR Tissue BLADDER BIOPSY SURGICAL PATHOLOGY EXAM Nayeli Bowling MD 2024 1608        Staff:   Circulator: Jeannette Alvarado Person: Ingrid  Circulator: Lashon         Drains and/or Catheters:   Urethral Catheter 18 Fr. (Active)   Site Assessment Clean;Skin intact 24 1620   Collection Container Standard drainage bag 24 1620   Securement Method Leg strap 24 1620       Tourniquet Times:         Implants:     Findings: 3cm bladder tumor in right anterolateral wall    Indications: Royce Briggs is an 78 y.o. male who is having surgery for bladder tumor.     The patient was seen in the preoperative area. The risks, benefits, complications, treatment options, non-operative alternatives, expected recovery and outcomes were discussed with the patient. The possibilities of reaction to medication, pulmonary aspiration, injury to  surrounding structures, bleeding, recurrent infection, the need for additional procedures, failure to diagnose a condition, and creating a complication requiring transfusion or operation were discussed with the patient. The patient concurred with the proposed plan, giving informed consent.  The site of surgery was properly noted/marked if necessary per policy. The patient has been actively warmed in preoperative area. Preoperative antibiotics have been ordered and given within 1 hours of incision. Venous thrombosis prophylaxis have been ordered including bilateral sequential compression devices    Procedure Details: Patient was consented and antibiotics were started on-call to the OR.  In the operating room, under general anesthesia, patient placed in dorsolithotomy position, genitalia was prepped and draped in usual manner.  #17 cystoscope was inserted down the urethra into the bladder, and cystoscopy revealed the 3cm bladder tumor in right anterolateral wall.  #26 resectoscope was then inserted and using the loop on the working element, the area of tumor was resected down to the muscle and fat layer.  This was done the whole surface of the tumor.  Extensive hemostasis was performed for the underlying base of the tumor as well as the surrounding mucosa.  All resected chips were removed and sent for pathological examination.   No 18 French Santana catheter was inserted.  Patient was awakened and transferred to PACU in stable condition.    Complications:  None; patient tolerated the procedure well.    Disposition: PACU - hemodynamically stable.  Condition: stable         Additional Details:     Attending Attestation: I performed the procedure.    Nayeli Bowling  Phone Number: [phone]

## 2024-07-03 ENCOUNTER — PHARMACY VISIT (OUTPATIENT)
Dept: PHARMACY | Facility: CLINIC | Age: 79
End: 2024-07-03
Payer: COMMERCIAL

## 2024-07-03 PROBLEM — D49.4 BLADDER TUMOR: Status: ACTIVE | Noted: 2024-07-03

## 2024-07-03 LAB
BACTERIA UR CULT: NO GROWTH
ERYTHROCYTE [DISTWIDTH] IN BLOOD BY AUTOMATED COUNT: 12.8 % (ref 11.5–14.5)
HCT VFR BLD AUTO: 37 % (ref 41–52)
HGB BLD-MCNC: 12 G/DL (ref 13.5–17.5)
MCH RBC QN AUTO: 32.8 PG (ref 26–34)
MCHC RBC AUTO-ENTMCNC: 32.4 G/DL (ref 32–36)
MCV RBC AUTO: 101 FL (ref 80–100)
NRBC BLD-RTO: 0 /100 WBCS (ref 0–0)
PLATELET # BLD AUTO: 212 X10*3/UL (ref 150–450)
RBC # BLD AUTO: 3.66 X10*6/UL (ref 4.5–5.9)
WBC # BLD AUTO: 9.5 X10*3/UL (ref 4.4–11.3)

## 2024-07-03 PROCEDURE — 2500000001 HC RX 250 WO HCPCS SELF ADMINISTERED DRUGS (ALT 637 FOR MEDICARE OP): Performed by: INTERNAL MEDICINE

## 2024-07-03 PROCEDURE — 99232 SBSQ HOSP IP/OBS MODERATE 35: CPT | Performed by: NURSE PRACTITIONER

## 2024-07-03 PROCEDURE — RXMED WILLOW AMBULATORY MEDICATION CHARGE

## 2024-07-03 PROCEDURE — 2500000001 HC RX 250 WO HCPCS SELF ADMINISTERED DRUGS (ALT 637 FOR MEDICARE OP): Performed by: NURSE PRACTITIONER

## 2024-07-03 PROCEDURE — 1200000002 HC GENERAL ROOM WITH TELEMETRY DAILY

## 2024-07-03 PROCEDURE — 85027 COMPLETE CBC AUTOMATED: CPT | Performed by: INTERNAL MEDICINE

## 2024-07-03 PROCEDURE — G0378 HOSPITAL OBSERVATION PER HR: HCPCS

## 2024-07-03 PROCEDURE — 36415 COLL VENOUS BLD VENIPUNCTURE: CPT | Performed by: INTERNAL MEDICINE

## 2024-07-03 PROCEDURE — 2500000004 HC RX 250 GENERAL PHARMACY W/ HCPCS (ALT 636 FOR OP/ED): Performed by: INTERNAL MEDICINE

## 2024-07-03 PROCEDURE — 99232 SBSQ HOSP IP/OBS MODERATE 35: CPT

## 2024-07-03 PROCEDURE — 2500000002 HC RX 250 W HCPCS SELF ADMINISTERED DRUGS (ALT 637 FOR MEDICARE OP, ALT 636 FOR OP/ED): Performed by: INTERNAL MEDICINE

## 2024-07-03 PROCEDURE — 2500000002 HC RX 250 W HCPCS SELF ADMINISTERED DRUGS (ALT 637 FOR MEDICARE OP, ALT 636 FOR OP/ED): Performed by: NURSE PRACTITIONER

## 2024-07-03 RX ORDER — NAPROXEN SODIUM 220 MG/1
81 TABLET, FILM COATED ORAL DAILY
Status: DISCONTINUED | OUTPATIENT
Start: 2024-07-03 | End: 2024-07-04 | Stop reason: HOSPADM

## 2024-07-03 RX ORDER — NAPROXEN SODIUM 220 MG/1
81 TABLET, FILM COATED ORAL DAILY
Qty: 30 TABLET | Refills: 0 | Status: SHIPPED | OUTPATIENT
Start: 2024-07-04 | End: 2024-07-19 | Stop reason: ALTCHOICE

## 2024-07-03 RX ORDER — TAMSULOSIN HYDROCHLORIDE 0.4 MG/1
0.4 CAPSULE ORAL DAILY
Qty: 30 CAPSULE | Refills: 0 | Status: SHIPPED | OUTPATIENT
Start: 2024-07-04 | End: 2024-07-19 | Stop reason: SDUPTHER

## 2024-07-03 RX ORDER — TAMSULOSIN HYDROCHLORIDE 0.4 MG/1
0.4 CAPSULE ORAL DAILY
Status: DISCONTINUED | OUTPATIENT
Start: 2024-07-03 | End: 2024-07-04 | Stop reason: HOSPADM

## 2024-07-03 ASSESSMENT — COGNITIVE AND FUNCTIONAL STATUS - GENERAL
MOBILITY SCORE: 24
DAILY ACTIVITIY SCORE: 24
DAILY ACTIVITIY SCORE: 24
MOBILITY SCORE: 24

## 2024-07-03 ASSESSMENT — PAIN SCALES - GENERAL: PAINLEVEL_OUTOF10: 0 - NO PAIN

## 2024-07-03 NOTE — PROGRESS NOTES
Royce Briggs is a 78 y.o. male on day 0 of admission presenting with Bladder mass.      Subjective   He is sitting up in the chair, no complaints.       Review of systems:  Constitutional: negative for fever, chills, or malaise  Neuro: negative for dizziness, headache, numbness, tingling  ENT: Negative for nasal congestion or sore throat  Resp: negative for shortness of breath, cough, or wheezing  CV: negative for chest pain, palpitations  GI: negative for abd pain, nausea, vomiting or diarrhea  : negative for dysuria, frequency, or urgency  Skin: negative for lesions, wounds, or rash  Musculoskeletal: Negative for weakness, myalgia, or arthralgia  Endocrine: Negative for polyuria or polydipsia         Objective   Constitutional: Well developed, awake/alert/oriented x3, no distress, alert and cooperative  Eyes: PERRL, EOMI, clear sclera  ENMT: mucous membranes moist, no apparent injury, no lesions seen  Head/Neck: Neck supple, no apparent injury, thyroid without mass or tenderness, No JVD, trachea midline, no bruits  Respiratory/Thorax: Patent airways, CTAB, normal breath sounds with good chest expansion, thorax symmetric  Cardiovascular: Regular, rate and rhythm, 2/6 systolic murmur, 2+ equal pulses of the extremities, normal S 1and S 2  Gastrointestinal: Nondistended, soft, non-tender, no rebound tenderness or guarding, no masses palpable, no organomegaly, +BS, no bruits  Musculoskeletal: ROM intact, no joint swelling, normal strength  Extremities: normal extremities, no cyanosis edema, contusions or wounds, no clubbing  Neurological: alert and oriented x3, intact senses, motor, response and reflexes, normal strength  Lymphatic: No significant lymphadenopathy  Psychological: Appropriate mood and behavior  Skin: Warm and dry, no lesions, no rashes      Last Recorded Vitals  /64 (BP Location: Left arm, Patient Position: Lying)   Pulse 64   Temp 36 °C (96.8 °F) (Temporal)   Resp 16   Ht 1.753 m (5'  "9\")   Wt 77.1 kg (170 lb)   SpO2 94%   BMI 25.10 kg/m²     Intake/Output last 3 Shifts:  I/O last 3 completed shifts:  In: 2345 (30.4 mL/kg) [P.O.:1080; I.V.:1165 (15.1 mL/kg); IV Piggyback:100]  Out: 1250 (16.2 mL/kg) [Urine:1250 (0.5 mL/kg/hr)]  Weight: 77.1 kg   I/O this shift:  In: -   Out: 600 [Urine:600]    Relevant Results  Scheduled medications  aspirin, 81 mg, oral, Daily  cefTRIAXone, 1 g, intravenous, q12h  cholecalciferol, 5,000 Units, oral, Daily  docusate sodium, 100 mg, oral, BID  levothyroxine, 112 mcg, oral, Daily before breakfast  lisinopril, 2.5 mg, oral, Daily  metoprolol succinate XL, 50 mg, oral, Daily  nystatin, 1 Application, Topical, BID  rosuvastatin, 5 mg, oral, Daily  tamsulosin, 0.4 mg, oral, Daily      Continuous medications  D5 % and 0.9 % sodium chloride, 50 mL/hr, Last Rate: Stopped (07/03/24 0300)      PRN medications  PRN medications: acetaminophen **OR** acetaminophen **OR** acetaminophen, alum-mag hydroxide-simeth, calcium carbonate, dextromethorphan-guaifenesin, guaiFENesin, ondansetron **OR** ondansetron    Results for orders placed or performed during the hospital encounter of 07/02/24 (from the past 24 hour(s))   CBC   Result Value Ref Range    WBC 9.5 4.4 - 11.3 x10*3/uL    nRBC 0.0 0.0 - 0.0 /100 WBCs    RBC 3.66 (L) 4.50 - 5.90 x10*6/uL    Hemoglobin 12.0 (L) 13.5 - 17.5 g/dL    Hematocrit 37.0 (L) 41.0 - 52.0 %     (H) 80 - 100 fL    MCH 32.8 26.0 - 34.0 pg    MCHC 32.4 32.0 - 36.0 g/dL    RDW 12.8 11.5 - 14.5 %    Platelets 212 150 - 450 x10*3/uL       No orders to display           Assessment/Plan   Principal Problem:    Bladder mass    LHC in 2021 showed normal coronary arteries     Echocardiogram from may 2023 showed LVEF 65%, moderate AS, mild MR/TR, abnormal LV relaxation     Hematuria  -CT abd/pelvis reviewed, concern for neoplasm  -Urology consulted  -s/p TURBT  -Santana with clear yellow urine  -Discussed with Dr. Wyman, hold AC for at least 4 weeks   "   2. Paroxysmal atrial fibrillation  -Currently in SR, short run of AF noted this morning  -Hold coumadin for at least 4 weeks  -Discussed possible watchman as outpt  -Start aspirin 81mg daily, monitor for bleeding  -Cont metoprolol for rate control  -Monitor on tele     3. Aortic stenosis  -Moderate per echo  -Outpt echo scheduled for later this month     4. Hypertension  -stable  -2gm na diet  -cont meds  -monitor      KANG Linda-CNP

## 2024-07-03 NOTE — PROGRESS NOTES
"Royce Briggs is a 78 y.o. male on day 0 of admission presenting with Bladder mass s/p  Day 1 transurethral resection of bladder tumor     Subjective    NAEO.  Patient states that he has no major pain. Is tolerating a diet without any issues.   Stated that he had some questions about the mass itself and its size and what the next steps were.   Denies any other complaints or concerns.    Objective     Physical Exam  Constitutional:       Appearance: Normal appearance. He is normal weight.   HENT:      Head: Normocephalic and atraumatic.      Nose: Nose normal.      Mouth/Throat:      Mouth: Mucous membranes are moist.      Pharynx: Oropharynx is clear.   Eyes:      Extraocular Movements: Extraocular movements intact.      Conjunctiva/sclera: Conjunctivae normal.      Pupils: Pupils are equal, round, and reactive to light.   Cardiovascular:      Rate and Rhythm: Normal rate. Rhythm irregular.      Heart sounds: Murmur heard.   Pulmonary:      Effort: Pulmonary effort is normal.      Breath sounds: Normal breath sounds.   Abdominal:      General: Abdomen is flat.      Palpations: Abdomen is soft.   Skin:     General: Skin is warm and dry.      Capillary Refill: Capillary refill takes less than 2 seconds.   Neurological:      General: No focal deficit present.      Mental Status: He is alert and oriented to person, place, and time. Mental status is at baseline.   Psychiatric:         Mood and Affect: Mood normal.         Behavior: Behavior normal.         Thought Content: Thought content normal.         Judgment: Judgment normal.         Last Recorded Vitals  Blood pressure 117/64, pulse 64, temperature 36 °C (96.8 °F), temperature source Temporal, resp. rate 16, height 1.753 m (5' 9\"), weight 77.1 kg (170 lb), SpO2 94%.  Intake/Output last 3 Shifts:  I/O last 3 completed shifts:  In: 2345 (30.4 mL/kg) [P.O.:1080; I.V.:1165 (15.1 mL/kg); IV Piggyback:100]  Out: 1250 (16.2 mL/kg) [Urine:1250 (0.5 mL/kg/hr)]  Weight: " 77.1 kg     Relevant Results       Results for orders placed or performed during the hospital encounter of 07/02/24 (from the past 24 hour(s))   CBC   Result Value Ref Range    WBC 9.5 4.4 - 11.3 x10*3/uL    nRBC 0.0 0.0 - 0.0 /100 WBCs    RBC 3.66 (L) 4.50 - 5.90 x10*6/uL    Hemoglobin 12.0 (L) 13.5 - 17.5 g/dL    Hematocrit 37.0 (L) 41.0 - 52.0 %     (H) 80 - 100 fL    MCH 32.8 26.0 - 34.0 pg    MCHC 32.4 32.0 - 36.0 g/dL    RDW 12.8 11.5 - 14.5 %    Platelets 212 150 - 450 x10*3/uL      ECG 12 lead    Result Date: 7/2/2024  Sinus bradycardia Otherwise normal ECG When compared with ECG of 27-MAY-2018 18:23, Sinus rhythm has replaced Atrial fibrillation Vent. rate has decreased BY  26 BPM Criteria for Septal infarct are no longer Present T wave inversion less evident in Inferior leads Nonspecific T wave abnormality no longer evident in Lateral leads    CT abdomen pelvis w IV contrast    Result Date: 7/1/2024  Interpreted By:  Jeanne Bennett, STUDY: CT ABDOMEN PELVIS W IV CONTRAST;  7/1/2024 8:51 pm   INDICATION: Signs/Symptoms:painless hematuria.   COMPARISON: CT abdomen pelvis 08/29/2015   ACCESSION NUMBER(S): [id number]   ORDERING CLINICIAN: KAN LUCIA   TECHNIQUE: Axial CT of the abdomen and pelvis was performed following intravenous administration of 75 ml of contrast Omnipaque 350 with coronal and sagittal reconstruction.   FINDINGS: Lower chest: No focal consolidation or pleural effusion.   Liver: Subcentimeter right hepatic low-attenuation lesion too small to characterize likely benign.   Biliary: No intrahepatic or extrahepatic bile duct dilation. Cholelithiasis without acute inflammation.   Spleen: No mass. No splenomegaly.   Pancreas: No mass or duct dilation.   Adrenals: Normal.   Kidneys: No suspicious mass, calculus or hydronephrosis.   GI tract: No bowel wall thickening or dilation. Normal appendix.   Lymph nodes: No abdominopelvic lymphadenopathy.   Mesentery/peritoneum: No free fluid,  free air or fluid collection.   Vasculature: Mild abdominal aortic atherosclerotic calcifications without aneurysmal dilation.   Pelvis: No free fluid, free air or fluid collection. There is an intraluminal soft tissue mass adhere to the right anterolateral bladder dome measuring 2.1 x 1.7 cm (series 201, image 107), new compared to 08/29/2015 CT.   Bones/Soft tissues: No acute fracture or abdominal wall soft tissue abnormalities.Unchanged severe L5-S1 degenerative disc disease with minimal degenerative retrolisthesis. Bilateral total hip arthroplasties.       Intraluminal bladder soft tissue mass (2.1 x 1.7 cm), concerning for neoplasm. Correlate with cystoscopy.   MACRO: None   Signed by: Jeanne Bennett 7/1/2024 9:20 PM Dictation workstation:   [id number]      Scheduled medications  aspirin, 81 mg, oral, Daily  cefTRIAXone, 1 g, intravenous, q12h  cholecalciferol, 5,000 Units, oral, Daily  docusate sodium, 100 mg, oral, BID  levothyroxine, 112 mcg, oral, Daily before breakfast  lisinopril, 2.5 mg, oral, Daily  metoprolol succinate XL, 50 mg, oral, Daily  nystatin, 1 Application, Topical, BID  rosuvastatin, 5 mg, oral, Daily  tamsulosin, 0.4 mg, oral, Daily      Continuous medications  D5 % and 0.9 % sodium chloride, 50 mL/hr, Last Rate: Stopped (07/03/24 0300)      PRN medications  PRN medications: acetaminophen **OR** acetaminophen **OR** acetaminophen, alum-mag hydroxide-simeth, calcium carbonate, dextromethorphan-guaifenesin, guaiFENesin, ondansetron **OR** ondansetron                Assessment/Plan   Principal Problem:    Bladder mass    Pt is a 79 yo M who is admitted for bladder mass s/p transurethral resection of bladder tumor with pertinent PMH of paroxysmal a.fib, HTN, HLD, and COPD who presented rc hematuria.     Hematuria  Bladder Mass  S/p transurethral resection of bladder tumor 07/02/2024  -Urology Consulted; recommendations appreciated  - Flomax 0.4 mg every day, maintain velasquez today then  discontinue velasquez tomorrow if can urinate then can discharge home per urology recs  - Continue to monitor Hgb  - Continue to hold Warfarin    Concern for UTI  -Ucx pending  -UA : 1+ bacteria, 21-50 WBC, + leuks, negative nitrates  - Continue empiric tx with Ceftriaxone    Paroxysmal a.fib  -Holding warfarin per cardio  - ASA 81 mg started per cardio  - Continue metoprolol  - Continue to monitor on telemetry  - Per cardio may need possible watchman as outpatient    Heart Murmur  - Echo shows moderate aortic stenosis  - will need outpatient echo per cardio               Patient was staffed with Dr. Forbes,   Mar Castañeda DO, PGY-3  [facility]

## 2024-07-03 NOTE — PROGRESS NOTES
07/03/24 1013   Discharge Planning   Living Arrangements Alone   Support Systems Children;Friends/neighbors   Assistance Needed Patient is A&O X3, on room air at baseline, is independent with ADLs, drives and uses no DME at home. Patient denies further needs upon discharge.   Type of Residence Private residence   Number of Stairs to Enter Residence 0   Number of Stairs Within Residence 26  (2 to the kitchen, 12 to the 2nd floor, 12 to the basement)   Do you have animals or pets at home? Yes   Type of Animals or Pets 1 dog named Frannie   Who is requesting discharge planning? Provider   Home or Post Acute Services None   Patient expects to be discharged to: Home no needs   Does the patient need discharge transport arranged? No

## 2024-07-03 NOTE — PROGRESS NOTES
"Royce Briggs is a 78 y.o. male on day 0 of admission presenting with Bladder mass.    Subjective   No acute overnight events.  Denies pain, no nausea/vomiting.  Tolerating PO intake.         Objective     Physical Exam  Vitals reviewed.   HENT:      Head: Normocephalic and atraumatic.   Eyes:      Extraocular Movements: Extraocular movements intact.      Conjunctiva/sclera: Conjunctivae normal.      Pupils: Pupils are equal, round, and reactive to light.   Cardiovascular:      Rate and Rhythm: Normal rate and regular rhythm.      Pulses: Normal pulses.   Pulmonary:      Effort: Pulmonary effort is normal.   Abdominal:      Palpations: Abdomen is soft.   Genitourinary:     Comments: Santana in place with clear yellow urine   Skin:     General: Skin is warm and dry.      Capillary Refill: Capillary refill takes less than 2 seconds.   Neurological:      General: No focal deficit present.      Mental Status: He is alert and oriented to person, place, and time.       Last Recorded Vitals  Blood pressure 100/60, pulse 62, temperature 36.2 °C (97.2 °F), temperature source Temporal, resp. rate 16, height 1.753 m (5' 9\"), weight 77.1 kg (170 lb), SpO2 96%.  Intake/Output last 3 Shifts:  I/O last 3 completed shifts:  In: 164.2 (2.1 mL/kg) [I.V.:114.2 (1.5 mL/kg); IV Piggyback:50]  Out: 50 (0.6 mL/kg) [Urine:50 (0 mL/kg/hr)]  Weight: 77.1 kg     Relevant Results  ECG 12 lead    Result Date: 7/2/2024  Sinus bradycardia Otherwise normal ECG When compared with ECG of 27-MAY-2018 18:23, Sinus rhythm has replaced Atrial fibrillation Vent. rate has decreased BY  26 BPM Criteria for Septal infarct are no longer Present T wave inversion less evident in Inferior leads Nonspecific T wave abnormality no longer evident in Lateral leads    CT abdomen pelvis w IV contrast    Result Date: 7/1/2024  Interpreted By:  Jeanne Bennett, STUDY: CT ABDOMEN PELVIS W IV CONTRAST;  7/1/2024 8:51 pm   INDICATION: Signs/Symptoms:painless hematuria.   " COMPARISON: CT abdomen pelvis 08/29/2015   ACCESSION NUMBER(S): [id number]   ORDERING CLINICIAN: KAN LUCIA   TECHNIQUE: Axial CT of the abdomen and pelvis was performed following intravenous administration of 75 ml of contrast Omnipaque 350 with coronal and sagittal reconstruction.   FINDINGS: Lower chest: No focal consolidation or pleural effusion.   Liver: Subcentimeter right hepatic low-attenuation lesion too small to characterize likely benign.   Biliary: No intrahepatic or extrahepatic bile duct dilation. Cholelithiasis without acute inflammation.   Spleen: No mass. No splenomegaly.   Pancreas: No mass or duct dilation.   Adrenals: Normal.   Kidneys: No suspicious mass, calculus or hydronephrosis.   GI tract: No bowel wall thickening or dilation. Normal appendix.   Lymph nodes: No abdominopelvic lymphadenopathy.   Mesentery/peritoneum: No free fluid, free air or fluid collection.   Vasculature: Mild abdominal aortic atherosclerotic calcifications without aneurysmal dilation.   Pelvis: No free fluid, free air or fluid collection. There is an intraluminal soft tissue mass adhere to the right anterolateral bladder dome measuring 2.1 x 1.7 cm (series 201, image 107), new compared to 08/29/2015 CT.   Bones/Soft tissues: No acute fracture or abdominal wall soft tissue abnormalities.Unchanged severe L5-S1 degenerative disc disease with minimal degenerative retrolisthesis. Bilateral total hip arthroplasties.       Intraluminal bladder soft tissue mass (2.1 x 1.7 cm), concerning for neoplasm. Correlate with cystoscopy.   MACRO: None   Signed by: Jeanne Bennett 7/1/2024 9:20 PM Dictation workstation:   [id number]     Scheduled medications  cefTRIAXone, 1 g, intravenous, q12h  cholecalciferol, 5,000 Units, oral, Daily  docusate sodium, 100 mg, oral, BID  levothyroxine, 112 mcg, oral, Daily before breakfast  lisinopril, 2.5 mg, oral, Daily  metoprolol succinate XL, 50 mg, oral, Daily  nystatin, 1 Application,  Topical, BID  rosuvastatin, 5 mg, oral, Daily      Continuous medications  D5 % and 0.9 % sodium chloride, 50 mL/hr, Last Rate: Stopped (07/03/24 0300)      PRN medications  PRN medications: acetaminophen **OR** acetaminophen **OR** acetaminophen, alum-mag hydroxide-simeth, calcium carbonate, dextromethorphan-guaifenesin, guaiFENesin, ondansetron **OR** ondansetron  Results for orders placed or performed during the hospital encounter of 07/02/24 (from the past 24 hour(s))   ECG 12 lead   Result Value Ref Range    Ventricular Rate 53 BPM    Atrial Rate 53 BPM    WI Interval 152 ms    QRS Duration 92 ms    QT Interval 410 ms    QTC Calculation(Bazett) 384 ms    P Axis 12 degrees    R Axis -11 degrees    T Axis -3 degrees    QRS Count 8 beats    Q Onset 220 ms    P Onset 144 ms    P Offset 198 ms    T Offset 425 ms    QTC Fredericia 393 ms   CBC   Result Value Ref Range    WBC 9.5 4.4 - 11.3 x10*3/uL    nRBC 0.0 0.0 - 0.0 /100 WBCs    RBC 3.66 (L) 4.50 - 5.90 x10*6/uL    Hemoglobin 12.0 (L) 13.5 - 17.5 g/dL    Hematocrit 37.0 (L) 41.0 - 52.0 %     (H) 80 - 100 fL    MCH 32.8 26.0 - 34.0 pg    MCHC 32.4 32.0 - 36.0 g/dL    RDW 12.8 11.5 - 14.5 %    Platelets 212 150 - 450 x10*3/uL       This patient has a urinary catheter   Reason for the urinary catheter remaining today? perioperative use for selected surgical procedures      Assessment/Plan   Principal Problem:    Bladder mass    78 year old male, POD 1 from TURBT  - AM labs reviewed  - maintain velasquez today - DC velasquez tomorrow - if patient successfully urinates - can be discharged home  - add Flomax 0.4 mg every day (please send 30 script at discharge)  - will arrange appointment with Dr. Bowling and place in instructions  - remainder of care per primary    Seen and discussed with Dr. Bowling         I spent 30 minutes in the professional and overall care of this patient.    Silvana Dutta, APRN-CNP

## 2024-07-03 NOTE — CARE PLAN
Problem: Pain - Adult  Goal: Verbalizes/displays adequate comfort level or baseline comfort level  Outcome: Progressing     Problem: Safety - Adult  Goal: Free from fall injury  Outcome: Progressing     Problem: Discharge Planning  Goal: Discharge to home or other facility with appropriate resources  Outcome: Progressing     Problem: Chronic Conditions and Co-morbidities  Goal: Patient's chronic conditions and co-morbidity symptoms are monitored and maintained or improved  Outcome: Progressing     Problem: Indwelling Catheter Maintenance  Goal: I will have no complications from indwelling catheter  Outcome: Progressing   The patient's goals for the shift include to get a nap    The clinical goals for the shift include pain control    Over the shift, the patient did not make progress toward the following goals. Barriers to progression include pain. Recommendations to address these barriers include meds.

## 2024-07-04 VITALS
DIASTOLIC BLOOD PRESSURE: 81 MMHG | SYSTOLIC BLOOD PRESSURE: 138 MMHG | OXYGEN SATURATION: 95 % | HEART RATE: 64 BPM | TEMPERATURE: 97.2 F | RESPIRATION RATE: 16 BRPM | HEIGHT: 69 IN | WEIGHT: 170 LBS | BODY MASS INDEX: 25.18 KG/M2

## 2024-07-04 PROBLEM — N32.89 BLADDER MASS: Status: RESOLVED | Noted: 2024-07-02 | Resolved: 2024-07-04

## 2024-07-04 PROCEDURE — 2500000001 HC RX 250 WO HCPCS SELF ADMINISTERED DRUGS (ALT 637 FOR MEDICARE OP): Performed by: INTERNAL MEDICINE

## 2024-07-04 PROCEDURE — 2500000001 HC RX 250 WO HCPCS SELF ADMINISTERED DRUGS (ALT 637 FOR MEDICARE OP): Performed by: NURSE PRACTITIONER

## 2024-07-04 PROCEDURE — 99238 HOSP IP/OBS DSCHRG MGMT 30/<: CPT | Performed by: FAMILY MEDICINE

## 2024-07-04 PROCEDURE — G0378 HOSPITAL OBSERVATION PER HR: HCPCS

## 2024-07-04 PROCEDURE — 2500000004 HC RX 250 GENERAL PHARMACY W/ HCPCS (ALT 636 FOR OP/ED): Performed by: INTERNAL MEDICINE

## 2024-07-04 PROCEDURE — 2500000002 HC RX 250 W HCPCS SELF ADMINISTERED DRUGS (ALT 637 FOR MEDICARE OP, ALT 636 FOR OP/ED): Performed by: NURSE PRACTITIONER

## 2024-07-04 ASSESSMENT — COGNITIVE AND FUNCTIONAL STATUS - GENERAL
DAILY ACTIVITIY SCORE: 24
MOBILITY SCORE: 24

## 2024-07-04 ASSESSMENT — PAIN SCALES - GENERAL: PAINLEVEL_OUTOF10: 0 - NO PAIN

## 2024-07-04 NOTE — CARE PLAN
Problem: Pain - Adult  Goal: Verbalizes/displays adequate comfort level or baseline comfort level  Outcome: Progressing     Problem: Safety - Adult  Goal: Free from fall injury  Outcome: Progressing     Problem: Discharge Planning  Goal: Discharge to home or other facility with appropriate resources  Outcome: Progressing     Problem: Chronic Conditions and Co-morbidities  Goal: Patient's chronic conditions and co-morbidity symptoms are monitored and maintained or improved  Outcome: Progressing     Problem: Indwelling Catheter Maintenance  Goal: I will have no complications from indwelling catheter  Outcome: Progressing   The patient's goals for the shift include to get a nap    The clinical goals for the shift include pain control

## 2024-07-04 NOTE — DISCHARGE SUMMARY
Discharge Diagnosis  Bladder Mass, paroxysmal atrial fibrillation, moderate aortic stenosis    Issues Requiring Follow-Up  Bladder Mass  Aortic Stenosis    Test Results Pending At Discharge  Pending Labs       Order Current Status    Surgical Pathology Exam In process            Hospital Course   Pt is a 76 yo M who was admitted for Bladder tumor with hematuria and concern for mild UTI. Patient had a transurethral resection of bladder tumor (2.1 x 1.7 cm) with no complications. UTI was treated empirically with Ceftriaxone which were stopped on discharge with negative Urine culture.  Warfarin was discontinued in the setting of hematuria and bladder tumor. Patient was started on 81 mg ASA daily and Flomax 0.4 mg daily. Patient passed voiding trial and was discharged home without a velasquez catheter. Surgical pathology results of tumor is pending. Patient will need to follow up with Urology. Patient will need an outpatient echocardiogram to follow up for Aortic Stenosis.    Pertinent Physical Exam At Time of Discharge  Physical Exam  Constitutional:       Appearance: Normal appearance.   HENT:      Head: Normocephalic and atraumatic.      Mouth/Throat:      Mouth: Mucous membranes are moist.      Pharynx: Oropharynx is clear.   Eyes:      Extraocular Movements: Extraocular movements intact.      Conjunctiva/sclera: Conjunctivae normal.      Pupils: Pupils are equal, round, and reactive to light.   Cardiovascular:      Rate and Rhythm: Normal rate. Rhythm irregular.      Heart sounds: Murmur heard.   Abdominal:      General: Abdomen is flat.      Palpations: Abdomen is soft.   Musculoskeletal:         General: Normal range of motion.      Right lower leg: No edema.      Left lower leg: No edema.   Skin:     General: Skin is warm and dry.      Capillary Refill: Capillary refill takes less than 2 seconds.   Neurological:      General: No focal deficit present.      Mental Status: He is alert and oriented to person, place,  and time. Mental status is at baseline.   Psychiatric:         Mood and Affect: Mood normal.         Behavior: Behavior normal.         Thought Content: Thought content normal.         Judgment: Judgment normal.         Home Medications     Medication List      START taking these medications     aspirin 81 mg chewable tablet; Chew 1 tablet (81 mg) once daily.   tamsulosin 0.4 mg 24 hr capsule; Commonly known as: Flomax; Take 1   capsule (0.4 mg) by mouth once daily.     CONTINUE taking these medications     cholecalciferol 5,000 Units tablet; Commonly known as: Vitamin D-3   COENZYME Q10 (BULK) MISC   lisinopril 2.5 mg tablet; TAKE 1 TABLET BY MOUTH EVERY DAY   metoprolol succinate XL 50 mg 24 hr tablet; Commonly known as:   Toprol-XL; TAKE 1 TABLET BY MOUTH EVERY DAY   Nyamyc 100,000 unit/gram powder; Generic drug: nystatin; Apply 1   Application topically 2 times a day.   rosuvastatin 5 mg tablet; Commonly known as: Crestor   STAGES MEN'S MULTI-VITAMIN ORAL   Synthroid 112 mcg tablet; Generic drug: levothyroxine; TAKE 1 TABLET BY   MOUTH EVERY DAY IN THE MORNING ON EMPTY STOMACH FOR 90 DAYS     STOP taking these medications     warfarin 2.5 mg tablet; Commonly known as: Coumadin   warfarin 5 mg tablet; Commonly known as: Coumadin   warfarin 7.5 mg tablet; Commonly known as: Coumadin       Outpatient Follow-Up  Future Appointments   Date Time Provider Department Center   7/24/2024  2:00 PM Nayeli Bowling MD [address]     Patient was staffed with Dr. Forbes,  Mar Castañeda DO, PGY-3  [facility]    Gestation:  Patient seen and examined and discussed with the resident above.  I agree with the diagnoses, hospital course and plan mentioned above

## 2024-07-05 ENCOUNTER — PATIENT OUTREACH (OUTPATIENT)
Dept: PRIMARY CARE | Facility: CLINIC | Age: 79
End: 2024-07-05
Payer: MEDICARE

## 2024-07-05 NOTE — SIGNIFICANT EVENT
Follow Up Phone Call    Outgoing phone call    Spoke to: Royce Briggs Relationship:self   Phone number: [phone]      Outcome: contacted patient/ family   No chief complaint on file.         Diagnosis:Not applicable    States he is feeling better. No further questions or concerns.

## 2024-07-05 NOTE — PROGRESS NOTES
Discharge Facility: [facility]  Discharge Diagnosis: Bladder Mass, paroxysmal atrial fibrillation, moderate aortic stenosis   Admission Date: 7/2/24  Discharge Date: 7/4/24    PCP Appointment Date: Pt will call and schedule  Specialist Appointment Date: unknown  Hospital Encounter and Summary: Linked     See discharge assessment below for further details    Engagement  Call Start Time: 1314 (7/5/2024  1:16 PM)    Medications  Medications reviewed with patient/caregiver?: Yes (7/5/2024  1:16 PM)  Is the patient having any side effects they believe may be caused by any medication additions or changes?: No (7/5/2024  1:16 PM)  Does the patient have all medications ordered at discharge?: Yes (7/5/2024  1:16 PM)  Care Management Interventions: No intervention needed (7/5/2024  1:16 PM)  Prescription Comments: pt sent home with script (7/5/2024  1:16 PM)  Is the patient taking all medications as directed (includes completed medication regime)?: Yes (7/5/2024  1:16 PM)  Care Management Interventions: Provided patient education (7/5/2024  1:16 PM)  Medication Comments: Pt denies problems obtaining or affording medication (7/5/2024  1:16 PM)    Appointments  Does the patient have a primary care provider?: Yes (pt will call to schedule his own appointment) (7/5/2024  1:16 PM)  Care Management Interventions: Educated patient on importance of making appointment (7/5/2024  1:16 PM)  Has the patient kept scheduled appointments due by today?: Yes (7/5/2024  1:16 PM)  Care Management Interventions: Educated on importance of keeping appointment (7/5/2024  1:16 PM)    Self Management  What is the home health agency?: n/a (7/5/2024  1:16 PM)  Has home health visited the patient within 72 hours of discharge?: Not applicable (7/5/2024  1:16 PM)    Patient Teaching  Does the patient have access to their discharge instructions?: Yes (7/5/2024  1:16 PM)  Care Management Interventions: Reviewed instructions with patient  From: Reina You  To: BRIGIDO Upton  Sent: 8/1/2022 8:40 AM CDT  Subject: Surgeons you are recommending    Patricio Maldonado,  I see I am approved to visit the neurosurgery office you recommended. Before I make an appointment I need the name of a physician in the office of neurosurgery. Who do you recommend?   Francisco Javier Marx (7/5/2024  1:16 PM)  What is the patient's perception of their health status since discharge?: Improving (7/5/2024  1:16 PM)  Is the patient/caregiver able to teach back the hierarchy of who to call/visit for symptoms/problems? PCP, Specialist, Home Health nurse, Urgent Care, ED, 911: Yes (7/5/2024  1:16 PM)    Wrap Up  Wrap Up Additional Comments: This CM spoke with pt via phone. Pt reports doing well at home since discharge. New meds reviewed. Pt denies CP and SOB. Pt aware of my availability for non-emergent concerns. Contact info provided to patient (7/5/2024  1:16 PM)      Cortes Nugent LPN

## 2024-07-08 LAB
ATRIAL RATE: 53 BPM
P AXIS: 12 DEGREES
P OFFSET: 198 MS
P ONSET: 144 MS
PR INTERVAL: 152 MS
Q ONSET: 220 MS
QRS COUNT: 8 BEATS
QRS DURATION: 92 MS
QT INTERVAL: 410 MS
QTC CALCULATION(BAZETT): 384 MS
QTC FREDERICIA: 393 MS
R AXIS: -11 DEGREES
T AXIS: -3 DEGREES
T OFFSET: 425 MS
VENTRICULAR RATE: 53 BPM

## 2024-07-16 ENCOUNTER — APPOINTMENT (OUTPATIENT)
Dept: PRIMARY CARE | Facility: CLINIC | Age: 79
End: 2024-07-16
Payer: MEDICARE

## 2024-07-18 ENCOUNTER — PATIENT OUTREACH (OUTPATIENT)
Dept: PRIMARY CARE | Facility: CLINIC | Age: 79
End: 2024-07-18
Payer: MEDICARE

## 2024-07-18 NOTE — PROGRESS NOTES
Call regarding appt. with PCP on (n/a) after hospitalization.  At time of outreach call the patient feels as if their condition has (improved) since last visit.  Reviewed the PCP appointment with the pt and addressed any questions or concerns.    Cortes Nugent LPN

## 2024-07-19 ENCOUNTER — OFFICE VISIT (OUTPATIENT)
Dept: PRIMARY CARE | Facility: CLINIC | Age: 79
End: 2024-07-19
Payer: MEDICARE

## 2024-07-19 VITALS
WEIGHT: 183.2 LBS | DIASTOLIC BLOOD PRESSURE: 78 MMHG | HEART RATE: 65 BPM | SYSTOLIC BLOOD PRESSURE: 132 MMHG | BODY MASS INDEX: 27.05 KG/M2

## 2024-07-19 DIAGNOSIS — R01.1 HEART MURMUR: ICD-10-CM

## 2024-07-19 DIAGNOSIS — D49.4 BLADDER TUMOR: ICD-10-CM

## 2024-07-19 DIAGNOSIS — B37.2 INTERTRIGINOUS CANDIDIASIS: Primary | ICD-10-CM

## 2024-07-19 DIAGNOSIS — I10 PRIMARY HYPERTENSION: ICD-10-CM

## 2024-07-19 PROCEDURE — 1124F ACP DISCUSS-NO DSCNMKR DOCD: CPT | Performed by: FAMILY MEDICINE

## 2024-07-19 PROCEDURE — 99214 OFFICE O/P EST MOD 30 MIN: CPT | Performed by: FAMILY MEDICINE

## 2024-07-19 PROCEDURE — 3075F SYST BP GE 130 - 139MM HG: CPT | Performed by: FAMILY MEDICINE

## 2024-07-19 PROCEDURE — 1036F TOBACCO NON-USER: CPT | Performed by: FAMILY MEDICINE

## 2024-07-19 PROCEDURE — 1159F MED LIST DOCD IN RCRD: CPT | Performed by: FAMILY MEDICINE

## 2024-07-19 PROCEDURE — 1126F AMNT PAIN NOTED NONE PRSNT: CPT | Performed by: FAMILY MEDICINE

## 2024-07-19 PROCEDURE — 3078F DIAST BP <80 MM HG: CPT | Performed by: FAMILY MEDICINE

## 2024-07-19 RX ORDER — NYSTATIN 100000 [USP'U]/G
1 POWDER TOPICAL 2 TIMES DAILY
Qty: 60 G | Refills: 1 | Status: SHIPPED | OUTPATIENT
Start: 2024-07-19 | End: 2025-07-19

## 2024-07-19 RX ORDER — METOPROLOL SUCCINATE 50 MG/1
50 TABLET, EXTENDED RELEASE ORAL DAILY
Qty: 90 TABLET | Refills: 1 | Status: SHIPPED | OUTPATIENT
Start: 2024-07-19

## 2024-07-19 RX ORDER — WARFARIN 2.5 MG/1
2.5 TABLET ORAL
COMMUNITY

## 2024-07-19 RX ORDER — WARFARIN SODIUM 5 MG/1
5 TABLET ORAL
COMMUNITY

## 2024-07-19 RX ORDER — TAMSULOSIN HYDROCHLORIDE 0.4 MG/1
0.4 CAPSULE ORAL DAILY
Qty: 90 CAPSULE | Refills: 1 | Status: SHIPPED | OUTPATIENT
Start: 2024-07-19

## 2024-07-19 ASSESSMENT — PAIN SCALES - GENERAL: PAINLEVEL: 0-NO PAIN

## 2024-07-19 NOTE — PROGRESS NOTES
Subjective   Patient ID: Royce Briggs is a 78 y.o. male who presents for Hospital Discharge Follow Up (Bladder Mass / Afib).    HPI   Here for follow from the ER. He has seen the urologist and had a tumor removed from bladder. He saw cardiology yesterday.   Uses a nystatin Rx for yeast in groin as needed. Wants a refill.   Need flomax refill. Helping with frequent urination.     Review of Systems    Objective   /78   Pulse 65   Wt 83.1 kg (183 lb 3.2 oz)   BMI 27.05 kg/m²     Physical Exam  Constitutional:       Appearance: Normal appearance.   Cardiovascular:      Rate and Rhythm: Normal rate and regular rhythm.      Heart sounds: Murmur (systolic over the sternum.) heard.      Comments: Regular rate today.   Pulmonary:      Effort: Pulmonary effort is normal.      Breath sounds: Normal breath sounds.   Neurological:      Mental Status: He is alert.   Psychiatric:         Mood and Affect: Mood normal.         Behavior: Behavior normal.           Assessment/Plan   Diagnoses and all orders for this visit:  Intertriginous candidiasis  -     nystatin (Mycostatin) 100,000 unit/gram powder; Apply 1 Application topically 2 times a day.  Bladder tumor  -     tamsulosin (Flomax) 0.4 mg 24 hr capsule; Take 1 capsule (0.4 mg) by mouth once daily.  Heart murmur  Wrote for the powder after a discussion.   He'll keep follow up with urologist this month. Continue flomax since that is helping symptoms.   Saw Dr. Wyman yesterday.   Can follow up with me in six months.

## 2024-07-24 ENCOUNTER — APPOINTMENT (OUTPATIENT)
Dept: UROLOGY | Facility: CLINIC | Age: 79
End: 2024-07-24
Payer: MEDICARE

## 2024-07-24 ENCOUNTER — PREP FOR PROCEDURE (OUTPATIENT)
Dept: UROLOGY | Facility: HOSPITAL | Age: 79
End: 2024-07-24

## 2024-07-24 DIAGNOSIS — C67.9 MALIGNANT NEOPLASM OF URINARY BLADDER, UNSPECIFIED SITE (MULTI): Primary | ICD-10-CM

## 2024-07-24 DIAGNOSIS — D49.4 BLADDER TUMOR: ICD-10-CM

## 2024-07-24 LAB
LABORATORY COMMENT REPORT: NORMAL
PATH REPORT.FINAL DX SPEC: NORMAL
PATH REPORT.GROSS SPEC: NORMAL
PATH REPORT.RELEVANT HX SPEC: NORMAL
PATH REPORT.TOTAL CANCER: NORMAL

## 2024-07-24 PROCEDURE — 99214 OFFICE O/P EST MOD 30 MIN: CPT | Performed by: STUDENT IN AN ORGANIZED HEALTH CARE EDUCATION/TRAINING PROGRAM

## 2024-07-24 PROCEDURE — G2211 COMPLEX E/M VISIT ADD ON: HCPCS | Performed by: STUDENT IN AN ORGANIZED HEALTH CARE EDUCATION/TRAINING PROGRAM

## 2024-07-24 NOTE — H&P (VIEW-ONLY)
Subjective   Patient ID: Royce Briggs is a 78 y.o. male.    HPI  77 y/o M s/o TURBT 7/2/24. 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    He recovered well from procedure. No dysuria or hematuria.     FINAL DIAGNOSIS   A. BLADDER TUMOR, TRANSURETHRAL RESECTION:    -- Noninvasive papillary urothelial carcinoma, high grade.  -- Detrusor muscle (muscularis propria) is present and is negative for malignancy.       Review of Systems    Objective   Physical Exam    Assessment/Plan   77 y/o M s/o TURBT 7/2/24. 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    He recovered well from procedure. No dysuria or hematuria.     I reviewed with the patient his pathology and the stage and grade of his cancer. Considering that he has high-grade non-muscle invasive bladder cancer, we would need to go into the bladder again and do a re-TURBT.  This would aim to reduce the risk of understaging the disease and to remove residual disease in case of persistent nonmuscle invasive bladder cancer.     I explained to the patient how this is done, and the possibility of requiring a Santana catheter following the procedure, without the need for admitting to the hospital. Following the TURBT, if high grade tumor that is not muscle-invasive is diagnosed, then the treatment will involve BCG, however if it is muscle-invasive, then more advanced treatments such as radical cystectomy is required. The patient understands and would like to proceed with the plan.    Plan:  Will plan for re-TURBT mid August (6 weeks from first TURBT)    Diagnoses and all orders for this visit:  Malignant neoplasm of urinary bladder, unspecified site (Multi)  Bladder tumor  -     Referral to Urology    Scribe Attestation  By signing my name below, Lisbet PATRICK Scribe attest that this documentation has been prepared under the direction and in the presence of Nayeli Bowling MD.

## 2024-07-29 DIAGNOSIS — C67.9 MALIGNANT NEOPLASM OF URINARY BLADDER, UNSPECIFIED SITE (MULTI): Primary | ICD-10-CM

## 2024-07-29 DIAGNOSIS — D49.4 BLADDER TUMOR: ICD-10-CM

## 2024-08-01 ENCOUNTER — PRE-ADMISSION TESTING (OUTPATIENT)
Dept: PREADMISSION TESTING | Facility: HOSPITAL | Age: 79
End: 2024-08-01
Payer: MEDICARE

## 2024-08-01 ENCOUNTER — ANESTHESIA EVENT (OUTPATIENT)
Dept: OPERATING ROOM | Facility: HOSPITAL | Age: 79
End: 2024-08-01
Payer: MEDICARE

## 2024-08-01 VITALS
SYSTOLIC BLOOD PRESSURE: 132 MMHG | RESPIRATION RATE: 17 BRPM | HEART RATE: 68 BPM | TEMPERATURE: 97.2 F | DIASTOLIC BLOOD PRESSURE: 77 MMHG

## 2024-08-01 DIAGNOSIS — R19.04 LEFT LOWER QUADRANT ABDOMINAL MASS: ICD-10-CM

## 2024-08-01 DIAGNOSIS — N39.9 RENAL AND UROLOGIC DISORDERS: ICD-10-CM

## 2024-08-01 DIAGNOSIS — N28.9 RENAL AND UROLOGIC DISORDERS: ICD-10-CM

## 2024-08-01 DIAGNOSIS — Z01.818 PREOP TESTING: Primary | ICD-10-CM

## 2024-08-01 PROBLEM — I35.0 AORTIC STENOSIS: Status: ACTIVE | Noted: 2024-08-01

## 2024-08-01 PROCEDURE — 87086 URINE CULTURE/COLONY COUNT: CPT | Mod: GENLAB

## 2024-08-01 PROCEDURE — 87081 CULTURE SCREEN ONLY: CPT | Mod: 59,GENLAB

## 2024-08-01 RX ORDER — CHLORHEXIDINE GLUCONATE 40 MG/ML
SOLUTION TOPICAL DAILY
Qty: 354 ML | Refills: 0 | Status: SHIPPED | OUTPATIENT
Start: 2024-08-01 | End: 2024-08-06

## 2024-08-01 RX ORDER — CHLORHEXIDINE GLUCONATE ORAL RINSE 1.2 MG/ML
15 SOLUTION DENTAL DAILY
Qty: 30 ML | Refills: 0 | Status: SHIPPED | OUTPATIENT
Start: 2024-08-01 | End: 2024-08-03

## 2024-08-01 ASSESSMENT — DUKE ACTIVITY SCORE INDEX (DASI)
CAN YOU WALK A BLOCK OR TWO ON LEVEL GROUND: YES
CAN YOU HAVE SEXUAL RELATIONS: YES
CAN YOU PARTICIPATE IN STRENOUS SPORTS LIKE SWIMMING, SINGLES TENNIS, FOOTBALL, BASKETBALL, OR SKIING: NO
DASI METS SCORE: 5.7
CAN YOU TAKE CARE OF YOURSELF (EAT, DRESS, BATHE, OR USE TOILET): YES
CAN YOU WALK INDOORS, SUCH AS AROUND YOUR HOUSE: YES
CAN YOU PARTICIPATE IN MODERATE RECREATIONAL ACTIVITIES LIKE GOLF, BOWLING, DANCING, DOUBLES TENNIS OR THROWING A BASEBALL OR FOOTBALL: NO
CAN YOU DO YARD WORK LIKE RAKING LEAVES, WEEDING OR PUSHING A MOWER: NO
CAN YOU DO LIGHT WORK AROUND THE HOUSE LIKE DUSTING OR WASHING DISHES: YES
CAN YOU DO HEAVY WORK AROUND THE HOUSE LIKE SCRUBBING FLOORS OR LIFTING AND MOVING HEAVY FURNITURE: NO
TOTAL_SCORE: 24.2
CAN YOU CLIMB A FLIGHT OF STAIRS OR WALK UP A HILL: YES
CAN YOU RUN A SHORT DISTANCE: NO
CAN YOU DO MODERATE WORK AROUND THE HOUSE LIKE VACUUMING, SWEEPING FLOORS OR CARRYING GROCERIES: YES

## 2024-08-01 ASSESSMENT — PAIN SCALES - GENERAL: PAINLEVEL_OUTOF10: 0 - NO PAIN

## 2024-08-01 ASSESSMENT — PAIN - FUNCTIONAL ASSESSMENT: PAIN_FUNCTIONAL_ASSESSMENT: 0-10

## 2024-08-01 NOTE — PREPROCEDURE INSTRUCTIONS
Medication List            Accurate as of August 1, 2024 12:52 PM. Always use your most recent med list.                * chlorhexidine 4 % external liquid  Commonly known as: Hibiclens  Apply topically once daily for 5 days.  Medication Adjustments for Surgery: Take morning of surgery with sip of water, no other fluids     * chlorhexidine 0.12 % solution  Commonly known as: Peridex  Use 15 mL in the mouth or throat once daily for 2 days. Once the night before surgery and once the morning of  Medication Adjustments for Surgery: Take morning of surgery with sip of water, no other fluids     cholecalciferol 5,000 Units tablet  Commonly known as: Vitamin D-3     COENZYME Q10 (BULK) MISC     lisinopril 2.5 mg tablet  TAKE 1 TABLET BY MOUTH EVERY DAY  Medication Adjustments for Surgery: Stop 1 day before surgery     metoprolol succinate XL 50 mg 24 hr tablet  Commonly known as: Toprol-XL  TAKE 1 TABLET BY MOUTH EVERY DAY     nystatin 100,000 unit/gram powder  Commonly known as: Mycostatin  Apply 1 Application topically 2 times a day.     rosuvastatin 5 mg tablet  Commonly known as: Crestor     STAGES MEN'S MULTI-VITAMIN ORAL     Synthroid 112 mcg tablet  Generic drug: levothyroxine  TAKE 1 TABLET BY MOUTH EVERY DAY IN THE MORNING ON EMPTY STOMACH FOR 90 DAYS     tamsulosin 0.4 mg 24 hr capsule  Commonly known as: Flomax  Take 1 capsule (0.4 mg) by mouth once daily.     * warfarin 5 mg tablet  Commonly known as: Coumadin     * warfarin 2.5 mg tablet  Commonly known as: Coumadin           * This list has 4 medication(s) that are the same as other medications prescribed for you. Read the directions carefully, and ask your doctor or other care provider to review them with you.                    The medication bridging plan has been discussed with the surgeon and the patient has been informed of the plan.              NPO Instructions:    Do not eat any food after midnight the night before your surgery/procedure.  You may  have clear liquids until TWO hours before surgery/procedure. This includes water, black tea/coffee, (no milk or cream) apple juice and electrolyte drinks (Gatorade).  You may chew gum up to TWO hours before your surgery/procedure.    Additional Instructions:     Review your medication instructions, take indicated medications  Wear  comfortable loose fitting clothing  Do not use moisturizers, creams, lotions or perfume  All jewelry and valuables should be left at home  We will call you the business day before your procedure between 1-3p to confirm your procedure and arrival time  Please park in the back of the hospital, in the ED parking and proceed to the second floor  Please make arrangements to have a responsible adult take you home and stay with you for 24 hours  Please bring your ID and insurance card to your procedure  When checked in to the outpatient unit, you will be asked to change into a hospital gown and remove all clothing, including underwear  An IV will be inserted   Your family or  will be asked to wait in the waiting room or cafeteria and will be notified when able to come sit with you  Please call [phone] with any further questions    Remember:  You will need cardiac clearance- We will reach out to Dr Elena office to get it  We will contact you and notify you on how long to hold coumadin  You are ok to take all morning medications EXCEPT for the coumadin and hold the morning dose of lisinopril  Remember to start using your CHG soap five days prior to the procedure and the dental rinse the night and morning of procedure

## 2024-08-01 NOTE — ANESTHESIA PREPROCEDURE EVALUATION
Patient: Royce Briggs    Procedure Information       Date/Time: 08/16/24 0825    Procedure: Cystoscopy with Ablation Tissue    Location: [facility] / [facility]    Surgeons: Nayeli Bowling MD            Relevant Problems   Anesthesia (within normal limits)      Cardiac   (+) Aortic stenosis   (+) Heart murmur   (+) Hypercholesterolemia   (+) Hypertension   (+) Longstanding persistent atrial fibrillation (Multi)      Pulmonary (within normal limits)      Neuro (within normal limits)      GI (within normal limits)      /Renal (within normal limits)      Liver (within normal limits)      Endocrine   (+) Hypothyroidism      Hematology (within normal limits)      Musculoskeletal   (+) DDD (degenerative disc disease), lumbar      HEENT (within normal limits)      ID (within normal limits)      Skin (within normal limits)      GYN (within normal limits)      Genitourinary   (+) Bladder tumor       Clinical information reviewed:                 Chart reviewed.  No new clearance ordered.  Recent TURBT 7/2/24 under [facility], no anesthesia complications/issues.    Note from TREMAYNE MENENDEZ (cardiology)- with clearance for last procedure 7/2/24    [facility] in 2021 showed normal coronary arteries     Echocardiogram from may 2023 showed LVEF 65%, moderate AS, mild MR/TR, abnormal LV relaxation    NPO Detail:  No data recorded     Physical Exam    Airway  Mallampati: II  TM distance: >3 FB  Neck ROM: full     Cardiovascular   Rhythm: regular  Rate: normal     Dental   (+) upper dentures, lower dentures     Pulmonary - normal exam     Abdominal      Other findings: Upper full denture lower partials          Anesthesia Plan    History of general anesthesia?: yes  History of complications of general anesthesia?: no    ASA 3     general     The patient is not a current smoker.  Patient was not previously instructed to abstain from smoking on day of procedure.  Patient did not smoke on day of procedure.    intravenous induction    Postoperative administration of opioids is intended.  Trial extubation is planned.  Anesthetic plan and risks discussed with patient.  Use of blood products discussed with patient who consented to blood products.    Plan discussed with CRNA.

## 2024-08-02 LAB — BACTERIA UR CULT: NORMAL

## 2024-08-03 LAB — STAPHYLOCOCCUS SPEC CULT: NORMAL

## 2024-08-06 ENCOUNTER — LAB (OUTPATIENT)
Dept: LAB | Facility: LAB | Age: 79
End: 2024-08-06
Payer: MEDICARE

## 2024-08-06 ENCOUNTER — TELEPHONE (OUTPATIENT)
Dept: PRIMARY CARE | Facility: CLINIC | Age: 79
End: 2024-08-06

## 2024-08-06 DIAGNOSIS — I48.11 LONGSTANDING PERSISTENT ATRIAL FIBRILLATION (MULTI): ICD-10-CM

## 2024-08-06 LAB
INR PPP: 2.5 (ref 0.9–1.1)
PROTHROMBIN TIME: 28.8 SECONDS (ref 9.8–12.8)

## 2024-08-06 PROCEDURE — 36415 COLL VENOUS BLD VENIPUNCTURE: CPT

## 2024-08-08 ENCOUNTER — PATIENT OUTREACH (OUTPATIENT)
Dept: PRIMARY CARE | Facility: CLINIC | Age: 79
End: 2024-08-08
Payer: MEDICARE

## 2024-08-08 NOTE — PROGRESS NOTES
Successful outreach to patient regarding hospitalization as patient continues TCM program.   At time of outreach call the patient feels as if their condition has (improved) since initial visit with PCP or specialist.  Questions or concerns addressed at this time with patient.   Provided contact information to patient if any further non-emergent needs arise.     Cortes Nugent LPN

## 2024-08-16 ENCOUNTER — HOSPITAL ENCOUNTER (OUTPATIENT)
Facility: HOSPITAL | Age: 79
Setting detail: OUTPATIENT SURGERY
Discharge: HOME | End: 2024-08-16
Attending: STUDENT IN AN ORGANIZED HEALTH CARE EDUCATION/TRAINING PROGRAM | Admitting: STUDENT IN AN ORGANIZED HEALTH CARE EDUCATION/TRAINING PROGRAM
Payer: MEDICARE

## 2024-08-16 ENCOUNTER — ANESTHESIA (OUTPATIENT)
Dept: OPERATING ROOM | Facility: HOSPITAL | Age: 79
End: 2024-08-16
Payer: MEDICARE

## 2024-08-16 VITALS
SYSTOLIC BLOOD PRESSURE: 123 MMHG | WEIGHT: 183 LBS | DIASTOLIC BLOOD PRESSURE: 62 MMHG | RESPIRATION RATE: 14 BRPM | OXYGEN SATURATION: 96 % | TEMPERATURE: 97.7 F | HEIGHT: 69 IN | HEART RATE: 54 BPM | BODY MASS INDEX: 27.11 KG/M2

## 2024-08-16 DIAGNOSIS — D49.4 BLADDER TUMOR: ICD-10-CM

## 2024-08-16 DIAGNOSIS — C67.9 MALIGNANT NEOPLASM OF URINARY BLADDER, UNSPECIFIED SITE (MULTI): ICD-10-CM

## 2024-08-16 LAB
INR PPP: 1 (ref 0.9–1.1)
PROTHROMBIN TIME: 11.2 SECONDS (ref 9.8–12.8)

## 2024-08-16 PROCEDURE — 7100000002 HC RECOVERY ROOM TIME - EACH INCREMENTAL 1 MINUTE: Performed by: STUDENT IN AN ORGANIZED HEALTH CARE EDUCATION/TRAINING PROGRAM

## 2024-08-16 PROCEDURE — 2500000005 HC RX 250 GENERAL PHARMACY W/O HCPCS: Performed by: STUDENT IN AN ORGANIZED HEALTH CARE EDUCATION/TRAINING PROGRAM

## 2024-08-16 PROCEDURE — 3600000003 HC OR TIME - INITIAL BASE CHARGE - PROCEDURE LEVEL THREE: Performed by: STUDENT IN AN ORGANIZED HEALTH CARE EDUCATION/TRAINING PROGRAM

## 2024-08-16 PROCEDURE — 7100000009 HC PHASE TWO TIME - INITIAL BASE CHARGE: Performed by: STUDENT IN AN ORGANIZED HEALTH CARE EDUCATION/TRAINING PROGRAM

## 2024-08-16 PROCEDURE — 2500000004 HC RX 250 GENERAL PHARMACY W/ HCPCS (ALT 636 FOR OP/ED): Performed by: PHYSICIAN ASSISTANT

## 2024-08-16 PROCEDURE — 52235 CYSTOSCOPY AND TREATMENT: CPT | Performed by: STUDENT IN AN ORGANIZED HEALTH CARE EDUCATION/TRAINING PROGRAM

## 2024-08-16 PROCEDURE — 2720000007 HC OR 272 NO HCPCS: Performed by: STUDENT IN AN ORGANIZED HEALTH CARE EDUCATION/TRAINING PROGRAM

## 2024-08-16 PROCEDURE — 2500000005 HC RX 250 GENERAL PHARMACY W/O HCPCS

## 2024-08-16 PROCEDURE — 7100000010 HC PHASE TWO TIME - EACH INCREMENTAL 1 MINUTE: Performed by: STUDENT IN AN ORGANIZED HEALTH CARE EDUCATION/TRAINING PROGRAM

## 2024-08-16 PROCEDURE — 3600000008 HC OR TIME - EACH INCREMENTAL 1 MINUTE - PROCEDURE LEVEL THREE: Performed by: STUDENT IN AN ORGANIZED HEALTH CARE EDUCATION/TRAINING PROGRAM

## 2024-08-16 PROCEDURE — 7100000001 HC RECOVERY ROOM TIME - INITIAL BASE CHARGE: Performed by: STUDENT IN AN ORGANIZED HEALTH CARE EDUCATION/TRAINING PROGRAM

## 2024-08-16 PROCEDURE — 85610 PROTHROMBIN TIME: CPT | Performed by: PHYSICIAN ASSISTANT

## 2024-08-16 PROCEDURE — 2500000004 HC RX 250 GENERAL PHARMACY W/ HCPCS (ALT 636 FOR OP/ED): Performed by: NURSE ANESTHETIST, CERTIFIED REGISTERED

## 2024-08-16 PROCEDURE — 36415 COLL VENOUS BLD VENIPUNCTURE: CPT | Performed by: PHYSICIAN ASSISTANT

## 2024-08-16 PROCEDURE — 3700000002 HC GENERAL ANESTHESIA TIME - EACH INCREMENTAL 1 MINUTE: Performed by: STUDENT IN AN ORGANIZED HEALTH CARE EDUCATION/TRAINING PROGRAM

## 2024-08-16 PROCEDURE — 88307 TISSUE EXAM BY PATHOLOGIST: CPT | Mod: TC,SUR,GENLAB

## 2024-08-16 PROCEDURE — 3700000001 HC GENERAL ANESTHESIA TIME - INITIAL BASE CHARGE: Performed by: STUDENT IN AN ORGANIZED HEALTH CARE EDUCATION/TRAINING PROGRAM

## 2024-08-16 PROCEDURE — 2500000005 HC RX 250 GENERAL PHARMACY W/O HCPCS: Performed by: NURSE ANESTHETIST, CERTIFIED REGISTERED

## 2024-08-16 RX ORDER — PHENAZOPYRIDINE HYDROCHLORIDE 100 MG/1
200 TABLET, FILM COATED ORAL ONCE AS NEEDED
Status: DISCONTINUED | OUTPATIENT
Start: 2024-08-16 | End: 2024-08-16 | Stop reason: HOSPADM

## 2024-08-16 RX ORDER — ONDANSETRON HYDROCHLORIDE 2 MG/ML
4 INJECTION, SOLUTION INTRAVENOUS ONCE AS NEEDED
Status: DISCONTINUED | OUTPATIENT
Start: 2024-08-16 | End: 2024-08-16 | Stop reason: HOSPADM

## 2024-08-16 RX ORDER — SODIUM CHLORIDE, SODIUM LACTATE, POTASSIUM CHLORIDE, CALCIUM CHLORIDE 600; 310; 30; 20 MG/100ML; MG/100ML; MG/100ML; MG/100ML
30 INJECTION, SOLUTION INTRAVENOUS CONTINUOUS
Status: DISCONTINUED | OUTPATIENT
Start: 2024-08-16 | End: 2024-08-16 | Stop reason: HOSPADM

## 2024-08-16 RX ORDER — PROPOFOL 10 MG/ML
INJECTION, EMULSION INTRAVENOUS AS NEEDED
Status: DISCONTINUED | OUTPATIENT
Start: 2024-08-16 | End: 2024-08-16

## 2024-08-16 RX ORDER — SODIUM CHLORIDE, SODIUM LACTATE, POTASSIUM CHLORIDE, CALCIUM CHLORIDE 600; 310; 30; 20 MG/100ML; MG/100ML; MG/100ML; MG/100ML
100 INJECTION, SOLUTION INTRAVENOUS CONTINUOUS
Status: DISCONTINUED | OUTPATIENT
Start: 2024-08-16 | End: 2024-08-16 | Stop reason: HOSPADM

## 2024-08-16 RX ORDER — ACETAMINOPHEN 325 MG/1
650 TABLET ORAL EVERY 4 HOURS PRN
Status: DISCONTINUED | OUTPATIENT
Start: 2024-08-16 | End: 2024-08-16 | Stop reason: HOSPADM

## 2024-08-16 RX ORDER — OXYCODONE HYDROCHLORIDE 5 MG/1
10 TABLET ORAL EVERY 4 HOURS PRN
Status: DISCONTINUED | OUTPATIENT
Start: 2024-08-16 | End: 2024-08-16 | Stop reason: HOSPADM

## 2024-08-16 RX ORDER — OXYCODONE HYDROCHLORIDE 5 MG/1
5 TABLET ORAL EVERY 4 HOURS PRN
Status: DISCONTINUED | OUTPATIENT
Start: 2024-08-16 | End: 2024-08-16 | Stop reason: HOSPADM

## 2024-08-16 RX ORDER — GLYCOPYRROLATE 0.2 MG/ML
INJECTION INTRAMUSCULAR; INTRAVENOUS AS NEEDED
Status: DISCONTINUED | OUTPATIENT
Start: 2024-08-16 | End: 2024-08-16

## 2024-08-16 RX ORDER — FENTANYL CITRATE 50 UG/ML
INJECTION, SOLUTION INTRAMUSCULAR; INTRAVENOUS AS NEEDED
Status: DISCONTINUED | OUTPATIENT
Start: 2024-08-16 | End: 2024-08-16

## 2024-08-16 RX ORDER — ONDANSETRON HYDROCHLORIDE 2 MG/ML
INJECTION, SOLUTION INTRAVENOUS AS NEEDED
Status: DISCONTINUED | OUTPATIENT
Start: 2024-08-16 | End: 2024-08-16

## 2024-08-16 RX ORDER — SODIUM CHLORIDE 0.9 G/100ML
IRRIGANT IRRIGATION AS NEEDED
Status: DISCONTINUED | OUTPATIENT
Start: 2024-08-16 | End: 2024-08-16 | Stop reason: HOSPADM

## 2024-08-16 RX ORDER — CHLORHEXIDINE GLUCONATE ORAL RINSE 1.2 MG/ML
SOLUTION DENTAL
COMMUNITY
Start: 2024-08-01 | End: 2024-08-16 | Stop reason: HOSPADM

## 2024-08-16 RX ORDER — ACETAMINOPHEN 325 MG/1
975 TABLET ORAL ONCE
Status: COMPLETED | OUTPATIENT
Start: 2024-08-16 | End: 2024-08-16

## 2024-08-16 RX ORDER — LIDOCAINE HYDROCHLORIDE 20 MG/ML
INJECTION, SOLUTION EPIDURAL; INFILTRATION; INTRACAUDAL; PERINEURAL AS NEEDED
Status: DISCONTINUED | OUTPATIENT
Start: 2024-08-16 | End: 2024-08-16

## 2024-08-16 RX ORDER — NORETHINDRONE AND ETHINYL ESTRADIOL 0.5-0.035
KIT ORAL AS NEEDED
Status: DISCONTINUED | OUTPATIENT
Start: 2024-08-16 | End: 2024-08-16

## 2024-08-16 RX ORDER — PHENYLEPHRINE HCL IN 0.9% NACL 0.4MG/10ML
SYRINGE (ML) INTRAVENOUS AS NEEDED
Status: DISCONTINUED | OUTPATIENT
Start: 2024-08-16 | End: 2024-08-16

## 2024-08-16 RX ORDER — ROCURONIUM BROMIDE 10 MG/ML
INJECTION, SOLUTION INTRAVENOUS AS NEEDED
Status: DISCONTINUED | OUTPATIENT
Start: 2024-08-16 | End: 2024-08-16

## 2024-08-16 RX ORDER — CEFAZOLIN SODIUM 2 G/50ML
2 SOLUTION INTRAVENOUS ONCE
Status: COMPLETED | OUTPATIENT
Start: 2024-08-16 | End: 2024-08-16

## 2024-08-16 SDOH — HEALTH STABILITY: MENTAL HEALTH: CURRENT SMOKER: 0

## 2024-08-16 ASSESSMENT — PAIN - FUNCTIONAL ASSESSMENT
PAIN_FUNCTIONAL_ASSESSMENT: 0-10

## 2024-08-16 ASSESSMENT — PAIN SCALES - GENERAL
PAINLEVEL_OUTOF10: 0 - NO PAIN
PAINLEVEL_OUTOF10: 0 - NO PAIN
PAIN_LEVEL: 0
PAINLEVEL_OUTOF10: 0 - NO PAIN

## 2024-08-16 NOTE — ANESTHESIA PROCEDURE NOTES
Airway  Date/Time: 8/16/2024 8:24 AM  Urgency: elective    Airway not difficult    Staffing  Performed: CRNA   Authorized by: DONNELL Avila    Performed by: DONNELL Avila  Patient location during procedure: OR    Indications and Patient Condition  Indications for airway management: anesthesia  Spontaneous Ventilation: absent  Sedation level: deep  Preoxygenated: yes  Patient position: sniffing  Mask difficulty assessment: 1 - vent by mask  Planned trial extubation    Final Airway Details  Final airway type: endotracheal airway      Successful airway: ETT  Cuffed: yes   Successful intubation technique: direct laryngoscopy  Facilitating devices/methods: intubating stylet  Blade: Christopher  Blade size: #3  ETT size (mm): 7.5  Cormack-Lehane Classification: grade IIa - partial view of glottis  Placement verified by: chest auscultation and capnometry   Cuff volume (mL): 7  Measured from: lips  ETT to lips (cm): 22  Number of attempts at approach: 1  Ventilation between attempts: none  Number of other approaches attempted: 0    Additional Comments  Atraumatic intubation mac 3 blade. Lips teeth intact

## 2024-08-16 NOTE — ANESTHESIA POSTPROCEDURE EVALUATION
"Patient: Royce Briggs \"Al\"    Procedure Summary       Date: 08/16/24 Room / Location: [facility] / [facility]    Anesthesia Start: 0812 Anesthesia Stop: 0904    Procedure: Transurethral Resection of Bladder Tumor Diagnosis:       Malignant neoplasm of urinary bladder, unspecified site (Multi)      Bladder tumor      (Malignant neoplasm of urinary bladder, unspecified site (Multi) [C67.9])      (Bladder tumor [D49.4])    Surgeons: Nayeli Bowling MD Responsible Provider: DONNELL Avila    Anesthesia Type: general ASA Status: 3            Anesthesia Type: general    Vitals Value Taken Time   /94 08/16/24 0931   Temp 36.5 °C (97.7 °F) 08/16/24 0931   Pulse 58 08/16/24 0931   Resp 14 08/16/24 0931   SpO2 98 % 08/16/24 0931       Anesthesia Post Evaluation    Patient location during evaluation: PACU  Patient participation: complete - patient participated  Level of consciousness: awake and awake and alert  Pain score: 0  Pain management: adequate  Airway patency: patent  Cardiovascular status: acceptable  Respiratory status: acceptable, spontaneous ventilation and room air  Hydration status: acceptable  Postoperative Nausea and Vomiting: none        There were no known notable events for this encounter.    "

## 2024-08-16 NOTE — OP NOTE
"Transurethral Resection of Bladder Tumor Operative Note     Date: 2024  OR Location: [facility]    Name: Royce Briggs \"Al\", : 1945, Age: 78 y.o., MRN: [id number], Sex: male    Diagnosis  Pre-op Diagnosis      * Malignant neoplasm of urinary bladder, unspecified site (Multi) [C67.9]     * Bladder tumor [D49.4] Post-op Diagnosis     * Malignant neoplasm of urinary bladder, unspecified site (Multi) [C67.9]     * Bladder tumor [D49.4]     Procedures  Transurethral Resection of Bladder Tumor  56719 - MI CYSTOURETHROSCOPY W/DEST &/RMVL MED BLADDER STEFANI      Surgeons      * Nayeli Bowling - Primary    Resident/Fellow/Other Assistant:  Surgeons and Role:  * No surgeons found with a matching role *    Procedure Summary  Anesthesia: General  ASA: III  Anesthesia Staff: CRNA: KANG Avila-CRNA  Estimated Blood Loss: 1 mL  Intra-op Medications:   Administrations occurring from 0815 to 925 on 24:   Medication Name Total Dose   sodium chloride 0.9 % irrigation solution 1,000 mL   lactated Ringer's infusion Cannot be calculated   ceFAZolin (Ancef) 2 g in dextrose (iso) IV 50 mL Cannot be calculated              Anesthesia Record               Intraprocedure I/O Totals          Intake    Propofol Drip 0.00 mL    The total shown is the total volume documented since Anesthesia Start was filed.    lactated Ringer's infusion 700.00 mL    ceFAZolin (Ancef) 2 g in dextrose (iso) IV 50 mL 50.00 mL    Total Intake 750 mL          Specimen:   ID Type Source Tests Collected by Time   1 : site of prior resection Tissue BLADDER BIOPSY SURGICAL PATHOLOGY EXAM Nayeli Bowling MD 2024 [id number]        Staff:   Nasraulator: Ingrid  Circulator: Monae Alvarado Person: Thelma         Drains and/or Catheters:   Urethral Catheter 18 Fr. (Active)   Site Assessment Clean;Skin intact 24   Collection Container Standard drainage bag 24   Securement Method Securing device (Describe) 24 "   Reason for Continuing Urinary Catheterization surgical procedures: urological/gynecological, pelvic oncology, anal, prolonged surgical procedure 08/16/24 0901       Tourniquet Times:         Implants:     Findings: site of prior resection on right anterolateral wall, no other recurrence    Indications: Demarcus Briggs is an 78 y.o. male who is having surgery for Malignant neoplasm of urinary bladder, unspecified site (Multi) [C67.9]  Bladder tumor [D49.4].     The patient was seen in the preoperative area. The risks, benefits, complications, treatment options, non-operative alternatives, expected recovery and outcomes were discussed with the patient. The possibilities of reaction to medication, pulmonary aspiration, injury to surrounding structures, bleeding, recurrent infection, the need for additional procedures, failure to diagnose a condition, and creating a complication requiring transfusion or operation were discussed with the patient. The patient concurred with the proposed plan, giving informed consent.  The site of surgery was properly noted/marked if necessary per policy. The patient has been actively warmed in preoperative area. Preoperative antibiotics have been ordered and given within 1 hours of incision. Venous thrombosis prophylaxis have been ordered including bilateral sequential compression devices    Procedure Details: Patient was consented and antibiotics were started on-call to the OR.  In the operating room, under general anesthesia, patient placed in dorsolithotomy position, genitalia was prepped and draped in usual manner.  No 26 resectoscope was inserted down the urethra into the bladder, and cystoscopy revealed the area of prior resection on the right anterolateral wall, with no other recurrences.  Using the loop on the working element, the area of granulation with underlying erythema and inflammation was resected down to the muscle and fat layer.  This was done the whole surface of the  prior resected tumor, a total of 2-2.5cm.  Extensive hemostasis was performed for the underlying base of the tumor as well as the surrounding mucosa.  All resected chips were removed and sent for pathological examination.   #18 Andorran Santana catheter was inserted.  Patient was awakened and transferred to PACU in stable condition.    Complications:  None; patient tolerated the procedure well.    Disposition: PACU - hemodynamically stable.  Condition: stable         Additional Details:     Attending Attestation: I performed the procedure.    Nayeli Bowling  Phone Number: [phone]

## 2024-08-19 ASSESSMENT — PAIN SCALES - GENERAL: PAINLEVEL_OUTOF10: 0 - NO PAIN

## 2024-09-04 ENCOUNTER — APPOINTMENT (OUTPATIENT)
Dept: UROLOGY | Facility: CLINIC | Age: 79
End: 2024-09-04
Payer: MEDICARE

## 2024-09-04 DIAGNOSIS — N40.1 BENIGN PROSTATIC HYPERPLASIA WITH LOWER URINARY TRACT SYMPTOMS, SYMPTOM DETAILS UNSPECIFIED: ICD-10-CM

## 2024-09-04 DIAGNOSIS — C67.9 MALIGNANT NEOPLASM OF URINARY BLADDER, UNSPECIFIED SITE (MULTI): Primary | ICD-10-CM

## 2024-09-04 DIAGNOSIS — R39.9 LOWER URINARY TRACT SYMPTOMS (LUTS): ICD-10-CM

## 2024-09-04 DIAGNOSIS — I48.11 LONGSTANDING PERSISTENT ATRIAL FIBRILLATION (MULTI): ICD-10-CM

## 2024-09-04 PROCEDURE — 87086 URINE CULTURE/COLONY COUNT: CPT

## 2024-09-04 PROCEDURE — G2211 COMPLEX E/M VISIT ADD ON: HCPCS | Performed by: STUDENT IN AN ORGANIZED HEALTH CARE EDUCATION/TRAINING PROGRAM

## 2024-09-04 PROCEDURE — 99214 OFFICE O/P EST MOD 30 MIN: CPT | Performed by: STUDENT IN AN ORGANIZED HEALTH CARE EDUCATION/TRAINING PROGRAM

## 2024-09-04 RX ORDER — LISINOPRIL 2.5 MG/1
2.5 TABLET ORAL DAILY
Qty: 90 TABLET | Refills: 1 | Status: SHIPPED | OUTPATIENT
Start: 2024-09-04

## 2024-09-04 NOTE — PROGRESS NOTES
Subjective   Patient ID: Demarcus Briggs is a 78 y.o. male.    HPI  77 y/o M s/p 2nd stage TURBT 8/16/2024. Path showed urothelial mucosa and muscularis propria with changes consistent with previous procedure and associated reparative atypia.     Initial path showed  Noninvasive papillary urothelial carcinoma, high grad.     He recovered well from surgery. No major  issues after. Hematuria resolved.     FINAL DIAGNOSIS   A. Urinary bladder, site of prior resection, transurethral resection:  -- Urothelial mucosa and muscularis propria with changes consistent with previous procedure and associated reparative atypia  -- No evidence of carcinoma     FINAL DIAGNOSIS 7/2/24   A. BLADDER TUMOR, TRANSURETHRAL RESECTION:    -- Noninvasive papillary urothelial carcinoma, high grade.  -- Detrusor muscle (muscularis propria) is present and is negative for malignancy.     Review of Systems    Objective   Physical Exam    Assessment/Plan   77 y/o M s/p TURBT 8/16/2024. Path showed urothelial mucosa and muscularis propria with changes consistent with previous procedure and associated reparative atypia.     Initial path showed  Noninvasive papillary urothelial carcinoma, high grad.     He recovered well from surgery. No major  issues after. Hematuria resolved.     I reviewed with the patient his pathology and the stage and grade of his cancer. Considering that he has high-grade non-muscle invasive bladder cancer, the gold standard treatment is intravesical BCG. This is an attenuated form of the Mycobacterium tuberculosis strain, which when given inside the bladder, results in a local inflammatory reaction with the flow of cytokines and inflammatory cells, resulting in a response protective against the recurrence of bladder cancer. Without BCG, the risk of recurrence of HG NMIBC is 70-80%, whereas with BCG the remission is 50-70%.    BCG treatment can result in worse urinary symptoms, hematuria, fever/chills, and generalized fatigue,   and these symptoms can range from mild self-resolving in 1-2 days, to severe and intolerable. Typically, symptoms get worse after treatments. The extreme would be systemic tuberculosis infection which is extremely rare.    Schedule will consist of 6 weekly instillations for the induction course, and if remission is documented then we move to 3-weekly instillations at 3-6-12-18-24-30-36 months.    The treatment is administered through a urethral indwelling catheter, kept in the bladder for 2 hours, after which the patient will be asked to urinate to clear the BCG.    Plan:  BCG x 6.   Cystoscopy 6 weeks after BCG.   Continue tamsulosin 0.4  mg 1 pill daily at bedtime.     Diagnoses and all orders for this visit:  Malignant neoplasm of urinary bladder, unspecified site (Multi)  -     Urine Culture  Benign prostatic hyperplasia with lower urinary tract symptoms, symptom details unspecified  Lower urinary tract symptoms (LUTS)  -     Urine Culture    Scribe Attestation  By signing my name below, Lisbet PATRICK Scribe attest that this documentation has been prepared under the direction and in the presence of Nayeli Bowling MD.

## 2024-09-06 LAB — BACTERIA UR CULT: NO GROWTH

## 2024-09-12 ENCOUNTER — HOSPITAL ENCOUNTER (EMERGENCY)
Facility: HOSPITAL | Age: 79
Discharge: HOME | End: 2024-09-12
Payer: MEDICARE

## 2024-09-12 ENCOUNTER — APPOINTMENT (OUTPATIENT)
Dept: RADIOLOGY | Facility: HOSPITAL | Age: 79
End: 2024-09-12
Payer: MEDICARE

## 2024-09-12 VITALS
BODY MASS INDEX: 27.11 KG/M2 | OXYGEN SATURATION: 98 % | WEIGHT: 183 LBS | RESPIRATION RATE: 16 BRPM | HEIGHT: 69 IN | TEMPERATURE: 97.6 F | HEART RATE: 84 BPM | SYSTOLIC BLOOD PRESSURE: 137 MMHG | DIASTOLIC BLOOD PRESSURE: 62 MMHG

## 2024-09-12 DIAGNOSIS — S61.215A LACERATION OF LEFT RING FINGER WITHOUT FOREIGN BODY WITHOUT DAMAGE TO NAIL, INITIAL ENCOUNTER: Primary | ICD-10-CM

## 2024-09-12 PROCEDURE — 90471 IMMUNIZATION ADMIN: CPT

## 2024-09-12 PROCEDURE — 73130 X-RAY EXAM OF HAND: CPT | Mod: LEFT SIDE | Performed by: RADIOLOGY

## 2024-09-12 PROCEDURE — 2500000004 HC RX 250 GENERAL PHARMACY W/ HCPCS (ALT 636 FOR OP/ED)

## 2024-09-12 PROCEDURE — 2500000005 HC RX 250 GENERAL PHARMACY W/O HCPCS

## 2024-09-12 PROCEDURE — 12001 RPR S/N/AX/GEN/TRNK 2.5CM/<: CPT

## 2024-09-12 PROCEDURE — 99283 EMERGENCY DEPT VISIT LOW MDM: CPT | Mod: 25

## 2024-09-12 PROCEDURE — 90715 TDAP VACCINE 7 YRS/> IM: CPT

## 2024-09-12 PROCEDURE — 73130 X-RAY EXAM OF HAND: CPT | Mod: LT

## 2024-09-12 RX ORDER — LIDOCAINE HYDROCHLORIDE 10 MG/ML
10 INJECTION, SOLUTION INFILTRATION; PERINEURAL ONCE
Status: COMPLETED | OUTPATIENT
Start: 2024-09-12 | End: 2024-09-12

## 2024-09-12 ASSESSMENT — COLUMBIA-SUICIDE SEVERITY RATING SCALE - C-SSRS
2. HAVE YOU ACTUALLY HAD ANY THOUGHTS OF KILLING YOURSELF?: NO
1. IN THE PAST MONTH, HAVE YOU WISHED YOU WERE DEAD OR WISHED YOU COULD GO TO SLEEP AND NOT WAKE UP?: NO
6. HAVE YOU EVER DONE ANYTHING, STARTED TO DO ANYTHING, OR PREPARED TO DO ANYTHING TO END YOUR LIFE?: NO

## 2024-09-12 ASSESSMENT — PAIN SCALES - GENERAL: PAINLEVEL_OUTOF10: 2

## 2024-09-12 ASSESSMENT — PAIN DESCRIPTION - LOCATION: LOCATION: HAND

## 2024-09-12 ASSESSMENT — PAIN - FUNCTIONAL ASSESSMENT: PAIN_FUNCTIONAL_ASSESSMENT: 0-10

## 2024-09-12 ASSESSMENT — PAIN DESCRIPTION - PAIN TYPE: TYPE: ACUTE PAIN

## 2024-09-12 ASSESSMENT — PAIN DESCRIPTION - ORIENTATION: ORIENTATION: LEFT

## 2024-09-12 NOTE — ED PROVIDER NOTES
HPI   Chief Complaint   Patient presents with    Laceration     Was using a woodworking tool called a router and it went through his glove and cut his hand. He has it wrapped up in triage but states it is cut near the 3rd knuckle        Patient is a 78-year-old male with significant PMH of A-fib on warfarin presents to the ED with cc of left hand injury today.  Patient states he was working a rotor that went through his glove and cut his fourth digit MCP joint.  Patient is right-handed.  Patient denies any numbness or tingling.  Patient is unsure if he is up-to-date on his tetanus.  Patient has full range of motion of digits and extremities.  Patient denies any other complaints.              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    Hematospermia     Hematuria 07/01/2024    History of backache 01/14/2014    History of depression 01/14/2014    History of esophageal ulcer 01/14/2014    History of mumps 01/14/2014    History of rectal bleeding     History of rubella 01/14/2014    HLD (hyperlipidemia)     HTN (hypertension)     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    Avascular necrosis of hip, left    Normal cardiac stress test 01/14/2014    On Coumadin for atrial fibrillation     Personal history of other diseases of the circulatory system     History of cardiac disorder    Rhinitis, chronic     Tick bite 04/09/2022    Ulcer 01/14/2014    Undescended testicle     Unspecified abdominal hernia without obstruction or gangrene 01/14/2014    Hernia     Past Surgical History:   Procedure Laterality Date    CARDIAC CATHETERIZATION  04/15/2014    Cardiac Cath Procedure Outcome:     COLONOSCOPY W/ POLYPECTOMY  06/27/2024    HERNIA REPAIR  04/15/2014    Inguinal Hernia Repair     No family history on file.  Social History     Tobacco Use    Smoking status: Former     Current packs/day: 1.00     Average packs/day: 1 pack/day for 10.0 years (10.0 ttl pk-yrs)     Types: Cigarettes    Smokeless tobacco: Never   Vaping Use    Vaping status: Never Used   Substance Use Topics    Alcohol use: Yes     Alcohol/week: 2.0 standard drinks of alcohol     Types: 2 Glasses of wine per week    Drug use: Never       Physical Exam   ED Triage Vitals [09/12/24 1352]   Temperature Heart Rate Respirations BP   36.4 °C (97.6 °F) 97 16 134/66      Pulse Ox Temp src Heart Rate Source Patient Position   96 % -- Monitor Sitting      BP Location FiO2 (%)     Left arm --       Physical Exam  HENT:      Head: Normocephalic.   Cardiovascular:      Pulses: Normal pulses.   Pulmonary:      Effort: Pulmonary effort is normal.   Skin:     Capillary Refill: Capillary refill takes less than 2 seconds.      Findings: Lesion (1.5cm curved laceration on L hand 4th digit mcp joint) present.   Neurological:      General: No focal deficit present.      Mental Status: He is alert and oriented to person, place, and time. Mental status is at baseline.           ED Course & MDM   Diagnoses as of 09/12/24 1512   Laceration of left ring finger without foreign body without damage to nail, initial encounter                 No data recorded                                 Medical Decision Making  Medical Decision Making:  Patient presented as described in HPI. Patient case including ROS, PE, and treatment and plan discussed with ED attending if attached as cosigner. Due to patients presentation orders completed include as documented.  Patient presents to the ED with cc of left patient to the left hand.  Patient is nontoxic-appearing full and equal pulses good capillary refill full range of motion of digits no pain on palpation 1.5 cm curved  laceration on the left hand fourth digit MCP joint.  Wound is cleansed.  Patient updated on his tetanus.  Patient is denying any pain medication at this time.  Patient did not want any lidocaine.  PA student Jeremiah Otero performed the sutures with my direct supervision.  Patient tolerated procedure well.  Patient educated on laceration care and removal.  Patient educated on any worsening symptoms return.  Patient x-ray is negative.  Patient remained stable and discharged.  Patient was advised to follow up with PCP or recommended provider in 2-3 days for another evaluation and exam. I advised patient/guardian to return or go to closest emergency room immediately if symptoms change, get worse, new symptoms develop prior to follow up. If there is no improvement in symptoms in the next 24 hours they are advised to return for further evaluation and exam. I also explained the plan and treatment course. Patient/guardian is in agreement with plan, treatment course, and follow up and states verbally that they will comply.      Patient care discussed with: N/A  Social Determinants affecting care: N/A    Final diagnosis and disposition as below.  See CI    Homegoing. I discussed the differential; results and discharge plan with the patient and/or family/friend/caregiver if present.  I emphasized the importance of follow-up with the physician I referred them to in the timeframe recommended.  I explained reasons for the patient to return to the Emergency Department. They agreed that if they feel their condition is worsening or if they have any other concern they should call 911 immediately for further assistance. I gave the patient an opportunity to ask all questions they had and answered all of them accordingly. They understand return precautions and discharge instructions. The patient and/or family/friend/caregiver expressed understanding verbally and that they would comply.       Disposition:  Discharge      This note has been  transcribed using voice recognition and may contain grammatical errors, misplaced words, incorrect words, incorrect phrases or other errors.          Procedure  Laceration Repair    Performed by: Nishi Mendenhall PA-C  Authorized by: Shaquille Martinez DO    Consent:     Consent obtained:  Verbal    Risks discussed:  Infection and pain  Anesthesia:     Anesthesia method:  None  Laceration details:     Location:  Finger    Finger location:  L ring finger    Length (cm):  1.5  Treatment:     Area cleansed with:  Povidone-iodine    Amount of cleaning:  Standard    Irrigation solution:  Sterile saline    Debridement:  None  Skin repair:     Repair method:  Sutures    Suture size:  4-0    Suture material:  Prolene    Number of sutures:  3  Approximation:     Approximation:  Close  Repair type:     Repair type:  Simple  Post-procedure details:     Dressing:  Non-adherent dressing    XR hand left 3+ views   Final Result   1. No fracture or dislocation.        MACRO:   None.        Signed by: Joel Bond 9/12/2024 2:40 PM   Dictation workstation:   [id number]            Nishi Mendenhall PA-C  09/16/24 2626

## 2024-09-13 ENCOUNTER — TELEPHONE (OUTPATIENT)
Dept: PRIMARY CARE | Facility: CLINIC | Age: 79
End: 2024-09-13
Payer: MEDICARE

## 2024-09-13 NOTE — TELEPHONE ENCOUNTER
Patient is asking if he needs to have an abx because he had stitches in his finger yesterday and he has had his hip replaced.  He said his cousin told him that because he has had the hip replacements he should have an antibiotic.  Please advise.  He uses [facility] in [place]

## 2024-09-15 NOTE — PROGRESS NOTES
[facility]     HISTORY OF PRESENT ILLNESS:   Royce Briggs is a 78 y.o. male who is being seen today for second opinion on treating noninvasive papillary urothelial carcinoma, high grade.    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    Hematospermia     Hematuria 07/01/2024    History of backache 01/14/2014    History of depression 01/14/2014    History of esophageal ulcer 01/14/2014    History of mumps 01/14/2014    History of rectal bleeding     History of rubella 01/14/2014    HLD (hyperlipidemia)     HTN (hypertension)     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 Pathology showed urothelial mucosa and muscularis propria with changes consistent with previous procedure and associated reparative atypia. Initial path showed Noninvasive papillary urothelial carcinoma, high grade.  - Currently undergoing BCG treatments with Dr. Bowling    PAST SURGICAL HISTORY:  Past Surgical History:   Procedure Laterality Date    CARDIAC CATHETERIZATION  04/15/2014    Cardiac Cath Procedure Outcome:    COLONOSCOPY W/ POLYPECTOMY  06/27/2024    HERNIA REPAIR  04/15/2014    Inguinal Hernia Repair        ALLERGIES:   No Known Allergies     MEDICATIONS:   Current Outpatient Medications  "  Medication Instructions    cholecalciferol (VITAMIN D-3) 5,000 Units, oral, Daily    lisinopril 2.5 mg, oral, Daily    m-vit/folic acid/D3/K2/qovu781 (STAGES MEN'S MULTI-VITAMIN ORAL) Every 24 hours    metoprolol succinate XL (TOPROL-XL) 50 mg, oral, Daily    nystatin (Mycostatin) 100,000 unit/gram powder 1 Application, Topical, 2 times daily    rosuvastatin (CRESTOR) 5 mg, oral, Daily    Synthroid 112 mcg, oral, Daily before breakfast    tamsulosin (FLOMAX) 0.4 mg, oral, Daily    ubidecarenone (COENZYME Q10, BULK, MISC) 200 mg, oral    warfarin (COUMADIN) 5 mg, oral, 4 days a week    warfarin (COUMADIN) 2.5 mg, oral, Take with 5 mg tab 3 days a week      PHYSICAL EXAM:  There were no vitals taken for this visit.  Constitutional: Patient appears well-developed and well-nourished. No distress.    Pulmonary/Chest: Effort normal. No respiratory distress.   Abdominal: Soft, ND NT  : WNL  Musculoskeletal: Normal range of motion.    Neurological: Alert and oriented to person, place, and time.  Psychiatric: Normal mood and affect. Behavior is normal. Thought content normal.      Labs:  No results found for: \"TESTOSTERONE\"  No results found for: \"PSA\"  No components found for: \"CBC\"  Lab Results   Component Value Date    CREATININE 1.01 07/02/2024     No components found for: \"TESTOTMS\"  No results found for: \"TESTF\"    Discussion:  Doing well, no complaints. Pt is here today for second opinion on dx of noninvasive papillary urothelial carcinoma, high grade from TURBT on 8/16/24 by Dr. Bowling. Discussed with the patient that Dr. Manzano course of treatment is appropriate with BCG treatments along with risks, benefits and alternatives. The patient is already scheduled for BCG treatments and I recommended for the patient to continue with this. I also recommended for him to continue with cystoscopic surveillance.     Assessment:    No diagnosis found.       Plan:   I reassured the patient that the plan for treatment " with BCG and cystoscopic surveillance are appropriate treatments for noninvasive papillary urothelial carcinoma, high grade.  He will continue to follow with Dr. Bowling for treatment at this time.  All questions and concerns were addressed. Patient verbalizes understanding and has no other questions at this time.    Scribe Attestation  By signing my name below, I, Swetha Schmidt   attest that this documentation has been prepared under the direction and in the presence of Joel Griffin MD.

## 2024-09-16 ENCOUNTER — OFFICE VISIT (OUTPATIENT)
Dept: UROLOGY | Facility: HOSPITAL | Age: 79
End: 2024-09-16
Payer: MEDICARE

## 2024-09-16 DIAGNOSIS — D49.4 BLADDER TUMOR: Primary | ICD-10-CM

## 2024-09-16 PROCEDURE — 99214 OFFICE O/P EST MOD 30 MIN: CPT | Performed by: UROLOGY

## 2024-09-16 PROCEDURE — 1126F AMNT PAIN NOTED NONE PRSNT: CPT | Performed by: UROLOGY

## 2024-09-16 PROCEDURE — 1159F MED LIST DOCD IN RCRD: CPT | Performed by: UROLOGY

## 2024-09-16 PROCEDURE — G2211 COMPLEX E/M VISIT ADD ON: HCPCS | Performed by: UROLOGY

## 2024-09-16 ASSESSMENT — PAIN SCALES - GENERAL: PAINLEVEL: 0-NO PAIN

## 2024-09-25 ENCOUNTER — APPOINTMENT (OUTPATIENT)
Dept: UROLOGY | Facility: CLINIC | Age: 79
End: 2024-09-25
Payer: MEDICARE

## 2024-09-25 DIAGNOSIS — C67.9 MALIGNANT NEOPLASM OF URINARY BLADDER, UNSPECIFIED SITE (MULTI): ICD-10-CM

## 2024-09-25 LAB
POC APPEARANCE, URINE: CLEAR
POC BILIRUBIN, URINE: NEGATIVE
POC BLOOD, URINE: ABNORMAL
POC COLOR, URINE: YELLOW
POC GLUCOSE, URINE: NEGATIVE MG/DL
POC KETONES, URINE: NEGATIVE MG/DL
POC LEUKOCYTES, URINE: ABNORMAL
POC NITRITE,URINE: NEGATIVE
POC PH, URINE: 7 PH
POC PROTEIN, URINE: NEGATIVE MG/DL
POC SPECIFIC GRAVITY, URINE: 1.01
POC UROBILINOGEN, URINE: 0.2 EU/DL

## 2024-09-25 PROCEDURE — 81003 URINALYSIS AUTO W/O SCOPE: CPT | Performed by: NURSE PRACTITIONER

## 2024-09-25 PROCEDURE — 51720 TREATMENT OF BLADDER LESION: CPT | Performed by: NURSE PRACTITIONER

## 2024-09-25 NOTE — PROGRESS NOTES
Pt here for BCG 1/6 induction. Informed consent signed obtained prior to treatment. Pt. Denies signs or symptoms of UTI. 14 Fr coude catheter inserted without difficulty using sterile technique. Urine drained clear yellow 75 ml. Pt tolerated well. Pt. Instructed to hold BCG for 2 hours. Pt. Educated on home going instructions. Understanding verbalized. Information sheets provided. Pt will call with any questions or concerns.

## 2024-10-02 ENCOUNTER — APPOINTMENT (OUTPATIENT)
Dept: UROLOGY | Facility: CLINIC | Age: 79
End: 2024-10-02
Payer: MEDICARE

## 2024-10-02 VITALS — WEIGHT: 188.4 LBS | BODY MASS INDEX: 27.91 KG/M2 | HEIGHT: 69 IN | TEMPERATURE: 96.4 F

## 2024-10-02 DIAGNOSIS — C67.9 MALIGNANT NEOPLASM OF URINARY BLADDER, UNSPECIFIED SITE (MULTI): Primary | ICD-10-CM

## 2024-10-02 LAB
POC APPEARANCE, URINE: CLEAR
POC BILIRUBIN, URINE: NEGATIVE
POC BLOOD, URINE: ABNORMAL
POC COLOR, URINE: YELLOW
POC GLUCOSE, URINE: NEGATIVE MG/DL
POC KETONES, URINE: NEGATIVE MG/DL
POC LEUKOCYTES, URINE: ABNORMAL
POC NITRITE,URINE: NEGATIVE
POC PH, URINE: 8.5 PH
POC PROTEIN, URINE: ABNORMAL MG/DL
POC SPECIFIC GRAVITY, URINE: 1.02
POC UROBILINOGEN, URINE: 1 EU/DL

## 2024-10-02 PROCEDURE — 51720 TREATMENT OF BLADDER LESION: CPT | Performed by: PHYSICIAN ASSISTANT

## 2024-10-02 PROCEDURE — 81002 URINALYSIS NONAUTO W/O SCOPE: CPT | Performed by: PHYSICIAN ASSISTANT

## 2024-10-02 ASSESSMENT — PAIN SCALES - GENERAL: PAINLEVEL: 0-NO PAIN

## 2024-10-03 NOTE — PROGRESS NOTES
Pt here for BCG 2/6 induction. Informed consent signed obtained prior to treatment. Pt. Denies signs or symptoms of UTI. 14 Fr coude catheter inserted without difficulty using sterile technique. Urine drained clear yellow 50 ml. Pt tolerated well. Pt. Instructed to hold BCG for 2 hours. Pt. Educated on home going instructions. Understanding verbalized. Information sheets provided. Pt will call with any questions or concerns.      NS  NDC: 6941-1153-49  Lot: [id number]  Expires: 5/1/25

## 2024-10-08 DIAGNOSIS — D49.4 BLADDER TUMOR: ICD-10-CM

## 2024-10-08 RX ORDER — TAMSULOSIN HYDROCHLORIDE 0.4 MG/1
0.4 CAPSULE ORAL DAILY
Qty: 90 CAPSULE | Refills: 3 | Status: SHIPPED | OUTPATIENT
Start: 2024-10-08 | End: 2024-10-11

## 2024-10-09 ENCOUNTER — APPOINTMENT (OUTPATIENT)
Dept: UROLOGY | Facility: CLINIC | Age: 79
End: 2024-10-09
Payer: MEDICARE

## 2024-10-09 VITALS — TEMPERATURE: 96.3 F | HEIGHT: 69 IN | BODY MASS INDEX: 26.66 KG/M2 | WEIGHT: 180 LBS

## 2024-10-09 DIAGNOSIS — C67.9 MALIGNANT NEOPLASM OF URINARY BLADDER, UNSPECIFIED SITE (MULTI): Primary | ICD-10-CM

## 2024-10-09 LAB
POC APPEARANCE, URINE: CLEAR
POC BILIRUBIN, URINE: NEGATIVE
POC BLOOD, URINE: ABNORMAL
POC COLOR, URINE: YELLOW
POC GLUCOSE, URINE: NEGATIVE MG/DL
POC KETONES, URINE: NEGATIVE MG/DL
POC LEUKOCYTES, URINE: ABNORMAL
POC NITRITE,URINE: NEGATIVE
POC PH, URINE: 6 PH
POC PROTEIN, URINE: NEGATIVE MG/DL
POC SPECIFIC GRAVITY, URINE: 1.01
POC UROBILINOGEN, URINE: 0.2 EU/DL

## 2024-10-09 PROCEDURE — 51720 TREATMENT OF BLADDER LESION: CPT | Performed by: PHYSICIAN ASSISTANT

## 2024-10-09 PROCEDURE — 81002 URINALYSIS NONAUTO W/O SCOPE: CPT | Performed by: PHYSICIAN ASSISTANT

## 2024-10-09 ASSESSMENT — PAIN SCALES - GENERAL: PAINLEVEL: 0-NO PAIN

## 2024-10-10 DIAGNOSIS — D49.4 BLADDER TUMOR: ICD-10-CM

## 2024-10-10 NOTE — PROGRESS NOTES
Pt here for BCG 3/6 induction. Informed consent signed obtained prior to treatment. Pt. Denies signs or symptoms of UTI. 14 Fr coude catheter inserted without difficulty using sterile technique. Urine drained clear yellow 50 ml. Pt tolerated well. Pt. Instructed to hold BCG for 2 hours. Pt. Educated on home going instructions. Understanding verbalized. Information sheets provided. Pt will call with any questions or concerns.    NS  NDC: 5775-8565-07  Lot: [id number]  Expires: 5/1/25

## 2024-10-11 RX ORDER — TAMSULOSIN HYDROCHLORIDE 0.4 MG/1
0.4 CAPSULE ORAL DAILY
Qty: 90 CAPSULE | Refills: 3 | Status: SHIPPED | OUTPATIENT
Start: 2024-10-11

## 2024-10-14 DIAGNOSIS — D49.4 BLADDER TUMOR: ICD-10-CM

## 2024-10-14 RX ORDER — TAMSULOSIN HYDROCHLORIDE 0.4 MG/1
0.8 CAPSULE ORAL DAILY
Qty: 180 CAPSULE | Refills: 3 | Status: SHIPPED | OUTPATIENT
Start: 2024-10-14 | End: 2025-10-14

## 2024-10-16 ENCOUNTER — APPOINTMENT (OUTPATIENT)
Dept: UROLOGY | Facility: CLINIC | Age: 79
End: 2024-10-16
Payer: MEDICARE

## 2024-10-16 VITALS — HEIGHT: 69 IN | BODY MASS INDEX: 28.29 KG/M2 | WEIGHT: 191 LBS

## 2024-10-16 DIAGNOSIS — C67.9 MALIGNANT NEOPLASM OF URINARY BLADDER, UNSPECIFIED SITE (MULTI): Primary | ICD-10-CM

## 2024-10-16 PROCEDURE — 51720 TREATMENT OF BLADDER LESION: CPT | Performed by: PHYSICIAN ASSISTANT

## 2024-10-16 PROCEDURE — 81003 URINALYSIS AUTO W/O SCOPE: CPT | Performed by: PHYSICIAN ASSISTANT

## 2024-10-16 ASSESSMENT — PAIN SCALES - GENERAL: PAINLEVEL_OUTOF10: 0-NO PAIN

## 2024-10-16 NOTE — PROGRESS NOTES
Pt here for BCG 4/6 induction. Informed consent signed obtained prior to treatment. Pt. Denies signs or symptoms of UTI. 14 Fr coude catheter inserted without difficulty using sterile technique. Urine drained clear yellow 50 ml. Pt tolerated well. Pt. Instructed to hold BCG for 2 hours. Pt. Educated on home going instructions. Understanding verbalized. Information sheets provided. Pt will call with any questions or concerns.    NS  NDC: 4631-5200-52  Lot: [id number]  Expires: 5/1/25

## 2024-10-23 ENCOUNTER — APPOINTMENT (OUTPATIENT)
Dept: UROLOGY | Facility: CLINIC | Age: 79
End: 2024-10-23
Payer: MEDICARE

## 2024-10-23 VITALS — BODY MASS INDEX: 26.66 KG/M2 | TEMPERATURE: 96.5 F | HEIGHT: 69 IN | WEIGHT: 180 LBS

## 2024-10-23 DIAGNOSIS — C67.9 MALIGNANT NEOPLASM OF URINARY BLADDER, UNSPECIFIED SITE (MULTI): Primary | ICD-10-CM

## 2024-10-23 PROCEDURE — 51720 TREATMENT OF BLADDER LESION: CPT | Performed by: PHYSICIAN ASSISTANT

## 2024-10-23 PROCEDURE — 81002 URINALYSIS NONAUTO W/O SCOPE: CPT | Performed by: PHYSICIAN ASSISTANT

## 2024-10-23 ASSESSMENT — PAIN SCALES - GENERAL: PAINLEVEL_OUTOF10: 0-NO PAIN

## 2024-10-23 NOTE — PROGRESS NOTES
Pt here for BCG 5/6 induction. Informed consent signed obtained prior to treatment. Pt. Denies signs or symptoms of UTI. 14 Fr coude catheter inserted without difficulty using sterile technique. Urine drained clear yellow 50 ml. Pt tolerated well. Pt. Instructed to hold BCG for 2 hours. Pt. Educated on home going instructions. Understanding verbalized. Information sheets provided. Pt will call with any questions or concerns.    NS  NDC: 4994-3100-77  Lot: [id number]  Expires: 5/1/25

## 2024-10-30 ENCOUNTER — APPOINTMENT (OUTPATIENT)
Dept: UROLOGY | Facility: CLINIC | Age: 79
End: 2024-10-30
Payer: MEDICARE

## 2024-10-30 VITALS — HEIGHT: 69 IN | TEMPERATURE: 96.8 F | BODY MASS INDEX: 26.66 KG/M2 | WEIGHT: 180 LBS

## 2024-10-30 DIAGNOSIS — C67.9 MALIGNANT NEOPLASM OF URINARY BLADDER, UNSPECIFIED SITE (MULTI): Primary | ICD-10-CM

## 2024-10-30 LAB
POC APPEARANCE, URINE: CLEAR
POC BILIRUBIN, URINE: NEGATIVE
POC BLOOD, URINE: ABNORMAL
POC COLOR, URINE: YELLOW
POC GLUCOSE, URINE: NEGATIVE MG/DL
POC KETONES, URINE: NEGATIVE MG/DL
POC LEUKOCYTES, URINE: ABNORMAL
POC NITRITE,URINE: NEGATIVE
POC PH, URINE: 6.5 PH
POC PROTEIN, URINE: NEGATIVE MG/DL
POC SPECIFIC GRAVITY, URINE: 1.01
POC UROBILINOGEN, URINE: 0.2 EU/DL

## 2024-10-30 PROCEDURE — 81002 URINALYSIS NONAUTO W/O SCOPE: CPT | Performed by: PHYSICIAN ASSISTANT

## 2024-10-30 PROCEDURE — 51720 TREATMENT OF BLADDER LESION: CPT | Performed by: PHYSICIAN ASSISTANT

## 2024-10-30 ASSESSMENT — PAIN SCALES - GENERAL: PAINLEVEL_OUTOF10: 0-NO PAIN

## 2024-12-04 ENCOUNTER — APPOINTMENT (OUTPATIENT)
Dept: UROLOGY | Facility: CLINIC | Age: 79
End: 2024-12-04
Payer: MEDICARE

## 2024-12-13 ENCOUNTER — APPOINTMENT (OUTPATIENT)
Dept: UROLOGY | Facility: CLINIC | Age: 79
End: 2024-12-13
Payer: MEDICARE

## 2024-12-26 DIAGNOSIS — B37.2 INTERTRIGINOUS CANDIDIASIS: ICD-10-CM

## 2024-12-29 NOTE — TELEPHONE ENCOUNTER
LOV:   7/19/24           NEXT OV:    Not yet scheduled                        LAST FILL:   7/19/24 with 1 refill                        LABS:

## 2024-12-30 RX ORDER — NYSTATIN TOPICAL POWDER 100000 U/G
POWDER TOPICAL 2 TIMES DAILY
Qty: 60 G | Refills: 1 | Status: SHIPPED | OUTPATIENT
Start: 2024-12-30

## 2025-01-07 DIAGNOSIS — D49.4 BLADDER TUMOR: ICD-10-CM

## 2025-01-07 NOTE — TELEPHONE ENCOUNTER
Pt called requesting refill tamsulosin in which Dr. Bowling prescribed.  Confirmed with pharmacy that patient has 2 refills left.  Patient advised.

## 2025-01-10 DIAGNOSIS — I10 PRIMARY HYPERTENSION: ICD-10-CM

## 2025-01-17 RX ORDER — METOPROLOL SUCCINATE 50 MG/1
50 TABLET, EXTENDED RELEASE ORAL DAILY
Qty: 90 TABLET | Refills: 1 | Status: SHIPPED | OUTPATIENT
Start: 2025-01-17

## 2025-01-23 ENCOUNTER — APPOINTMENT (OUTPATIENT)
Dept: UROLOGY | Facility: CLINIC | Age: 80
End: 2025-01-23
Payer: MEDICARE

## 2025-02-12 ENCOUNTER — OFFICE VISIT (OUTPATIENT)
Dept: PRIMARY CARE | Facility: CLINIC | Age: 80
End: 2025-02-12
Payer: MEDICARE

## 2025-02-12 VITALS
WEIGHT: 191.6 LBS | HEART RATE: 82 BPM | BODY MASS INDEX: 28.29 KG/M2 | DIASTOLIC BLOOD PRESSURE: 78 MMHG | SYSTOLIC BLOOD PRESSURE: 130 MMHG

## 2025-02-12 DIAGNOSIS — I10 PRIMARY HYPERTENSION: Primary | ICD-10-CM

## 2025-02-12 DIAGNOSIS — E78.00 HYPERCHOLESTEROLEMIA: ICD-10-CM

## 2025-02-12 DIAGNOSIS — D49.4 BLADDER TUMOR: ICD-10-CM

## 2025-02-12 DIAGNOSIS — N40.0 BENIGN PROSTATIC HYPERPLASIA WITHOUT LOWER URINARY TRACT SYMPTOMS: ICD-10-CM

## 2025-02-12 DIAGNOSIS — E03.9 ACQUIRED HYPOTHYROIDISM: ICD-10-CM

## 2025-02-12 DIAGNOSIS — D64.9 ANEMIA, UNSPECIFIED TYPE: ICD-10-CM

## 2025-02-12 DIAGNOSIS — I48.0 PAROXYSMAL ATRIAL FIBRILLATION (MULTI): ICD-10-CM

## 2025-02-12 PROCEDURE — 99214 OFFICE O/P EST MOD 30 MIN: CPT | Performed by: FAMILY MEDICINE

## 2025-02-12 PROCEDURE — 1124F ACP DISCUSS-NO DSCNMKR DOCD: CPT | Performed by: FAMILY MEDICINE

## 2025-02-12 PROCEDURE — 3075F SYST BP GE 130 - 139MM HG: CPT | Performed by: FAMILY MEDICINE

## 2025-02-12 PROCEDURE — 3078F DIAST BP <80 MM HG: CPT | Performed by: FAMILY MEDICINE

## 2025-02-12 PROCEDURE — 1036F TOBACCO NON-USER: CPT | Performed by: FAMILY MEDICINE

## 2025-02-12 PROCEDURE — 1126F AMNT PAIN NOTED NONE PRSNT: CPT | Performed by: FAMILY MEDICINE

## 2025-02-12 PROCEDURE — 1159F MED LIST DOCD IN RCRD: CPT | Performed by: FAMILY MEDICINE

## 2025-02-12 RX ORDER — TAMSULOSIN HYDROCHLORIDE 0.4 MG/1
0.8 CAPSULE ORAL DAILY
Qty: 180 CAPSULE | Refills: 1 | Status: SHIPPED | OUTPATIENT
Start: 2025-02-12 | End: 2026-02-12

## 2025-02-12 ASSESSMENT — ENCOUNTER SYMPTOMS
ACTIVITY CHANGE: 0
DIZZINESS: 0
SHORTNESS OF BREATH: 0
CHEST TIGHTNESS: 0
HEADACHES: 0
COUGH: 0
PALPITATIONS: 0

## 2025-02-12 ASSESSMENT — PATIENT HEALTH QUESTIONNAIRE - PHQ9
SUM OF ALL RESPONSES TO PHQ9 QUESTIONS 1 AND 2: 0
2. FEELING DOWN, DEPRESSED OR HOPELESS: NOT AT ALL
1. LITTLE INTEREST OR PLEASURE IN DOING THINGS: NOT AT ALL

## 2025-02-12 ASSESSMENT — PAIN SCALES - GENERAL: PAINLEVEL_OUTOF10: 0-NO PAIN

## 2025-02-12 NOTE — PROGRESS NOTES
Subjective   Patient ID: Demarcus Briggs is a 79 y.o. male who presents for Hypertension and INR.    HPI   He presents today in regular follow-up.  Blood pressure good today.  INR is low at 1.9.  States he took some [employer] yesterday stuffy nose and was not sure if he should take his Coumadin so he skipped it.  Has not had the INR checked in a while.  Does continue to follow with Dr. Marks and also has a new urologist.  He states he fired Dr. Garcia and has not had his bladder reassessed yet but does have a follow-up with his partner.  He is taking Flomax 2/day which controls his symptoms needs a prescription to match that dosing.  He is not have any lightheadedness with that.    Review of Systems   Constitutional:  Negative for activity change.   Respiratory:  Negative for cough, chest tightness and shortness of breath.    Cardiovascular:  Negative for chest pain, palpitations and leg swelling.   Neurological:  Negative for dizziness and headaches.       Objective   /78   Pulse 82   Wt 86.9 kg (191 lb 9.6 oz)   BMI 28.29 kg/m²     Physical Exam  Constitutional:       Appearance: Normal appearance.   Neck:      Thyroid: No thyromegaly or thyroid tenderness.      Vascular: No carotid bruit.   Cardiovascular:      Rate and Rhythm: Normal rate and regular rhythm.      Heart sounds: No murmur heard.  Pulmonary:      Effort: Pulmonary effort is normal.      Breath sounds: Normal breath sounds.   Musculoskeletal:      Cervical back: Neck supple.   Neurological:      Mental Status: He is alert.   Psychiatric:         Mood and Affect: Mood normal.         Assessment/Plan   Diagnoses and all orders for this visit:  Primary hypertension  Paroxysmal atrial fibrillation (Multi)  Acquired hypothyroidism  Hypercholesterolemia  Benign prostatic hyperplasia without lower urinary tract symptoms  Overall, doing well.  Will continue his current medications.  He will come back next week to have his INR rechecked once he  is taking it regularly again.  He will follow-up with me in 6 months does see Dr. Marks regularly so he can follow-up with me once a year.

## 2025-02-19 ENCOUNTER — CLINICAL SUPPORT (OUTPATIENT)
Dept: PRIMARY CARE | Facility: CLINIC | Age: 80
End: 2025-02-19
Payer: MEDICARE

## 2025-02-19 DIAGNOSIS — Z79.01 ENCOUNTER FOR CURRENT LONG-TERM USE OF ANTICOAGULANTS: ICD-10-CM

## 2025-02-19 DIAGNOSIS — I48.11 LONGSTANDING PERSISTENT ATRIAL FIBRILLATION (MULTI): ICD-10-CM

## 2025-02-19 LAB
ALBUMIN SERPL-MCNC: 4.3 G/DL (ref 3.6–5.1)
ALP SERPL-CCNC: 85 U/L (ref 35–144)
ALT SERPL-CCNC: 22 U/L (ref 9–46)
ANION GAP SERPL CALCULATED.4IONS-SCNC: 9 MMOL/L (CALC) (ref 7–17)
AST SERPL-CCNC: 27 U/L (ref 10–35)
BILIRUB SERPL-MCNC: 0.5 MG/DL (ref 0.2–1.2)
BUN SERPL-MCNC: 19 MG/DL (ref 7–25)
CALCIUM SERPL-MCNC: 9.2 MG/DL (ref 8.6–10.3)
CHLORIDE SERPL-SCNC: 103 MMOL/L (ref 98–110)
CHOLEST SERPL-MCNC: 215 MG/DL
CHOLEST/HDLC SERPL: 4.3 (CALC)
CO2 SERPL-SCNC: 26 MMOL/L (ref 20–32)
CREAT SERPL-MCNC: 1 MG/DL (ref 0.7–1.28)
EGFRCR SERPLBLD CKD-EPI 2021: 77 ML/MIN/1.73M2
ERYTHROCYTE [DISTWIDTH] IN BLOOD BY AUTOMATED COUNT: 13 % (ref 11–15)
GLUCOSE SERPL-MCNC: 97 MG/DL (ref 65–99)
HCT VFR BLD AUTO: 37.2 % (ref 38.5–50)
HDLC SERPL-MCNC: 50 MG/DL
HGB BLD-MCNC: 12.4 G/DL (ref 13.2–17.1)
IRON SATN MFR SERPL: 64 % (CALC) (ref 20–48)
IRON SERPL-MCNC: 175 MCG/DL (ref 50–180)
LDLC SERPL CALC-MCNC: 142 MG/DL (CALC)
MCH RBC QN AUTO: 33.2 PG (ref 27–33)
MCHC RBC AUTO-ENTMCNC: 33.3 G/DL (ref 32–36)
MCV RBC AUTO: 99.7 FL (ref 80–100)
NONHDLC SERPL-MCNC: 165 MG/DL (CALC)
PLATELET # BLD AUTO: 244 THOUSAND/UL (ref 140–400)
PMV BLD REES-ECKER: 10.1 FL (ref 7.5–12.5)
POC INR: 1.4
POC PROTHROMBIN TIME: 17
POTASSIUM SERPL-SCNC: 4.4 MMOL/L (ref 3.5–5.3)
PROT SERPL-MCNC: 6.8 G/DL (ref 6.1–8.1)
RBC # BLD AUTO: 3.73 MILLION/UL (ref 4.2–5.8)
SODIUM SERPL-SCNC: 138 MMOL/L (ref 135–146)
TIBC SERPL-MCNC: 273 MCG/DL (CALC) (ref 250–425)
TRIGL SERPL-MCNC: 110 MG/DL
TSH SERPL-ACNC: 2.34 MIU/L (ref 0.4–4.5)
VIT B12 SERPL-MCNC: 679 PG/ML (ref 200–1100)
WBC # BLD AUTO: 6.2 THOUSAND/UL (ref 3.8–10.8)

## 2025-02-19 PROCEDURE — 85610 PROTHROMBIN TIME: CPT | Mod: QW | Performed by: FAMILY MEDICINE

## 2025-02-19 PROCEDURE — 85610 PROTHROMBIN TIME: CPT

## 2025-02-26 ENCOUNTER — CLINICAL SUPPORT (OUTPATIENT)
Dept: PRIMARY CARE | Facility: CLINIC | Age: 80
End: 2025-02-26
Payer: MEDICARE

## 2025-02-26 DIAGNOSIS — Z79.01 ENCOUNTER FOR CURRENT LONG-TERM USE OF ANTICOAGULANTS: ICD-10-CM

## 2025-02-26 DIAGNOSIS — E03.9 HYPOTHYROIDISM, UNSPECIFIED: ICD-10-CM

## 2025-02-26 DIAGNOSIS — I48.11 LONGSTANDING PERSISTENT ATRIAL FIBRILLATION (MULTI): ICD-10-CM

## 2025-02-26 LAB
POC INR: 2.2
POC PROTHROMBIN TIME: 27

## 2025-02-26 PROCEDURE — 85610 PROTHROMBIN TIME: CPT | Mod: QW | Performed by: FAMILY MEDICINE

## 2025-02-26 PROCEDURE — 85610 PROTHROMBIN TIME: CPT

## 2025-02-26 NOTE — TELEPHONE ENCOUNTER
LOV:   2/12/25        NEXT OV:   None scheduled                         LAST FILL:                           LABS:  DONE 2/19/25-TSH NORMAL

## 2025-02-27 RX ORDER — LEVOTHYROXINE SODIUM 112 UG/1
112 TABLET ORAL
Qty: 90 TABLET | Refills: 3 | Status: SHIPPED | OUTPATIENT
Start: 2025-02-27 | End: 2026-02-22

## 2025-02-27 RX ORDER — LISINOPRIL 2.5 MG/1
2.5 TABLET ORAL DAILY
Qty: 90 TABLET | Refills: 1 | Status: SHIPPED | OUTPATIENT
Start: 2025-02-27

## 2025-03-12 ENCOUNTER — CLINICAL SUPPORT (OUTPATIENT)
Dept: PRIMARY CARE | Facility: CLINIC | Age: 80
End: 2025-03-12
Payer: MEDICARE

## 2025-03-12 DIAGNOSIS — Z79.01 ENCOUNTER FOR CURRENT LONG-TERM USE OF ANTICOAGULANTS: ICD-10-CM

## 2025-03-12 LAB
POC INR: 2.5
POC PROTHROMBIN TIME: NORMAL

## 2025-03-12 PROCEDURE — 85610 PROTHROMBIN TIME: CPT | Mod: QW | Performed by: FAMILY MEDICINE

## 2025-03-12 PROCEDURE — 85610 PROTHROMBIN TIME: CPT

## 2025-04-25 ENCOUNTER — ANTICOAGULATION - WARFARIN VISIT (OUTPATIENT)
Dept: PRIMARY CARE | Facility: CLINIC | Age: 80
End: 2025-04-25
Payer: MEDICARE

## 2025-06-04 ENCOUNTER — ANTICOAGULATION - WARFARIN VISIT (OUTPATIENT)
Dept: PRIMARY CARE | Facility: CLINIC | Age: 80
End: 2025-06-04
Payer: MEDICARE

## 2025-06-05 ENCOUNTER — OFFICE VISIT (OUTPATIENT)
Dept: UROLOGY | Facility: HOSPITAL | Age: 80
End: 2025-06-05
Payer: MEDICARE

## 2025-06-05 DIAGNOSIS — Z79.2 NEED FOR PROPHYLACTIC ANTIBIOTIC: ICD-10-CM

## 2025-06-05 DIAGNOSIS — C67.9 MALIGNANT NEOPLASM OF URINARY BLADDER, UNSPECIFIED SITE (MULTI): ICD-10-CM

## 2025-06-05 DIAGNOSIS — D49.4 BLADDER TUMOR: ICD-10-CM

## 2025-06-05 PROCEDURE — 52000 CYSTOURETHROSCOPY: CPT | Performed by: UROLOGY

## 2025-06-05 PROCEDURE — 2500000001 HC RX 250 WO HCPCS SELF ADMINISTERED DRUGS (ALT 637 FOR MEDICARE OP): Performed by: UROLOGY

## 2025-06-05 PROCEDURE — 1126F AMNT PAIN NOTED NONE PRSNT: CPT | Performed by: UROLOGY

## 2025-06-05 PROCEDURE — 1159F MED LIST DOCD IN RCRD: CPT | Performed by: UROLOGY

## 2025-06-05 RX ORDER — CIPROFLOXACIN 500 MG/1
500 TABLET, FILM COATED ORAL ONCE
Status: COMPLETED | OUTPATIENT
Start: 2025-06-05 | End: 2025-06-05

## 2025-06-05 RX ADMIN — CIPROFLOXACIN 500 MG: 500 TABLET ORAL at 13:07

## 2025-06-05 ASSESSMENT — PAIN SCALES - GENERAL: PAINLEVEL_OUTOF10: 0-NO PAIN

## 2025-06-05 NOTE — PROGRESS NOTES
[facility]     HISTORY OF PRESENT ILLNESS:   Royce Briggs is a 79 y.o. male who is being seen today for noninvasive papillary urothelial carcinoma, high grade.  -  s/p TURBT 8/16/2024. Pathology showed urothelial mucosa and muscularis propria with changes consistent with previous procedure and associated reparative atypia. Initial path showed Noninvasive papillary urothelial carcinoma, high grade.  - underwent BCG treatments with Dr. 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    Hematospermia     Hematuria 07/01/2024    History of backache 01/14/2014    History of depression 01/14/2014    History of esophageal ulcer 01/14/2014    History of mumps 01/14/2014    History of rectal bleeding     History of rubella 01/14/2014    HLD (hyperlipidemia)     HTN (hypertension)     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    Avascular necrosis of hip, left    Normal cardiac stress test 01/14/2014    On Coumadin for atrial fibrillation     Personal history of other diseases of the circulatory system     History of cardiac disorder    Rhinitis, chronic     Tick bite 04/09/2022    Ulcer 01/14/2014    Undescended testicle     Unspecified abdominal hernia without obstruction or gangrene 01/14/2014    Hernia       PAST SURGICAL HISTORY:  Past Surgical History:   Procedure Laterality Date    CARDIAC CATHETERIZATION  04/15/2014    Cardiac Cath Procedure Outcome:    COLONOSCOPY W/ POLYPECTOMY  06/27/2024    HERNIA REPAIR  04/15/2014    Inguinal Hernia Repair        ALLERGIES:   No Known Allergies     MEDICATIONS:   Current Outpatient Medications   Medication Instructions    cholecalciferol  "(VITAMIN D-3) 5,000 Units, Daily    Klayesta 100,000 unit/gram powder Topical, 2 times daily, to affected area    levothyroxine (SYNTHROID, LEVOXYL) 112 mcg, oral, Daily before breakfast    lisinopril 2.5 mg, oral, Daily    m-vit/folic acid/D3/K2/cihs185 (STAGES MEN'S MULTI-VITAMIN ORAL) Every 24 hours    metoprolol succinate XL (TOPROL-XL) 50 mg, oral, Daily    rosuvastatin (CRESTOR) 5 mg, Daily    tamsulosin (FLOMAX) 0.8 mg, oral, Daily    ubidecarenone (COENZYME Q10, BULK, MISC) 200 mg    warfarin (COUMADIN) 5 mg    warfarin (COUMADIN) 2.5 mg      PHYSICAL EXAM:  There were no vitals taken for this visit.  Constitutional: Patient appears well-developed and well-nourished. No distress.    Pulmonary/Chest: Effort normal. No respiratory distress.   Abdominal: Soft, ND NT  : WNL  Musculoskeletal: Normal range of motion.    Neurological: Alert and oriented to person, place, and time.  Psychiatric: Normal mood and affect. Behavior is normal. Thought content normal.      Labs:  No results found for: \"TESTOSTERONE\"  No results found for: \"PSA\"  No components found for: \"CBC\"  Lab Results   Component Value Date    CREATININE 1.00 02/19/2025     No components found for: \"TESTOTMS\"  No results found for: \"TESTF\"    Discussion:  Doing well, no complaints. Denies hematuria, dysuria, nocturia, flank pain, and bothersome frequency or urgency. Will need cystoscopy today for dx of noninvasive papillary urothelial carcinoma, high grade from TURBT on 8/16/24 by Dr. Bowling. The cystoscopy was completed today due to noninvasive papillary urothelial carcinoma and demonstrated no tumors, stones or lesions. Cytology sent. 1 abx given to patient in clinic today. Patient instructed to follow up in 3 months for continued surveillance.      Cystoscopic schedule:  Q3 until 8/2027, Q6 until 8/2029 and yearly if no recurrences by year 5  Assessment:      1. Malignant neoplasm of urinary bladder, unspecified site (Multi)  ciprofloxacin " (Cipro) tablet 500 mg      2. Bladder tumor  ciprofloxacin (Cipro) tablet 500 mg      3. Need for prophylactic antibiotic  ciprofloxacin (Cipro) tablet 500 mg             Plan:   Follow up in 3 months for continued cystoscopic surveillance.   All questions and concerns were addressed. Patient verbalizes understanding and has no other questions at this time.    Scribe Attestation  By signing my name below, IImelda, Scribe attest that this documentation has been prepared under the direction and in the presence of Joel Griffin MD.

## 2025-07-10 DIAGNOSIS — I10 PRIMARY HYPERTENSION: ICD-10-CM

## 2025-07-10 NOTE — TELEPHONE ENCOUNTER
Last OV:   2/12/25  (pt no show on 7/2)  Next OV:   none scheduled - [facility]  Last Refill:     Last Labs:

## 2025-07-15 NOTE — TELEPHONE ENCOUNTER
Spoke with pt.  He apologized for missing his appt.  He has several appointments scheduled as he is having eye surgery on both eyes.  He will call back and schedule for end of September.

## 2025-07-17 RX ORDER — METOPROLOL SUCCINATE 50 MG/1
50 TABLET, EXTENDED RELEASE ORAL DAILY
Qty: 90 TABLET | Refills: 1 | Status: SHIPPED | OUTPATIENT
Start: 2025-07-17

## 2025-07-30 ENCOUNTER — APPOINTMENT (OUTPATIENT)
Dept: CARDIOLOGY | Facility: HOSPITAL | Age: 80
End: 2025-07-30
Payer: MEDICARE

## 2025-07-30 ENCOUNTER — HOSPITAL ENCOUNTER (INPATIENT)
Facility: HOSPITAL | Age: 80
LOS: 3 days | Discharge: HOME | End: 2025-08-02
Attending: FAMILY MEDICINE | Admitting: INTERNAL MEDICINE
Payer: MEDICARE

## 2025-07-30 DIAGNOSIS — I48.20 ATRIAL FIBRILLATION, CHRONIC (MULTI): ICD-10-CM

## 2025-07-30 DIAGNOSIS — I48.91 ATRIAL FIBRILLATION (MULTI): Primary | ICD-10-CM

## 2025-07-30 DIAGNOSIS — I35.0 AORTIC STENOSIS: ICD-10-CM

## 2025-07-30 DIAGNOSIS — I48.91 ATRIAL FIBRILLATION WITH RAPID VENTRICULAR RESPONSE (MULTI): ICD-10-CM

## 2025-07-30 LAB
ALBUMIN SERPL BCP-MCNC: 4.6 G/DL (ref 3.4–5)
ALP SERPL-CCNC: 92 U/L (ref 33–136)
ALT SERPL W P-5'-P-CCNC: 16 U/L (ref 10–52)
ANION GAP SERPL CALC-SCNC: 14 MMOL/L (ref 10–20)
AST SERPL W P-5'-P-CCNC: 24 U/L (ref 9–39)
ATRIAL RATE: 101 BPM
BASOPHILS # BLD AUTO: 0.06 X10*3/UL (ref 0–0.1)
BASOPHILS NFR BLD AUTO: 1 %
BILIRUB SERPL-MCNC: 0.7 MG/DL (ref 0–1.2)
BNP SERPL-MCNC: 117 PG/ML (ref 0–99)
BUN SERPL-MCNC: 23 MG/DL (ref 6–23)
CALCIUM SERPL-MCNC: 9.4 MG/DL (ref 8.6–10.3)
CARDIAC TROPONIN I PNL SERPL HS: 37 NG/L (ref 0–20)
CARDIAC TROPONIN I PNL SERPL HS: 60 NG/L (ref 0–20)
CHLORIDE SERPL-SCNC: 101 MMOL/L (ref 98–107)
CO2 SERPL-SCNC: 26 MMOL/L (ref 21–32)
CREAT SERPL-MCNC: 1.01 MG/DL (ref 0.5–1.3)
EGFRCR SERPLBLD CKD-EPI 2021: 76 ML/MIN/1.73M*2
EOSINOPHIL # BLD AUTO: 0.51 X10*3/UL (ref 0–0.4)
EOSINOPHIL NFR BLD AUTO: 8.5 %
ERYTHROCYTE [DISTWIDTH] IN BLOOD BY AUTOMATED COUNT: 13 % (ref 11.5–14.5)
GLUCOSE SERPL-MCNC: 106 MG/DL (ref 74–99)
HCT VFR BLD AUTO: 39.5 % (ref 41–52)
HGB BLD-MCNC: 13.6 G/DL (ref 13.5–17.5)
IMM GRANULOCYTES # BLD AUTO: 0.01 X10*3/UL (ref 0–0.5)
IMM GRANULOCYTES NFR BLD AUTO: 0.2 % (ref 0–0.9)
INR PPP: 1.9 (ref 0.9–1.1)
LACTATE SERPL-SCNC: 1 MMOL/L (ref 0.4–2)
LYMPHOCYTES # BLD AUTO: 1.58 X10*3/UL (ref 0.8–3)
LYMPHOCYTES NFR BLD AUTO: 26.4 %
MCH RBC QN AUTO: 33.6 PG (ref 26–34)
MCHC RBC AUTO-ENTMCNC: 34.4 G/DL (ref 32–36)
MCV RBC AUTO: 98 FL (ref 80–100)
MONOCYTES # BLD AUTO: 0.68 X10*3/UL (ref 0.05–0.8)
MONOCYTES NFR BLD AUTO: 11.4 %
NEUTROPHILS # BLD AUTO: 3.14 X10*3/UL (ref 1.6–5.5)
NEUTROPHILS NFR BLD AUTO: 52.5 %
NRBC BLD-RTO: 0 /100 WBCS (ref 0–0)
PLATELET # BLD AUTO: 207 X10*3/UL (ref 150–450)
POTASSIUM SERPL-SCNC: 3.9 MMOL/L (ref 3.5–5.3)
PROT SERPL-MCNC: 7.8 G/DL (ref 6.4–8.2)
PROTHROMBIN TIME: 20.9 SECONDS (ref 9.8–12.4)
Q ONSET: 223 MS
Q ONSET: 224 MS
QRS COUNT: 21 BEATS
QRS COUNT: 23 BEATS
QRS DURATION: 92 MS
QRS DURATION: 94 MS
QT INTERVAL: 298 MS
QT INTERVAL: 306 MS
QTC CALCULATION(BAZETT): 450 MS
QTC CALCULATION(BAZETT): 456 MS
QTC FREDERICIA: 396 MS
QTC FREDERICIA: 396 MS
R AXIS: -31 DEGREES
R AXIS: -32 DEGREES
RBC # BLD AUTO: 4.05 X10*6/UL (ref 4.5–5.9)
SODIUM SERPL-SCNC: 137 MMOL/L (ref 136–145)
T AXIS: 83 DEGREES
T AXIS: 84 DEGREES
T OFFSET: 373 MS
T OFFSET: 376 MS
T4 FREE SERPL-MCNC: 1.1 NG/DL (ref 0.61–1.12)
TSH SERPL-ACNC: 2.15 MIU/L (ref 0.44–3.98)
VENTRICULAR RATE: 130 BPM
VENTRICULAR RATE: 141 BPM
WBC # BLD AUTO: 6 X10*3/UL (ref 4.4–11.3)

## 2025-07-30 PROCEDURE — 2020000001 HC ICU ROOM DAILY: Mod: IPSPLIT

## 2025-07-30 PROCEDURE — 83036 HEMOGLOBIN GLYCOSYLATED A1C: CPT | Mod: GENLAB | Performed by: SURGERY

## 2025-07-30 PROCEDURE — 2500000002 HC RX 250 W HCPCS SELF ADMINISTERED DRUGS (ALT 637 FOR MEDICARE OP, ALT 636 FOR OP/ED): Mod: IPSPLIT | Performed by: STUDENT IN AN ORGANIZED HEALTH CARE EDUCATION/TRAINING PROGRAM

## 2025-07-30 PROCEDURE — 84484 ASSAY OF TROPONIN QUANT: CPT | Performed by: FAMILY MEDICINE

## 2025-07-30 PROCEDURE — 83605 ASSAY OF LACTIC ACID: CPT | Performed by: FAMILY MEDICINE

## 2025-07-30 PROCEDURE — 96361 HYDRATE IV INFUSION ADD-ON: CPT

## 2025-07-30 PROCEDURE — 99285 EMERGENCY DEPT VISIT HI MDM: CPT | Performed by: FAMILY MEDICINE

## 2025-07-30 PROCEDURE — 96365 THER/PROPH/DIAG IV INF INIT: CPT

## 2025-07-30 PROCEDURE — 83880 ASSAY OF NATRIURETIC PEPTIDE: CPT | Performed by: FAMILY MEDICINE

## 2025-07-30 PROCEDURE — 2500000004 HC RX 250 GENERAL PHARMACY W/ HCPCS (ALT 636 FOR OP/ED): Performed by: FAMILY MEDICINE

## 2025-07-30 PROCEDURE — 85025 COMPLETE CBC W/AUTO DIFF WBC: CPT | Performed by: FAMILY MEDICINE

## 2025-07-30 PROCEDURE — 93005 ELECTROCARDIOGRAM TRACING: CPT

## 2025-07-30 PROCEDURE — 36415 COLL VENOUS BLD VENIPUNCTURE: CPT | Performed by: FAMILY MEDICINE

## 2025-07-30 PROCEDURE — 84439 ASSAY OF FREE THYROXINE: CPT

## 2025-07-30 PROCEDURE — 96375 TX/PRO/DX INJ NEW DRUG ADDON: CPT

## 2025-07-30 PROCEDURE — 2500000001 HC RX 250 WO HCPCS SELF ADMINISTERED DRUGS (ALT 637 FOR MEDICARE OP): Mod: IPSPLIT | Performed by: STUDENT IN AN ORGANIZED HEALTH CARE EDUCATION/TRAINING PROGRAM

## 2025-07-30 PROCEDURE — 96366 THER/PROPH/DIAG IV INF ADDON: CPT

## 2025-07-30 PROCEDURE — 84443 ASSAY THYROID STIM HORMONE: CPT

## 2025-07-30 PROCEDURE — 94760 N-INVAS EAR/PLS OXIMETRY 1: CPT | Mod: IPSPLIT

## 2025-07-30 PROCEDURE — 85610 PROTHROMBIN TIME: CPT | Performed by: FAMILY MEDICINE

## 2025-07-30 PROCEDURE — 2500000004 HC RX 250 GENERAL PHARMACY W/ HCPCS (ALT 636 FOR OP/ED)

## 2025-07-30 PROCEDURE — 84075 ASSAY ALKALINE PHOSPHATASE: CPT | Performed by: FAMILY MEDICINE

## 2025-07-30 PROCEDURE — 2500000004 HC RX 250 GENERAL PHARMACY W/ HCPCS (ALT 636 FOR OP/ED): Mod: IPSPLIT

## 2025-07-30 PROCEDURE — 99291 CRITICAL CARE FIRST HOUR: CPT | Mod: 25 | Performed by: FAMILY MEDICINE

## 2025-07-30 RX ORDER — TALC
9 POWDER (GRAM) TOPICAL NIGHTLY PRN
Status: DISCONTINUED | OUTPATIENT
Start: 2025-07-30 | End: 2025-08-02 | Stop reason: HOSPADM

## 2025-07-30 RX ORDER — ESOMEPRAZOLE MAGNESIUM 20 MG/1
40 GRANULE, DELAYED RELEASE ORAL
Status: DISCONTINUED | OUTPATIENT
Start: 2025-07-31 | End: 2025-07-31

## 2025-07-30 RX ORDER — LEVOTHYROXINE SODIUM 100 UG/1
100 TABLET ORAL DAILY
Status: DISCONTINUED | OUTPATIENT
Start: 2025-07-31 | End: 2025-08-02 | Stop reason: HOSPADM

## 2025-07-30 RX ORDER — METOPROLOL SUCCINATE 25 MG/1
50 TABLET, EXTENDED RELEASE ORAL NIGHTLY
Status: DISCONTINUED | OUTPATIENT
Start: 2025-07-30 | End: 2025-08-02

## 2025-07-30 RX ORDER — ONDANSETRON HYDROCHLORIDE 2 MG/ML
4 INJECTION, SOLUTION INTRAVENOUS EVERY 4 HOURS PRN
Status: DISCONTINUED | OUTPATIENT
Start: 2025-07-30 | End: 2025-08-02 | Stop reason: HOSPADM

## 2025-07-30 RX ORDER — PANTOPRAZOLE SODIUM 40 MG/10ML
40 INJECTION, POWDER, LYOPHILIZED, FOR SOLUTION INTRAVENOUS
Status: DISCONTINUED | OUTPATIENT
Start: 2025-07-31 | End: 2025-07-31

## 2025-07-30 RX ORDER — ACETAMINOPHEN 325 MG/1
650 TABLET ORAL EVERY 6 HOURS PRN
Status: DISCONTINUED | OUTPATIENT
Start: 2025-07-30 | End: 2025-08-02 | Stop reason: HOSPADM

## 2025-07-30 RX ORDER — ROSUVASTATIN CALCIUM 10 MG/1
5 TABLET, COATED ORAL NIGHTLY
Status: DISCONTINUED | OUTPATIENT
Start: 2025-07-30 | End: 2025-07-31

## 2025-07-30 RX ORDER — TAMSULOSIN HYDROCHLORIDE 0.4 MG/1
0.8 CAPSULE ORAL NIGHTLY
Status: DISCONTINUED | OUTPATIENT
Start: 2025-07-30 | End: 2025-08-02 | Stop reason: HOSPADM

## 2025-07-30 RX ORDER — PANTOPRAZOLE SODIUM 40 MG/1
40 TABLET, DELAYED RELEASE ORAL
Status: DISCONTINUED | OUTPATIENT
Start: 2025-07-31 | End: 2025-08-02 | Stop reason: HOSPADM

## 2025-07-30 RX ORDER — LISINOPRIL 2.5 MG/1
2.5 TABLET ORAL DAILY
Status: DISCONTINUED | OUTPATIENT
Start: 2025-07-31 | End: 2025-07-31

## 2025-07-30 RX ORDER — METOPROLOL TARTRATE 1 MG/ML
5 INJECTION, SOLUTION INTRAVENOUS ONCE
Status: COMPLETED | OUTPATIENT
Start: 2025-07-30 | End: 2025-07-30

## 2025-07-30 RX ADMIN — AMIODARONE HYDROCHLORIDE 0.5 MG/MIN: 1.8 INJECTION, SOLUTION INTRAVENOUS at 22:47

## 2025-07-30 RX ADMIN — TAMSULOSIN HYDROCHLORIDE 0.8 MG: 0.4 CAPSULE ORAL at 23:45

## 2025-07-30 RX ADMIN — METOPROLOL SUCCINATE 50 MG: 25 TABLET, EXTENDED RELEASE ORAL at 23:45

## 2025-07-30 RX ADMIN — WARFARIN SODIUM 7.5 MG: 5 TABLET ORAL at 23:45

## 2025-07-30 RX ADMIN — AMIODARONE HYDROCHLORIDE 1 MG/MIN: 1.8 INJECTION, SOLUTION INTRAVENOUS at 16:36

## 2025-07-30 RX ADMIN — METOPROLOL TARTRATE 5 MG: 5 INJECTION INTRAVENOUS at 13:15

## 2025-07-30 RX ADMIN — SODIUM CHLORIDE 500 ML: 0.9 INJECTION, SOLUTION INTRAVENOUS at 13:12

## 2025-07-30 RX ADMIN — AMIODARONE HYDROCHLORIDE 150 MG: 1.5 INJECTION, SOLUTION INTRAVENOUS at 16:15

## 2025-07-30 SDOH — SOCIAL STABILITY: SOCIAL INSECURITY: ARE THERE ANY APPARENT SIGNS OF INJURIES/BEHAVIORS THAT COULD BE RELATED TO ABUSE/NEGLECT?: NO

## 2025-07-30 SDOH — ECONOMIC STABILITY: HOUSING INSECURITY: IN THE LAST 12 MONTHS, WAS THERE A TIME WHEN YOU WERE NOT ABLE TO PAY THE MORTGAGE OR RENT ON TIME?: NO

## 2025-07-30 SDOH — SOCIAL STABILITY: SOCIAL INSECURITY: ARE YOU OR HAVE YOU BEEN THREATENED OR ABUSED PHYSICALLY, EMOTIONALLY, OR SEXUALLY BY ANYONE?: NO

## 2025-07-30 SDOH — SOCIAL STABILITY: SOCIAL INSECURITY: ABUSE: ADULT

## 2025-07-30 SDOH — SOCIAL STABILITY: SOCIAL INSECURITY
WITHIN THE LAST YEAR, HAVE YOU BEEN RAPED OR FORCED TO HAVE ANY KIND OF SEXUAL ACTIVITY BY YOUR PARTNER OR EX-PARTNER?: NO

## 2025-07-30 SDOH — SOCIAL STABILITY: SOCIAL INSECURITY: WITHIN THE LAST YEAR, HAVE YOU BEEN HUMILIATED OR EMOTIONALLY ABUSED IN OTHER WAYS BY YOUR PARTNER OR EX-PARTNER?: NO

## 2025-07-30 SDOH — SOCIAL STABILITY: SOCIAL INSECURITY: HAS ANYONE EVER THREATENED TO HURT YOUR FAMILY OR YOUR PETS?: NO

## 2025-07-30 SDOH — ECONOMIC STABILITY: FOOD INSECURITY: WITHIN THE PAST 12 MONTHS, YOU WORRIED THAT YOUR FOOD WOULD RUN OUT BEFORE YOU GOT THE MONEY TO BUY MORE.: NEVER TRUE

## 2025-07-30 SDOH — SOCIAL STABILITY: SOCIAL INSECURITY: DO YOU FEEL UNSAFE GOING BACK TO THE PLACE WHERE YOU ARE LIVING?: NO

## 2025-07-30 SDOH — SOCIAL STABILITY: SOCIAL INSECURITY: WITHIN THE LAST YEAR, HAVE YOU BEEN AFRAID OF YOUR PARTNER OR EX-PARTNER?: NO

## 2025-07-30 SDOH — SOCIAL STABILITY: SOCIAL INSECURITY: HAVE YOU HAD ANY THOUGHTS OF HARMING ANYONE ELSE?: NO

## 2025-07-30 SDOH — ECONOMIC STABILITY: FOOD INSECURITY: WITHIN THE PAST 12 MONTHS, THE FOOD YOU BOUGHT JUST DIDN'T LAST AND YOU DIDN'T HAVE MONEY TO GET MORE.: NEVER TRUE

## 2025-07-30 SDOH — ECONOMIC STABILITY: FOOD INSECURITY: HOW HARD IS IT FOR YOU TO PAY FOR THE VERY BASICS LIKE FOOD, HOUSING, MEDICAL CARE, AND HEATING?: NOT VERY HARD

## 2025-07-30 SDOH — ECONOMIC STABILITY: INCOME INSECURITY: IN THE PAST 12 MONTHS HAS THE ELECTRIC, GAS, OIL, OR WATER COMPANY THREATENED TO SHUT OFF SERVICES IN YOUR HOME?: NO

## 2025-07-30 SDOH — SOCIAL STABILITY: SOCIAL INSECURITY
WITHIN THE LAST YEAR, HAVE YOU BEEN KICKED, HIT, SLAPPED, OR OTHERWISE PHYSICALLY HURT BY YOUR PARTNER OR EX-PARTNER?: NO

## 2025-07-30 SDOH — SOCIAL STABILITY: SOCIAL INSECURITY: DOES ANYONE TRY TO KEEP YOU FROM HAVING/CONTACTING OTHER FRIENDS OR DOING THINGS OUTSIDE YOUR HOME?: NO

## 2025-07-30 SDOH — SOCIAL STABILITY: SOCIAL INSECURITY: DO YOU FEEL ANYONE HAS EXPLOITED OR TAKEN ADVANTAGE OF YOU FINANCIALLY OR OF YOUR PERSONAL PROPERTY?: NO

## 2025-07-30 SDOH — SOCIAL STABILITY: SOCIAL INSECURITY: HAVE YOU HAD THOUGHTS OF HARMING ANYONE ELSE?: NO

## 2025-07-30 ASSESSMENT — COGNITIVE AND FUNCTIONAL STATUS - GENERAL
MOBILITY SCORE: 24
PATIENT BASELINE BEDBOUND: NO
DAILY ACTIVITIY SCORE: 24

## 2025-07-30 ASSESSMENT — PATIENT HEALTH QUESTIONNAIRE - PHQ9
SUM OF ALL RESPONSES TO PHQ9 QUESTIONS 1 & 2: 0
2. FEELING DOWN, DEPRESSED OR HOPELESS: NOT AT ALL
1. LITTLE INTEREST OR PLEASURE IN DOING THINGS: NOT AT ALL

## 2025-07-30 ASSESSMENT — ACTIVITIES OF DAILY LIVING (ADL)
GROOMING: INDEPENDENT
HEARING - RIGHT EAR: DIFFICULTY WITH NOISE
JUDGMENT_ADEQUATE_SAFELY_COMPLETE_DAILY_ACTIVITIES: YES
BATHING: INDEPENDENT
TOILETING: INDEPENDENT
PATIENT'S MEMORY ADEQUATE TO SAFELY COMPLETE DAILY ACTIVITIES?: YES
HEARING - LEFT EAR: DIFFICULTY WITH NOISE
DRESSING YOURSELF: INDEPENDENT
WALKS IN HOME: INDEPENDENT
FEEDING YOURSELF: INDEPENDENT
ADEQUATE_TO_COMPLETE_ADL: YES
LACK_OF_TRANSPORTATION: NO
ASSISTIVE_DEVICE: EYEGLASSES

## 2025-07-30 ASSESSMENT — HEART SCORE
HISTORY: HIGHLY SUSPICIOUS
AGE: 65+
ECG: SIGNIFICANT ST-DEPRESSION
HEART SCORE: 10
TROPONIN: GREATER THAN OR EQUAL TO 3 TIMES NORMAL LIMIT
RISK FACTORS: >2 RISK FACTORS OR HX OF ATHEROSCLEROTIC DISEASE

## 2025-07-30 ASSESSMENT — LIFESTYLE VARIABLES
SKIP TO QUESTIONS 9-10: 1
HOW MANY STANDARD DRINKS CONTAINING ALCOHOL DO YOU HAVE ON A TYPICAL DAY: 1 OR 2
HOW OFTEN DO YOU HAVE A DRINK CONTAINING ALCOHOL: MONTHLY OR LESS
HOW OFTEN DO YOU HAVE 6 OR MORE DRINKS ON ONE OCCASION: NEVER
AUDIT-C TOTAL SCORE: 1
AUDIT-C TOTAL SCORE: 1

## 2025-07-30 ASSESSMENT — PAIN SCALES - GENERAL
PAINLEVEL_OUTOF10: 0 - NO PAIN
PAINLEVEL_OUTOF10: 0 - NO PAIN

## 2025-07-30 ASSESSMENT — PAIN - FUNCTIONAL ASSESSMENT: PAIN_FUNCTIONAL_ASSESSMENT: 0-10
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
## 2025-07-30 NOTE — ED TRIAGE NOTES
Pt states he was outside in the hot weather and after that he felt some chest heaviness, no dizziness, no SOB. Pt fried checked his BP and his BP was elevated and HR was elevated. Pt does have hx A-fib

## 2025-07-31 ENCOUNTER — APPOINTMENT (OUTPATIENT)
Dept: CARDIOLOGY | Facility: HOSPITAL | Age: 80
End: 2025-07-31
Payer: MEDICARE

## 2025-07-31 ENCOUNTER — APPOINTMENT (OUTPATIENT)
Dept: RADIOLOGY | Facility: HOSPITAL | Age: 80
End: 2025-07-31
Payer: MEDICARE

## 2025-07-31 LAB
ABO GROUP (TYPE) IN BLOOD: NORMAL
ABO GROUP (TYPE) IN BLOOD: NORMAL
ANION GAP SERPL CALC-SCNC: 10 MMOL/L (ref 10–20)
ANTIBODY SCREEN: NORMAL
AORTIC VALVE MEAN GRADIENT: 29 MMHG
AORTIC VALVE PEAK VELOCITY: 3.74 M/S
APTT PPP: 41 SECONDS (ref 26–36)
AV PEAK GRADIENT: 56 MMHG
AVA (PEAK VEL): 1.33 CM2
AVA (VTI): 1.07 CM2
BNP SERPL-MCNC: 770 PG/ML (ref 0–99)
BUN SERPL-MCNC: 18 MG/DL (ref 6–23)
CALCIUM SERPL-MCNC: 8.9 MG/DL (ref 8.6–10.3)
CARDIAC TROPONIN I PNL SERPL HS: 31 NG/L (ref 0–20)
CARDIAC TROPONIN I PNL SERPL HS: 43 NG/L (ref 0–20)
CHLORIDE SERPL-SCNC: 106 MMOL/L (ref 98–107)
CO2 SERPL-SCNC: 25 MMOL/L (ref 21–32)
CREAT SERPL-MCNC: 1.04 MG/DL (ref 0.5–1.3)
EGFRCR SERPLBLD CKD-EPI 2021: 73 ML/MIN/1.73M*2
EJECTION FRACTION APICAL 4 CHAMBER: 54.1
EJECTION FRACTION: 68 %
ERYTHROCYTE [DISTWIDTH] IN BLOOD BY AUTOMATED COUNT: 13.1 % (ref 11.5–14.5)
EST. AVERAGE GLUCOSE BLD GHB EST-MCNC: 105 MG/DL
GLUCOSE BLD MANUAL STRIP-MCNC: 123 MG/DL (ref 74–99)
GLUCOSE SERPL-MCNC: 116 MG/DL (ref 74–99)
HBA1C MFR BLD: 5.3 % (ref ?–5.7)
HCT VFR BLD AUTO: 37.2 % (ref 41–52)
HGB BLD-MCNC: 12.9 G/DL (ref 13.5–17.5)
HOLD SPECIMEN: NORMAL
HOLD SPECIMEN: NORMAL
INR PPP: 2 (ref 0.9–1.1)
LACTATE SERPL-SCNC: 0.9 MMOL/L (ref 0.4–2)
LEFT ATRIUM VOLUME AREA LENGTH INDEX BSA: 22.7 ML/M2
LEFT VENTRICLE INTERNAL DIMENSION DIASTOLE: 3.71 CM (ref 3.5–6)
LEFT VENTRICULAR OUTFLOW TRACT DIAMETER: 2.1 CM
MCH RBC QN AUTO: 33.7 PG (ref 26–34)
MCHC RBC AUTO-ENTMCNC: 34.7 G/DL (ref 32–36)
MCV RBC AUTO: 97 FL (ref 80–100)
NRBC BLD-RTO: 0 /100 WBCS (ref 0–0)
PLATELET # BLD AUTO: 219 X10*3/UL (ref 150–450)
POTASSIUM SERPL-SCNC: 3.9 MMOL/L (ref 3.5–5.3)
PROTHROMBIN TIME: 21.6 SECONDS (ref 9.8–12.4)
RBC # BLD AUTO: 3.83 X10*6/UL (ref 4.5–5.9)
RH FACTOR (ANTIGEN D): NORMAL
RH FACTOR (ANTIGEN D): NORMAL
RIGHT VENTRICLE FREE WALL PEAK S': 14.9 CM/S
RIGHT VENTRICLE PEAK SYSTOLIC PRESSURE: 29 MMHG
SODIUM SERPL-SCNC: 137 MMOL/L (ref 136–145)
TRICUSPID ANNULAR PLANE SYSTOLIC EXCURSION: 2.4 CM
WBC # BLD AUTO: 7.6 X10*3/UL (ref 4.4–11.3)

## 2025-07-31 PROCEDURE — 86900 BLOOD TYPING SEROLOGIC ABO: CPT | Mod: IPSPLIT | Performed by: EMERGENCY MEDICINE

## 2025-07-31 PROCEDURE — 83605 ASSAY OF LACTIC ACID: CPT | Mod: IPSPLIT | Performed by: EMERGENCY MEDICINE

## 2025-07-31 PROCEDURE — 2500000002 HC RX 250 W HCPCS SELF ADMINISTERED DRUGS (ALT 637 FOR MEDICARE OP, ALT 636 FOR OP/ED): Mod: IPSPLIT | Performed by: STUDENT IN AN ORGANIZED HEALTH CARE EDUCATION/TRAINING PROGRAM

## 2025-07-31 PROCEDURE — 93306 TTE W/DOPPLER COMPLETE: CPT | Mod: IPSPLIT

## 2025-07-31 PROCEDURE — 85610 PROTHROMBIN TIME: CPT | Mod: IPSPLIT

## 2025-07-31 PROCEDURE — 2500000004 HC RX 250 GENERAL PHARMACY W/ HCPCS (ALT 636 FOR OP/ED): Mod: IPSPLIT | Performed by: EMERGENCY MEDICINE

## 2025-07-31 PROCEDURE — 36415 COLL VENOUS BLD VENIPUNCTURE: CPT | Mod: IPSPLIT | Performed by: EMERGENCY MEDICINE

## 2025-07-31 PROCEDURE — 36415 COLL VENOUS BLD VENIPUNCTURE: CPT | Mod: IPSPLIT

## 2025-07-31 PROCEDURE — 87075 CULTR BACTERIA EXCEPT BLOOD: CPT | Mod: GENLAB | Performed by: EMERGENCY MEDICINE

## 2025-07-31 PROCEDURE — 99223 1ST HOSP IP/OBS HIGH 75: CPT | Performed by: NURSE PRACTITIONER

## 2025-07-31 PROCEDURE — 2500000002 HC RX 250 W HCPCS SELF ADMINISTERED DRUGS (ALT 637 FOR MEDICARE OP, ALT 636 FOR OP/ED): Mod: IPSPLIT | Performed by: NURSE PRACTITIONER

## 2025-07-31 PROCEDURE — 2500000005 HC RX 250 GENERAL PHARMACY W/O HCPCS: Mod: IPSPLIT | Performed by: NURSE PRACTITIONER

## 2025-07-31 PROCEDURE — 71045 X-RAY EXAM CHEST 1 VIEW: CPT | Mod: IPSPLIT

## 2025-07-31 PROCEDURE — 85027 COMPLETE CBC AUTOMATED: CPT | Mod: IPSPLIT

## 2025-07-31 PROCEDURE — 85730 THROMBOPLASTIN TIME PARTIAL: CPT | Mod: IPSPLIT | Performed by: EMERGENCY MEDICINE

## 2025-07-31 PROCEDURE — 2500000001 HC RX 250 WO HCPCS SELF ADMINISTERED DRUGS (ALT 637 FOR MEDICARE OP): Mod: IPSPLIT | Performed by: NURSE PRACTITIONER

## 2025-07-31 PROCEDURE — 2500000001 HC RX 250 WO HCPCS SELF ADMINISTERED DRUGS (ALT 637 FOR MEDICARE OP): Mod: IPSPLIT | Performed by: STUDENT IN AN ORGANIZED HEALTH CARE EDUCATION/TRAINING PROGRAM

## 2025-07-31 PROCEDURE — 2020000001 HC ICU ROOM DAILY: Mod: IPSPLIT

## 2025-07-31 PROCEDURE — 82947 ASSAY GLUCOSE BLOOD QUANT: CPT | Mod: IPSPLIT

## 2025-07-31 PROCEDURE — 71045 X-RAY EXAM CHEST 1 VIEW: CPT | Performed by: RADIOLOGY

## 2025-07-31 PROCEDURE — 83880 ASSAY OF NATRIURETIC PEPTIDE: CPT | Mod: IPSPLIT | Performed by: INTERNAL MEDICINE

## 2025-07-31 PROCEDURE — 80048 BASIC METABOLIC PNL TOTAL CA: CPT | Mod: IPSPLIT

## 2025-07-31 PROCEDURE — 84484 ASSAY OF TROPONIN QUANT: CPT | Mod: IPSPLIT | Performed by: EMERGENCY MEDICINE

## 2025-07-31 PROCEDURE — 94760 N-INVAS EAR/PLS OXIMETRY 1: CPT | Mod: IPSPLIT

## 2025-07-31 RX ORDER — PREDNISOLONE ACETATE 10 MG/ML
1 SUSPENSION/ DROPS OPHTHALMIC 4 TIMES DAILY
Status: DISCONTINUED | OUTPATIENT
Start: 2025-07-31 | End: 2025-07-31

## 2025-07-31 RX ORDER — ACETAMINOPHEN 500 MG
125 TABLET ORAL DAILY
Status: DISCONTINUED | OUTPATIENT
Start: 2025-07-31 | End: 2025-08-02 | Stop reason: HOSPADM

## 2025-07-31 RX ORDER — PREDNISOLONE ACETATE 10 MG/ML
1 SUSPENSION/ DROPS OPHTHALMIC 4 TIMES DAILY
Status: DISCONTINUED | OUTPATIENT
Start: 2025-07-31 | End: 2025-08-02 | Stop reason: HOSPADM

## 2025-07-31 RX ORDER — SODIUM CHLORIDE 9 MG/ML
10 INJECTION, SOLUTION INTRAVENOUS CONTINUOUS PRN
Status: DISCONTINUED | OUTPATIENT
Start: 2025-07-31 | End: 2025-08-02 | Stop reason: HOSPADM

## 2025-07-31 RX ORDER — WARFARIN SODIUM 5 MG/1
5 TABLET ORAL
Status: DISCONTINUED | OUTPATIENT
Start: 2025-07-31 | End: 2025-08-02 | Stop reason: HOSPADM

## 2025-07-31 RX ADMIN — PREDNISOLONE ACETATE 1 DROP: 10 SUSPENSION/ DROPS OPHTHALMIC at 17:21

## 2025-07-31 RX ADMIN — SODIUM CHLORIDE 1000 ML: 9 INJECTION, SOLUTION INTRAVENOUS at 06:16

## 2025-07-31 RX ADMIN — PREDNISOLONE ACETATE 1 DROP: 10 SUSPENSION/ DROPS OPHTHALMIC at 13:23

## 2025-07-31 RX ADMIN — WARFARIN SODIUM 5 MG: 5 TABLET ORAL at 17:21

## 2025-07-31 RX ADMIN — METOPROLOL SUCCINATE 50 MG: 25 TABLET, EXTENDED RELEASE ORAL at 21:09

## 2025-07-31 RX ADMIN — PREDNISOLONE ACETATE 1 DROP: 10 SUSPENSION/ DROPS OPHTHALMIC at 21:09

## 2025-07-31 RX ADMIN — LEVOTHYROXINE SODIUM 100 MCG: 100 TABLET ORAL at 06:27

## 2025-07-31 RX ADMIN — CHOLECALCIFEROL TAB 125 MCG (5000 UNIT) 125 MCG: 125 TAB at 11:12

## 2025-07-31 ASSESSMENT — PAIN - FUNCTIONAL ASSESSMENT
PAIN_FUNCTIONAL_ASSESSMENT: 0-10

## 2025-07-31 ASSESSMENT — COGNITIVE AND FUNCTIONAL STATUS - GENERAL
DAILY ACTIVITIY SCORE: 24
DAILY ACTIVITIY SCORE: 24
MOBILITY SCORE: 24
MOBILITY SCORE: 24

## 2025-07-31 ASSESSMENT — ENCOUNTER SYMPTOMS
NERVOUS/ANXIOUS: 0
HEMATOLOGIC/LYMPHATIC NEGATIVE: 1
CARDIOVASCULAR NEGATIVE: 1
EYES NEGATIVE: 1
PSYCHIATRIC NEGATIVE: 1
CONFUSION: 0
FEVER: 0
SHORTNESS OF BREATH: 0
WEAKNESS: 0
PALPITATIONS: 0
RESPIRATORY NEGATIVE: 1
FATIGUE: 0
HEADACHES: 0
ABDOMINAL PAIN: 0
DIARRHEA: 0
FATIGUE: 1
ENDOCRINE NEGATIVE: 1
MUSCULOSKELETAL NEGATIVE: 1
GASTROINTESTINAL NEGATIVE: 1
ALLERGIC/IMMUNOLOGIC NEGATIVE: 1
ACTIVITY CHANGE: 1
VOMITING: 0
NEUROLOGICAL NEGATIVE: 1
NAUSEA: 0

## 2025-07-31 ASSESSMENT — PAIN SCALES - GENERAL
PAINLEVEL_OUTOF10: 0 - NO PAIN

## 2025-07-31 NOTE — CONSULTS
CRITICAL CARE CONSULT      Date of Service 2025    Reason for consult: A-Fib w RVR      Hospital Day # 0      Royce Briggs  Primary Care Physician: Alex Hummel MD      Subjective       HPI/Hospital Course   79M with history of A-fib (on coumadin) brought in the emergency room with complaint rapid heartbeat.  EKG showed A-fib rapid ventricular sponsor heart rate in the 130s.  Amio bolus and gtt initiated. Transferred to ICU.         > amio gtt ongoing > rate controlled, CARDS consult pending, home meds restarted, coumadin ongoing > hep gtt if not therapeutic in [place]          Past history: reviewed as below    Medical History[1]  Surgical History[2]  Social History[3]  Family History[4]        Objective         Vitals for last 24 hours Temperature Ins/Outs Weight   Temp:  [36.4 °C (97.5 °F)-36.5 °C (97.7 °F)] 36.5 °C (97.7 °F)  Heart Rate:  [] 96  Resp:  [11-21] 15  BP: ()/(54-96) 99/54 Temp (24hrs), Av.4 °C (97.6 °F), Min:36.4 °C (97.5 °F), Max:36.5 °C (97.7 °F)   No intake or output data in the 24 hours ending 25 2248 Wt Readings from Last 7 Encounters:   25 86.6 kg (191 lb)   25 86.9 kg (191 lb 9.6 oz)   10/30/24 81.6 kg (180 lb)   10/23/24 81.6 kg (180 lb)   10/16/24 86.6 kg (191 lb)   10/09/24 81.6 kg (180 lb)   10/02/24 85.5 kg (188 lb 6.4 oz)    Body mass index is 28.21 kg/m².   Sats Hemodynamics Cumulative I/O Vent Settings   SpO2 Readings from Last 3 Encounters:   25 97%   24 98%   24 96%          [unfilled]   [unfilled]     Exam:    Review of Systems   Constitutional:  Negative for fatigue and fever.   Respiratory:  Negative for shortness of breath.    Cardiovascular:  Negative for palpitations.   Gastrointestinal:  Negative for abdominal pain, diarrhea, nausea and vomiting.   Neurological:  Negative for weakness and headaches.   Psychiatric/Behavioral:  Negative for confusion. The patient is not nervous/anxious.   "        Physical Exam  Vitals reviewed.   Constitutional:       General: He is not in acute distress.     Appearance: He is not ill-appearing.   HENT:      Head: Normocephalic.     Eyes:      Pupils: Pupils are equal, round, and reactive to light.       Cardiovascular:      Rate and Rhythm: Tachycardia present. Rhythm irregular.   Pulmonary:      Effort: No respiratory distress.      Breath sounds: No wheezing, rhonchi or rales.   Abdominal:      General: There is no distension.      Palpations: Abdomen is soft.      Tenderness: There is no abdominal tenderness. There is no guarding.     Musculoskeletal:      Right lower leg: No edema.      Left lower leg: No edema.     Skin:     General: Skin is warm.     Neurological:      General: No focal deficit present.      Motor: No weakness.             Drains Urethral Catheter 18 Fr. (Active)   Number of days: 348          Medications     Scheduled Meds:  Scheduled Medications[5]    Continuous Infusions:  Continuous Medications[6]      Laboratory Data     ID/Cultures:    Date Result Date  Result   Blood       Respiratory       Urine       C. Diff       Flu/RSV/COVID       Other    No results found for: \"HIV1X2\"  No results found for: \"OQ9CDHHJ\"       Hematology  Results from last 7 days   Lab Units 07/30/25  1153   WBC AUTO x10*3/uL 6.0   RBC AUTO x10*6/uL 4.05*   HEMOGLOBIN g/dL 13.6   HEMATOCRIT % 39.5*   PLATELETS AUTO x10*3/uL 207     Results from last 7 days   Lab Units 07/30/25  1153   INR  1.9*     Chemistry       Results from last 7 days   Lab Units 07/30/25  1153   BNP pg/mL 117*     Results from last 7 days   Lab Units 07/30/25  1153   SODIUM mmol/L 137   POTASSIUM mmol/L 3.9   CHLORIDE mmol/L 101   CO2 mmol/L 26   BUN mg/dL 23   CREATININE mg/dL 1.01   CALCIUM mg/dL 9.4   ALBUMIN g/dL 4.6   PROTEIN TOTAL g/dL 7.8   BILIRUBIN TOTAL mg/dL 0.7   ALT U/L 16   AST U/L 24     Results from last 7 days   Lab Units 07/30/25  1247   LACTATE mmol/L 1.0     No lab exists " "for component: \"SCVO2\"      Blood Gases        No lab exists for component: \"GTD5LDPEJ\", \"WX1GIHDG\", \"BEDEFICIT\"       Data/Imaging     The following data have been personally reviewed by me on 7/30/2025 and are summarized below:     Date Result   EKG/Tele  Encounter Date: 07/30/25   ECG 12 lead   Result Value    Ventricular Rate 130    QRS Duration 94    QT Interval 306    QTC Calculation(Bazett) 450    R Axis -32    T Axis 83    QRS Count 21    Q Onset 223    T Offset 376    QTC Fredericia 396    Narrative    Atrial fibrillation with rapid ventricular response  Left axis deviation  Septal infarct (cited on or before 30-JUL-2025)  Abnormal ECG  When compared with ECG of 30-JUL-2025 11:35, (unconfirmed)  No significant change was found        Echo  No echocardiogram results found for the past 12 months   CXR  === 09/12/24 ===    XR HAND 3+ VIEWS LEFT    - Impression -  1. No fracture or dislocation.    MACRO:  None.    Signed by: Joel Bond 9/12/2024 2:40 PM  Dictation workstation:   [id number]     CT  === 07/01/24 ===    CT ABDOMEN PELVIS W IV CONTRAST    - Impression -  Intraluminal bladder soft tissue mass (2.1 x 1.7 cm), concerning for  neoplasm. Correlate with cystoscopy.    MACRO:  None    Signed by: Jeanne Bennett 7/1/2024 9:20 PM  Dictation workstation:   [id number]       Other     PFT's         Assessment/Plan     Problem List[7]    Neuro: GCS 15, neuro exams q2hrs, consider STAT CT head PRN for acute AMS, no sedative gtts currently  Cards: continuous cardiac monitoring, vasopressors PRN to maintain MAP > 65, 2D echo PENDING, trend BNP PRN, diuretics as tolerated, CARDS consult pending > amio gtt ongoing, continue GDMT, coumadin ongoing > consider hep gtt if not therapeutic INR  Pulm: continuous pulse oximetry, supplemental O2 PRN, maintain SpO2 > 92%, trend CXR/ABG PRN; nebs, inhaled corticosteroids PRN  FEN/GI: diet as tolerated, trend prealbumin qweekly, bowel regimen, replete electrolytes PRN keep " K > 4, Mg > 2, Phos > 3  Renal: strict I/Os, trend BUN/Cr, consider renal consult if develops worsening BRANNON  ID: trend fever curve, CBC, empiric abx NOT indicated, pan cx if febrile  Endo: insulin sliding scale PRN, hemoglobin A1c PENDING  Heme: trend and transfuse PRN to keep hgb > 7, plts > 50, INR < 1.8  Prophylaxis: SCDs, PPI  Dispo: critically ill, continue ICU care, Full Code, PT/OT    Attestation    Care was discussed with multidisciplinary staff.  I am located in [place] and the patient is located in [place].  After verbal consent, I have personally examined this critically ill patient at bedside (with the assistance of nursing staff via secured HIPAA-complaint Tele-Medicine cart), reviewed the medical record, flow sheets, laboratory data, and pertinent imaging studies.  Critical care time does not overlap with any other provider.  My personal critical care time spent does not include procedures and/or teaching.    Total Critical Care Time Spent (excluding procedures):  43 minutes.    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    Hematospermia     Hematuria 07/01/2024    History of backache 01/14/2014    History of depression 01/14/2014    History of esophageal ulcer 01/14/2014    History of mumps 01/14/2014    History of rectal bleeding     History of rubella 01/14/2014    HLD (hyperlipidemia)     HTN (hypertension)     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    Avascular necrosis of hip, left    Normal cardiac stress test 01/14/2014    On Coumadin for atrial  fibrillation     Personal history of other diseases of the circulatory system     History of cardiac disorder    Rhinitis, chronic     Tick bite 04/09/2022    Ulcer 01/14/2014    Undescended testicle     Unspecified abdominal hernia without obstruction or gangrene 01/14/2014    Hernia   [2]   Past Surgical History:  Procedure Laterality Date    CARDIAC CATHETERIZATION  04/15/2014    Cardiac Cath Procedure Outcome:    COLONOSCOPY W/ POLYPECTOMY  06/27/2024    HERNIA REPAIR  04/15/2014    Inguinal Hernia Repair   [3]   Social History  Socioeconomic History    Marital status:    Occupational History    Occupation: Retired   Tobacco Use    Smoking status: Former     Current packs/day: 1.00     Average packs/day: 1 pack/day for 10.0 years (10.0 ttl pk-yrs)     Types: Cigarettes    Smokeless tobacco: Never   Vaping Use    Vaping status: Never Used   Substance and Sexual Activity    Alcohol use: Yes     Alcohol/week: 2.0 standard drinks of alcohol     Types: 2 Glasses of wine per week    Drug use: Never     Social Drivers of Health     Financial Resource Strain: Low Risk  (7/30/2025)    Overall Financial Resource Strain (CARDIA)     Difficulty of Paying Living Expenses: Not very hard   Food Insecurity: No Food Insecurity (7/30/2025)    Hunger Vital Sign     Worried About Running Out of Food in the Last Year: Never true     Ran Out of Food in the Last Year: Never true   Transportation Needs: No Transportation Needs (7/30/2025)    PRAPARE - Transportation     Lack of Transportation (Medical): No     Lack of Transportation (Non-Medical): No   Intimate Partner Violence: Not At Risk (7/30/2025)    Humiliation, Afraid, Rape, and Kick questionnaire     Fear of Current or Ex-Partner: No     Emotionally Abused: No     Physically Abused: No     Sexually Abused: No   Housing Stability: Unknown (7/30/2025)    Housing Stability Vital Sign     Unable to Pay for Housing in the Last Year: No   [4] No family history on file.  [5]  [START ON 7/31/2025] pantoprazole, 40 mg, oral, Daily before breakfast   Or  [START ON 7/31/2025] esomeprazole, 40 mg, nasoduodenal tube, Daily before breakfast   Or  [START ON 7/31/2025] pantoprazole, 40 mg, intravenous, Daily before breakfast  [6] amiodarone, 0.5 mg/min, Last Rate: 0.5 mg/min (07/30/25 6820)  [7]   Patient Active Problem List  Diagnosis    Chronic bronchitis, unspecified chronic bronchitis type (Multi)    Longstanding persistent atrial fibrillation (Multi)    Atrial fibrillation (Multi)    Avascular necrosis of bone of hip (Multi)    DDD (degenerative disc disease), lumbar    Heart murmur    Hypercholesterolemia    Hypertension    Hypothyroidism    Left inguinal hernia    Bladder tumor    Malignant neoplasm of urinary bladder (Multi)    Aortic stenosis

## 2025-07-31 NOTE — DISCHARGE INSTR - DIET
A heart-healthy diet is recommended to reduce your unhealthy blood cholesterol levels, manage high blood pressure, and lower your risk for heart disease.  To follow a heart-healthy diet,  Eat a balanced diet with whole grains, fruits and vegetables, and lean protein sources.  Achieve and maintain a healthy weight.  Choose heart-healthy unsaturated fats. Limit your intake of saturated fats. Eat more plant-based or vegetarian meals using beans and soy foods for protein.  Eat whole, unprocessed foods to limit the amount of sodium (salt) you eat.  Limit refined carbohydrates especially sugar, sweets and sugar-sweetened beverages.  If you drink alcohol, do so in moderation: one serving per day (women) and two servings per day (men).  One serving is equivalent to 12 ounces beer, 5 ounces wine, or 1.5 ounces distilled spirits  Tips  Tips for Choosing Heart-Healthy Fats  Choose lean protein and low-fat dairy foods to reduce saturated fat intake.  Saturated fat is usually found in animal-based protein and is associated with certain health risks. Saturated fat is the biggest contributor to raised low-density lipoprotein (LDL) cholesterol levels in the diet. Research shows that limiting saturated fat lowers unhealthy cholesterol levels. Eat no more than 5-6% of your total calories each day from saturated fat. Ask your registered dietitian nutritionist (RDN) to help you determine how much saturated fat is right for you.  There are many foods that do not contain large amounts of saturated fats. Swapping these foods to replace foods high in saturated fats will help you limit the saturated fat you eat and improve your cholesterol levels. You can also try eating more plant-based or vegetarian meals.  Instead of… Try:   Whole milk, cheese, yogurt, and ice cream 1%, ½%, or skim milk, low-fat cheese, non-fat yogurt, and low-fat ice cream   Fatty, marbled beef and pork Lean beef, pork, or venison   Poultry with skin Poultry without  skin   Butter, stick margarine Reduced-fat, whipped, or liquid spreads   Coconut oil, palm oil Liquid vegetable oils: corn, canola, olive, soybean and safflower oils      Choose foods with heart healthy fats.  Polyunsaturated and monounsaturated fat are unsaturated fats that may help lower your blood cholesterol level when used in place of saturated fat in your diet.  Ask your RDN about taking a dietary supplement with plant sterols and stanols to help lower your cholesterol level.  Research shows that substituting saturated fats with unsaturated fats is beneficial to cholesterol levels. Try these easy swaps:     Instead of… Try:   Butter, stick margarine, or solid shortening Reduced-fat, whipped, or liquid spreads   Beef, pork, or poultry with skin    Fish, seafood, and skinless poultry   Chips, crackers, snack foods Raw or unsalted nuts and seeds or nut butters  Hummus with vegetables  Avocado on toast   Coconut oil, palm oil Liquid vegetable oils: corn, canola, olive, soybean and safflower oils   Tips for Choosing Heart-Healthy Carbohydrates  Consume foods rich in viscous (soluble) fiber  Viscous, or soluble, fiber is found in the walls of plant cells. Viscous fiber is found only in plant-based foods--animal-based foods like meat or dairy products do not contain fiber. In the stomach, viscous fibers absorb water and swell to form a thick, jelly-like mass. This helps to lower your unhealthy cholesterol    Rich sources of viscous fiber include asparagus, Christiana sprouts, sweet potatoes, turnips, apricots, mangoes, oranges, legumes, barley, oats, and oat bran.  Eat at least 5 to 10 grams of viscous fiber each day. As you increase your fiber intake gradually, also increase the amount of water you drink. This will help prevent constipation.  If you have difficulty achieving this goal, ask your RDN about fiber laxatives. Choose fiber supplements made with viscous fibers such as psyllium seed husks or methylcellulose  to help lower unhealthy cholesterol.  Limit refined carbohydrates  There are three types of carbohydrates: starches, sugar, and fiber. Some carbohydrates occur naturally in food, like the starches in rice or corn or the sugars in fruits and milk. Refined carbohydrates--foods with high amounts of simple sugars--can raise triglyceride levels. High triglyceride levels are associated with coronary heart disease.  Some examples of refined carbohydrate foods are table sugar, sweets, and beverages sweetened with added sugar.  Tips for Reducing Sodium (Salt)  Although sodium is important for your body to function, too much sodium can be harmful for people with high blood pressure.  As sodium and fluid buildup in your tissues and bloodstream, your blood pressure increases. High blood pressure may cause damage to other organs and increase your risk for a stroke.  Keep your salt intake to 2300 milligrams or less per day. Even if you take a pill for blood pressure or a water pill (diuretic) to remove fluid, it is still important to have less salt in your diet. Ask your RDN what amount of sodium is right for you.  Avoid processed foods.  Eat more fresh foods.  Fresh fruits and vegetables are naturally low in sodium, as well as frozen vegetables and fruits that have no added juices or sauces.  Fresh meats are lower in sodium than processed meats, such as rashid, sausage, and hotdogs.  Read the nutrition label or ask your  to help you find a fresh meat that is low in sodium.  Eat less salt--at the table and when cooking.  A single teaspoon of table salt has 2,300 mg of sodium.  Leave the salt out of recipes for pasta, casseroles, and soups.  Ask your RDN how to cook your favorite recipes without sodium  Be a smart .  Look for food packages that say “salt-free” or “sodium-free.” These items contain less than 5 milligrams of sodium per serving.  “Very low-sodium” products contain less than 35 milligrams of sodium per  "serving.  “Low-sodium” products contain less than 140 milligrams of sodium per serving.   Beware of “reduced salt” or \"reduced sodium\" products.  These items may still be high in sodium. Check the nutrition label.   Add flavors to your food without adding sodium.  Try lemon juice, lime juice, fruit juice or vinegar.    Dry or fresh herbs add flavor. Try basil, bay leaf, dill, rosemary, parsley, malik, dry mustard, nutmeg, thyme, and paprika.  Pepper, red pepper flakes, and cayenne pepper can add spice to your meals without adding sodium. Hot sauce contains sodium, but if you use just a drop or two, it will not add up to much.  Buy a sodium-free seasoning blend or make your own at home.     Additional Lifestyle Tips  Achieve and maintain a healthy weight.  Talk with your RDN or your doctor about what is a healthy weight for you.  Set goals to reach and maintain that weight.   To lose weight, reduce your calorie intake along with increasing your physical activity. A weight loss of 10 to 15 pounds could reduce LDL-cholesterol by 5 milligrams per deciliter.  Participate in physical activity.  Talk with your health care team to find out what types of physical activity are best for you. Set a plan to get about 30 minutes of exercise on most days.     Foods to Choose or to Limit  Food Group Foods to Choose Foods to Limit   Grains Whole grain breads and cereals, including whole wheat, barley, rye, buckwheat, corn, teff, quinoa, millet, amaranth, brown or wild rice, sorghum, and oats  Pasta, especially whole wheat or other whole grain types  Brown rice, quinoa or wild rice  Whole grain crackers, bread, rolls, pitas  Home-made bread with reduced-sodium baking soda Breads or crackers topped with salt  Cereals (hot or cold) with more than 300 mg sodium per serving  Biscuits, cornbread, and other “quick” breads prepared with baking soda  Bread crumbs or stuffing mix from a store  High-fat bakery products, such as doughnuts, " biscuits, croissants, [place] pastries, pies, cookies  Instant cooking foods to which you add hot water and stir--potatoes, noodles, rice, etc.  Packaged starchy foods--seasoned noodle or rice dishes, stuffing mix, macaroni and cheese dinner  Snacks made with partially hydrogenated oils, including chips, cheese puffs, snack mixes, regular crackers, butter-flavored popcorn   Protein Foods Lean cuts of beef and pork (loin, leg, round, extra lean hamburger)  Skinless poultry  Fish  Venison and other wild game  Dried beans and peas  Nuts and nut butters (unsalted)  Seeds and seed butters (unsalted)  Meat alternatives made with soy or textured vegetable protein  Egg whites or egg substitute  Cold cuts made with lean meat or soy protein Higher-fat cuts of meats (ribs, t-bone steak, regular hamburger)  Jordan, sausage, or hot dogs  Cold cuts, such as salami or bologna, deli meats, cured meats, corned beef  Organ meats (liver, brains, gizzards, sweetbreads)  Poultry with skin  Fried or smoked meat, poultry, and fish  Whole eggs and egg yolks (more than 2-4 per week)  Salted legumes, nuts, seeds, or nut/seed butters  Meat alternatives with high levels of sodium (>300 mg per serving) or saturated fat (>5 g per serving)   Dairy and Dairy Alternatives Nonfat (skim), low-fat, or 1%-fat milk  Nonfat or low-fat yogurt or cottage cheese  Fat-free and low-fat cheese  Fortified non-dairy milk: almond, cashew, pea, and soy Whole milk, 2% fat milk, buttermilk  Whole milk yogurt or ice cream  Cream, half-&-half  Cream cheese  Sour cream  Cheese   Vegetables Fresh, frozen, or canned vegetables without added fat or salt  Avocados Canned or frozen vegetables with salt, fresh vegetables prepared with salt, butter, cheese, or cream sauce  Fried vegetables  Pickled vegetables such as olives, pickles, or sauerkraut   Fruits Fresh, frozen, canned, or dried fruit Fried fruits  Fruits served with butter or cream   Oils Unsaturated oils (corn,  olive, peanut, soy, sunflower, canola)  Soft or liquid margarines and vegetable oil spreads  Salad dressings made from unsaturated fats Butter, stick margarine, shortening  Partially hydrogenated oils  Tropical oils (coconut, palm, palm kernel oils)   Other Prepared or homemade foods, including soups, casseroles, and salads made from recommended ingredients and contain <600 mg sodium.  Low-sodium seasonings (ketchup, barbeque sauce)  Spices, herbs, Salt-free seasoning mixes and marinades  Vinegar  Lemon or lime juice Prepared foods, including soups, casseroles, and salads made from recommended ingredients and contain >600 mg sodium.  Frozen meals and prepared sides that are >600 mg of sodium  Sugary and/or fatty desserts, candy, and other sweets  Salts:  sea salt, kosher salt, onion salt, and garlic salt, seasoning mixes containing salt  Flavorings: bouillon cubes, catsup or ketchup, barbeque sauce, Worcestershire sauce, soy sauce, salsa, relish, teriyaki sauce   Heart-Healthy Sample 1-Day Menu View Nutrient Info  Breakfast 1 cup oatmeal  1 cup fat-free milk  1 cup blueberries  1 ounce almonds, unsalted  1 cup brewed coffee   Lunch 2 slices whole-wheat bread  2 ounces lean turkey breast  1 ounce low-fat Swiss cheese  2 slices tomato  2 lettuce leaves  1 pear  1 cup skim milk   Afternoon Snack 1 ounce trail mix with unsalted nuts, seeds, and raisins   Evening Meal 3 ounces broiled salmon  2/3 cup brown rice  1 teaspoon margarine, soft tub  ½ cup broccoli, cooked  ½ cup carrots, cooked  1 cup tossed salad  1 teaspoon olive oil and vinegar dressing  1 small whole-wheat roll  1 teaspoon margarine, soft tub  1 cup tea   Evening Snack 1 small banana     From Nutrition Care Manual

## 2025-07-31 NOTE — H&P
"History Of Present Illness  Royce rBiggs \"Al\" is a 79 y.o. male with a past medical hx Afib longstanding (successful cardioversion x 1-2006), persistent(coumadin) aortic stenosis-BPH,  COPD (45 pack yr), esophageal ulcer, HLD, HTN, hypothyroid, idiopathic aseptic necrosis left hip, urothelial carcinoma high grade with TURBT 8/24 followed by BCG treatments with Dr. Bowling, presenting with feeling poorly while working outside. He came in to take a break and checked his blood pressure and was concerned it was a bit low and came in to the ED. He denies fever, chills, syncope, palpitations, sob or chest pain. He states he only intermittently has these \"spells\" with Afib. He denies urinary symptoms or any focal weakness or deficits.    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), Hematospermia, Hematuria (07/01/2024), History of backache (01/14/2014), History of cataract removal with insertion of prosthetic lens (07/24/2025), History of depression (01/14/2014), History of esophageal ulcer (01/14/2014), History of mumps (01/14/2014), History of rectal bleeding, History of rubella (01/14/2014), HLD (hyperlipidemia), HTN (hypertension), Idiopathic aseptic necrosis of left femur (Multi) (09/04/2019), Normal cardiac stress test (01/14/2014), On Coumadin for atrial fibrillation, Personal history of other diseases of the circulatory system, Rhinitis, chronic, Tick bite (04/09/2022), Ulcer " (01/14/2014), Undescended testicle, and Unspecified abdominal hernia without obstruction or gangrene (01/14/2014).    Surgical History  He has a past surgical history that includes Hernia repair (04/15/2014); Cardiac catheterization (04/15/2014); Colonoscopy w/ polypectomy (06/27/2024); Eye surgery; and Cataract extraction (Left, 07/24/2025).     Social History  He reports that he has quit smoking. His smoking use included cigarettes. He has a 10 pack-year smoking history. He has never used smokeless tobacco. He reports current alcohol use of about 2.0 standard drinks of alcohol per week. He reports that he does not use drugs.    Family History  Family History[1]     Allergies  Patient has no known allergies.    Review of Systems   Constitutional:  Positive for activity change and fatigue.   HENT: Negative.     Eyes: Negative.    Respiratory: Negative.     Cardiovascular: Negative.    Gastrointestinal: Negative.    Endocrine: Negative.    Genitourinary: Negative.    Musculoskeletal: Negative.    Allergic/Immunologic: Negative.    Neurological: Negative.    Hematological: Negative.    Psychiatric/Behavioral: Negative.          Physical Exam  Constitutional:       Appearance: Normal appearance.   HENT:      Head: Normocephalic.      Nose: Nose normal.      Mouth/Throat:      Mouth: Mucous membranes are moist.     Eyes:      Extraocular Movements: Extraocular movements intact.       Cardiovascular:      Rate and Rhythm: Rhythm irregular.      Pulses: Normal pulses.      Heart sounds: Murmur heard.   Pulmonary:      Effort: Pulmonary effort is normal.      Breath sounds: Normal breath sounds.   Abdominal:      Palpations: Abdomen is soft.     Musculoskeletal:         General: Normal range of motion.      Cervical back: Normal range of motion.     Skin:     General: Skin is warm and dry.      Capillary Refill: Capillary refill takes less than 2 seconds.     Neurological:      General: No focal deficit present.       Mental Status: He is alert and oriented to person, place, and time.     Psychiatric:         Mood and Affect: Mood normal.         Behavior: Behavior normal.          Last Recorded Vitals  /76 (BP Location: Right arm, Patient Position: Lying)   Pulse 96   Temp 36.6 °C (97.9 °F) (Temporal)   Resp 19   Wt 85.2 kg (187 lb 13.3 oz)   SpO2 95%     Relevant Results      Scheduled medications  Scheduled Medications[2]  Continuous medications  Continuous Medications[3]  PRN medications  PRN Medications[4]  Results for orders placed or performed during the hospital encounter of 07/30/25 (from the past 24 hours)   POCT GLUCOSE   Result Value Ref Range    POCT Glucose 123 (H) 74 - 99 mg/dL   CBC   Result Value Ref Range    WBC 7.6 4.4 - 11.3 x10*3/uL    nRBC 0.0 0.0 - 0.0 /100 WBCs    RBC 3.83 (L) 4.50 - 5.90 x10*6/uL    Hemoglobin 12.9 (L) 13.5 - 17.5 g/dL    Hematocrit 37.2 (L) 41.0 - 52.0 %    MCV 97 80 - 100 fL    MCH 33.7 26.0 - 34.0 pg    MCHC 34.7 32.0 - 36.0 g/dL    RDW 13.1 11.5 - 14.5 %    Platelets 219 150 - 450 x10*3/uL   Basic Metabolic Panel   Result Value Ref Range    Glucose 116 (H) 74 - 99 mg/dL    Sodium 137 136 - 145 mmol/L    Potassium 3.9 3.5 - 5.3 mmol/L    Chloride 106 98 - 107 mmol/L    Bicarbonate 25 21 - 32 mmol/L    Anion Gap 10 10 - 20 mmol/L    Urea Nitrogen 18 6 - 23 mg/dL    Creatinine 1.04 0.50 - 1.30 mg/dL    eGFR 73 >60 mL/min/1.73m*2    Calcium 8.9 8.6 - 10.3 mg/dL   Protime-INR   Result Value Ref Range    Protime 21.6 (H) 9.8 - 12.4 seconds    INR 2.0 (H) 0.9 - 1.1   Type and screen   Result Value Ref Range    ABO TYPE O     Rh TYPE NEG     ANTIBODY SCREEN NEG    Lactate   Result Value Ref Range    Lactate 0.9 0.4 - 2.0 mmol/L   APTT   Result Value Ref Range    aPTT 41 (H) 26 - 36 seconds   Troponin I, High Sensitivity, Initial   Result Value Ref Range    Troponin I, High Sensitivity 43 (H) 0 - 20 ng/L   SST TOP   Result Value Ref Range    Extra Tube Hold for add-ons.    Gray Top    Result Value Ref Range    Extra Tube Hold for add-ons.    Troponin, High Sensitivity, 1 Hour   Result Value Ref Range    Troponin I, High Sensitivity 31 (H) 0 - 20 ng/L   Abo/Rh Group Test - STAT (VERAB)   Result Value Ref Range    ABO TYPE O     Rh TYPE NEG    B-type natriuretic peptide   Result Value Ref Range     (H) 0 - 99 pg/mL   Transthoracic Echo Complete   Result Value Ref Range    BSA 2.05 m2     XR chest 1 view  Result Date: 7/31/2025  Interpreted By:  Adiel Espinal, STUDY: XR CHEST 1 VIEW; ;  7/31/2025 6:03 am   INDICATION: Signs/Symptoms:chest pain.   COMPARISON: 08/28/2018   ACCESSION NUMBER(S): [id number]   ORDERING CLINICIAN: WINTER CUNNINGHAM   TECHNIQUE: A portable radiograph of the chest is performed.   FINDINGS: The heart is mildly enlarged. The pulmonary vessels are within normal limits. There is no airspace consolidation, pleural effusion, or pneumothorax. 7 mm nodular density in the left apex is stable from the previous study. The osseous structures are diffusely demineralized though intact.       Mild cardiomegaly. No sign of acute cardiopulmonary disease or interval change from 08/28/2018.     MACRO: None   Signed by: Adiel Espinal 7/31/2025 7:27 AM Dictation workstation:   [id number]    ECG 12 lead  Result Date: 7/30/2025  Atrial fibrillation with rapid ventricular response Left axis deviation Septal infarct (cited on or before 30-JUL-2025) Abnormal ECG When compared with ECG of 30-JUL-2025 11:35, (unconfirmed) No significant change was found    ECG 12 lead  Result Date: 7/30/2025  Atrial fibrillation with rapid ventricular response Left axis deviation Septal infarct , age undetermined Abnormal ECG When compared with ECG of 02-JUL-2024 10:36, Atrial fibrillation has replaced Sinus rhythm Vent. rate has increased BY  88 BPM Septal infarct is now Present ST now depressed in Lateral leads T wave inversion no longer evident in Inferior leads    PACHYMETRY OU (BOTH  EYES)  Result Date: 7/2/2025  Table formatting from the original result was not included. Date of Procedure 7/2/2025. Notes Ophthalmology Exam    Pachymetry (7/2/2025)      Right Left  Thickness 543 543  Edited by: Chuck Pettit COT       CORNEAL TOPOGRAPHY ATLAS OU (BOTH EYES)  Result Date: 7/2/2025  Date of Procedure 7/2/2025. Technician Information Start time: 9:23 AM. Stop time: 9:23 AM. Astigmatism Right Eye Regular. Left Eye Regular. Interval Change Right Eye Intial . Left Eye Intial .     IOL BIOMETRY W/ IOL CALC OU (BOTH EYES)  Result Date: 7/2/2025  Date of Procedure 7/2/2025. Notes Measurements only - see Procedure Record under Scanned Documents for signed results.          Assessment/Plan   Assessment & Plan  Atrial fibrillation (Multi)    Malignant neoplasm of urinary bladder (Multi)    Hypothyroidism    Hypertension    Hypercholesterolemia    Aortic stenosis      Afib - longstanding, persistent (successful cardioversion x 1 - 2006)  HTN  HLD  Aortic Stenosis  - cardiology consult (Garo) appreciate recs   - continue lisinopril, metoprolol, coumadin   - monitor VS closely - hypotensive night of 7/31 and received 500cc's in ED and 1L NS next am   - amiodarone discontinued due to hypotension - switched to metoprolol for rate control   - has had TAVR discussion in the past   - has been on rosuvastatin with mixed compliance     Hypothyroid  - continue synthroid     BPH  Hx of urothelial carcinoma high grade   - TURBT 8/24 followed by BCG treatments with Dr. Bowling  - continue flomax    PUD: protonix (hx esophageal ulcer)  DVT: coumadin  Code Status: Full Code   Dispo: requires overnight monitoring          GEMMA Colón    Attending Attestation:    Patient was seen and examined face to face, history and physical was taken personally at bedside the APRN-CNP, was present for the whole duration of the exam who participated in the documentation of this note. I performed the medical  decision-making components (assessment and plan of care). I have reviewed the documentation and verified the findings in the note as written with additions or exceptions as stated in the body of this note.  Laying in bed in no distress, denies chest pain, shortness of breath, has been having sinus cough, no fever or chills.  He was in atrial fibrillation, was started on amiodarone drip, however patient was given his home dose of metoprolol and apparently patient was hypotensive, sweating and dizziness, was given bolus of IV fluid with some improvement amiodarone drip was discontinued.  On exam heart is irregular, lungs clear to auscultation bilaterally, abdomen soft bowel sounds are present, extremity no edema pedal pulses are present.  Atrial fibrillation with rapid ventricular rate, time being rate is better controlled still in A-fib, he is on Coumadin INR is fairly therapeutic, cardiology is consulted.  Getting 2D echo.    Dr. Kushal Stapleton MD  Internal Medicine        [1] No family history on file.  [2] cholecalciferol, 125 mcg, oral, Daily  levothyroxine, 100 mcg, oral, Daily  lisinopril, 2.5 mg, oral, Daily  metoprolol succinate XL, 50 mg, oral, Nightly  pantoprazole, 40 mg, oral, Daily before breakfast  prednisoLONE acetate, 1 drop, Left Eye, 4x daily  tamsulosin, 0.8 mg, oral, Nightly  warfarin, 5 mg, oral, Once per day on Sunday Tuesday Thursday Saturday  [START ON 8/1/2025] warfarin, 7.5 mg, oral, Once per day on Monday Wednesday Friday  [3] amiodarone, 0.5 mg/min, Last Rate: Stopped (07/31/25 0536)  sodium chloride 0.9%, 10 mL/hr  [4] PRN medications: acetaminophen, acetaminophen, melatonin, ondansetron, sodium chloride 0.9%

## 2025-07-31 NOTE — H&P
"History Of Present Illness  79-year-old male with outside in hot weather and felt chest heaviness.  No dizziness no shortness of breath.  Checking his blood pressure and heart rate to be elevated and due to history of A-fib presented to the ED.  For RVR was admitted to the ICU     Past Medical History  Medical History[1]    Surgical History  Surgical History[2]     Social History  He reports that he has quit smoking. His smoking use included cigarettes. He has a 10 pack-year smoking history. He has never used smokeless tobacco. He reports current alcohol use of about 2.0 standard drinks of alcohol per week. He reports that he does not use drugs.    Family History  Family History[3]     Allergies  Patient has no known allergies.    Review of Systems     Physical Exam     Last Recorded Vitals  Blood pressure 114/88, pulse 82, temperature 36.5 °C (97.7 °F), temperature source Temporal, resp. rate 11, height 1.753 m (5' 9\"), weight 86.6 kg (191 lb), SpO2 98%.    Relevant Results    Reviewed all results and imaging     Assessment & Plan  Atrial fibrillation (Multi)      On amiodarone drip  - Rate controlled at this time  - Baseline rate in the 80s 90s and unwell going to the bathroom was in the low 100s  - Otherwise remains hemodynamically stable  - Takes Coumadin at home and a combination of 5 mg and 7.5 mg  - Will give 7.5 mg tonight-patient says today is the 7.5 mg dose daily  -Hold home medications ordered other than multivitamins  - Will get Toprol-XL 50 mg tonight  - Will check INR in the morning  - INR currently 1.9 and if it dips will start heparin drip    Cruz Wright MD         [1]   Past Medical History:  Diagnosis Date    Atrial fibrillation     Longstanding/Persistant    Bladder tumor 07/01/2024    Right anterolateral bladder dome  2.1 x 1.7 cm    Cardiac murmur     Colon polyp 06/27/2024    COPD (chronic obstructive pulmonary disease) (Multi)     DDD (degenerative disc disease), lumbar  "    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    Hematospermia     Hematuria 07/01/2024    History of backache 01/14/2014    History of depression 01/14/2014    History of esophageal ulcer 01/14/2014    History of mumps 01/14/2014    History of rectal bleeding     History of rubella 01/14/2014    HLD (hyperlipidemia)     HTN (hypertension)     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    Avascular necrosis of hip, left    Normal cardiac stress test 01/14/2014    On Coumadin for atrial fibrillation     Personal history of other diseases of the circulatory system     History of cardiac disorder    Rhinitis, chronic     Tick bite 04/09/2022    Ulcer 01/14/2014    Undescended testicle     Unspecified abdominal hernia without obstruction or gangrene 01/14/2014    Hernia   [2]   Past Surgical History:  Procedure Laterality Date    CARDIAC CATHETERIZATION  04/15/2014    Cardiac Cath Procedure Outcome:    COLONOSCOPY W/ POLYPECTOMY  06/27/2024    HERNIA REPAIR  04/15/2014    Inguinal Hernia Repair   [3] No family history on file.

## 2025-07-31 NOTE — PROGRESS NOTES
"Pharmacy Medication History Review    Royce Briggs \"Al\" is a 79 y.o. male admitted for Atrial fibrillation (Multi). Pharmacy reviewed the patient's [id number] medications and allergies for accuracy.    Sources used to confirm medication list: Informant interview and Medication Dispense History  Informant: Patient/Pharmacy/'s office (Ingrid PharmD from [facility] and [facility], )  Informant credibility: Fair (Able to recall medication, indication, strength, frequency, and/or prescriber for >50% of medications).    Total number of adjustments: 6     Medications Added Medications Removed Medications Adjusted  Adjustments Made    Klayesta Multivitamin Dose, route, and frequency added    Warfarin 2.5 MG Rosuvastatin Once daily --> Twice weekly (Sticky note added)     CO-Q 10 Frequency added     Warfarin 5 MG Dose and frequency adjusted     The list below reflectives the updated PTA list. Please review each medication in order reconciliation for additional clarification and justification.  Medications Prior to Admission   Medication Sig Dispense Refill Last Dose/Taking    cholecalciferol (Vitamin D-3) 5,000 Units tablet Take 1 tablet (5,000 Units) by mouth once daily.   Evening    Klayesta 100,000 unit/gram powder APPLY TO AFFECTED AREA TWICE A DAY (Patient not taking: Reported on 7/30/2025) 60 g 1 Not Taking    levothyroxine (Synthroid, Levoxyl) 112 mcg tablet TAKE 1 TABLET BY MOUTH EVERY DAY IN THE MORNING ON EMPTY STOMACH FOR 90 DAYS 90 tablet 3 Morning    lisinopril 2.5 mg tablet TAKE 1 TABLET BY MOUTH EVERY DAY 90 tablet 1 Morning    m-vit/folic acid/D3/K2/[id number] (STAGES MEN'S MULTI-VITAMIN ORAL) Take 1 tablet by mouth once daily.   Morning    metoprolol succinate XL (Toprol-XL) 50 mg 24 hr tablet TAKE 1 TABLET BY MOUTH EVERY DAY 90 tablet 1 Evening    prednisoLONE acetate (Pred-Forte) 1 % ophthalmic suspension Administer 1 drop into affected eye(s) 4 times a day.   Evening    " rosuvastatin (Crestor) 5 mg tablet Take 1 tablet (5 mg) by mouth 2 times a week. (Patient not taking: Reported on 7/31/2025)   Not Taking    tamsulosin (Flomax) 0.4 mg 24 hr capsule Take 2 capsules (0.8 mg) by mouth once daily. 180 capsule 1 Evening    ubidecarenone (COENZYME Q10, BULK, MISC) Take 200 mg by mouth once daily.   Morning    warfarin (Coumadin) 5 mg tablet Take 1 tablet (5 MG) by mouth on Sunday, Tuesday, Thursday, and Saturday.  Take 1.5 tablets (7.5 MG) by mouth on Monday, Wednesday, and Friday.   Evening        The list below reflectives the updated allergy list. Please review each documented allergy for additional clarification and justification.  Allergies  Reviewed by Ranjana Townsend RN on 7/30/2025   No Known Allergies         Below are additional concerns with the patient's PTA list.  Spoke w/pt at bedside. Reviewed PTA and allergy list. Please review changes made to the PTA list in the chart above. Warfarin dosing schedule found in Chart Review from 06/04/2025. Note has conflicting administration instructions. Spoke with clinical pharmacist, Jeannie, and updated PTA list based on how pt takes. Pt was supposed to have INR rechecked on 07/02/2025 but missed his appointment. Warfarin missing from MD. Called [facility] in [place] and spoke with Hamida Quintero. Pt last had warfarin 5 MG filled in 05/2024 for a 90 DS with instructions of 2 tabs once daily.     Shari Muse CPhT  Medication History Pharmacy Technician  COURTNEY 8-4:30  Available via [employer] Secure Chat  OR  [phone]

## 2025-07-31 NOTE — SIGNIFICANT EVENT
RT attended rapid response called by RN, pt in a-fib, no SOB or distress noted, pt 93% on room air, no new respiratory orders at this time

## 2025-07-31 NOTE — PROGRESS NOTES
07/31/25 1152   Discharge Planning   Living Arrangements Alone   Support Systems Children;Family members   Assistance Needed Patient alert and oriented x 3;  Patient says he lives alone in a 2 story house with his dog.  There a 2 steps up to the kitchen.  Standard steps down to basement and upstairs.   He utilizess the main floor.  He does have family if needed.   He drives and doesn't use any DME.  He is independent with ADLS, IADLS, and ambulation.   Type of Residence Private residence   Number of Stairs to Enter Residence 0   Number of Stairs Within Residence 12  (2 to the kitchen, 12 to the 2nd floor, 12 to the basement)   Do you have animals or pets at home? Yes   Type of Animals or Pets 1 dog named [facility] or Post Acute Services None   Expected Discharge Disposition Home   Does the patient need discharge transport arranged? No     Confirmed address and pharmacy.  PCP is Dr. Alex Hummel.      [place] PLAN:  Home

## 2025-07-31 NOTE — CARE PLAN
"The patient's goals for the shift include \"whatever you say.\"    The clinical goals for the shift include converting to sinus rhythm, HR < 100 BPM, SBP will remain > 90, no S/S of ACS, and INR=2-3.    "

## 2025-07-31 NOTE — CARE PLAN
The patient's goals for the shift include getting a restful night before cardioversion in morning.    The clinical goals for the shift include converting to sinus rhythm, HR < 100 BPM, SBP will remain > 90, no S/S of ACS, NPO after midnight, and INR=2-3.

## 2025-07-31 NOTE — NURSING NOTE
"Patient brought in Prednisolone acetate 1% ophthalmic suspension eye drops from home. Patient signed \"Attachment A\" and pharmacy aware patient is using his medication from home.  "

## 2025-07-31 NOTE — DISCHARGE INSTR - OTHER ORDERS
Thank you for choosing [facility] for your Health Care needs.  Also, thank you for allowing us to take you and your families preferences into account when determining your discharge plan.  Stay well!     You may receive a survey in the mail within the next couple weeks. Please take the time to complete it and return it. Your input is ALWAYS important to us. Thank you!  Your Care Transition Team - Cheryl Dempsey  & Zulay      For questions about your medications listed on your discharge instructions, please call the Nurses Station at [phone].

## 2025-07-31 NOTE — PROGRESS NOTES
NIGHT ICU update    79 male with with Afib RVR. HDS, HD in the 80s and on movement goes upto 100s. Will order home meds including toprol Xl 50mg, crestor 5mg, flomax 0.8mg, coumadin 7.5mg. current INR 1.9. will repeat INR in AM. If it drops, will start heparin drip. Ordered lisinopril 2.5 mg daily and synthroid 100mcg for AM. On amio drip    Cruz Wright MD  Nephrology and Critical Care Medicine

## 2025-07-31 NOTE — CONSULTS
Nutrition Diet Education  Dietitian discretion, Diet education     Kitchen staff informed this dietitian that the patient was unhappy with receiving only half of a hamburger for lunch. The patient is currently on a cardiac diet. Provided education on the cardiac diet, including its purpose and limitations. Recommended to the provider that the diet order be changed to a low sodium diet (without the lower fat restriction) to allow for increased meal options while still supporting nutrition goals.    Education Documentation  Nutrition Related Education, taught by Dorota Luciano RDN, JENNIFER at 7/31/2025  3:30 PM.  Learner: Patient  Readiness: Acceptance  Method: Explanation  Response: Verbalizes Understanding      Time Spent (min): 45 minutes

## 2025-07-31 NOTE — CONSULTS
"Consults  History Of Present Illness:    Royce Briggs \"Al\" is a 79 y.o. male presenting with Generalized weakness, fatigue, unwell feeling, exertional shortness of breath, chest heaviness.  Symptoms appear to have been coming on in recent weeks but he felt really poorly yesterday while working outside in the yard.  When he came in and checked his blood pressure it was low and his heart was irregular.  He presented to the emergency department.  He was afebrile, oxygen saturation was normal.  He was tachycardic in atrial fibrillation with a rapid ventricular rate INR 1.9 hemoglobin 13.6 .  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  He was started on intravenous amiodarone drip continued on warfarin.  This morning he had rapid response due to his hypotension and bradycardia.  Amiodarone was consequently discontinued.  Currently he is resting and denies any cardiac symptoms.Patient was started on lisinopril 2.5 mg daily which is currently on hold.He was described to be pale diaphoretic and lethargic.  He received 1 L of normal saline and heart rate and blood pressure slowly improved.    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), Hematospermia, Hematuria (07/01/2024), History of backache (01/14/2014), History of cataract removal with insertion of prosthetic lens (07/24/2025), History of depression (01/14/2014), History of esophageal ulcer (01/14/2014), History of mumps (01/14/2014), History of rectal bleeding, History of rubella (01/14/2014), HLD (hyperlipidemia), HTN (hypertension), 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    Past Surgical History:  He has a past surgical history that includes Hernia repair (04/15/2014); Cardiac catheterization (04/15/2014); Colonoscopy w/ polypectomy (06/27/2024); Eye surgery; and Cataract extraction (Left, 07/24/2025).      Social History:  He reports that he has quit smoking. His smoking use included cigarettes. He has a 10 pack-year smoking history. He has never used smokeless tobacco. He reports current alcohol use of about 2.0 standard drinks of alcohol per week. He reports that he does not use drugs.    Family History:  Family History[1]     Allergies:  Patient has no known allergies.    Outpatient Medications:  Current Outpatient Medications   Medication Instructions    cholecalciferol (VITAMIN D-3) 5,000 Units, oral, Daily    Klayesta 100,000 unit/gram powder Topical, 2 times daily, to affected area    levothyroxine (SYNTHROID, LEVOXYL) 112 mcg, oral, Daily before breakfast    lisinopril 2.5 mg, oral, Daily    m-vit/folic acid/D3/K2/wlub340 (STAGES MEN'S MULTI-VITAMIN ORAL) 1 tablet, oral, Daily    metoprolol succinate XL (TOPROL-XL) 50 mg, oral, Daily    prednisoLONE acetate (Pred-Forte) 1 % ophthalmic suspension 1 drop, ophthalmic (eye), 4 times daily    rosuvastatin (CRESTOR) 5 mg, 2 times weekly    tamsulosin (FLOMAX) 0.8 mg, oral, Daily    ubidecarenone (COENZYME Q10, BULK, MISC) 200 mg, oral, Daily    warfarin (Coumadin) 5 mg tablet Take 1 tablet (5 MG) by mouth on Sunday, Tuesday, Thursday, and Saturday.  Take 1.5 tablets (7.5 MG) by mouth on Monday, Wednesday, and Friday.         Inpatient Medications:  PRN Medications[2]  Continuous Medications[3]    Last Labs:  Results for orders  placed or performed during the hospital encounter of 07/30/25 (from the past 96 hours)   CBC with Differential   Result Value Ref Range    WBC 6.0 4.4 - 11.3 x10*3/uL    nRBC 0.0 0.0 - 0.0 /100 WBCs    RBC 4.05 (L) 4.50 - 5.90 x10*6/uL    Hemoglobin 13.6 13.5 - 17.5 g/dL    Hematocrit 39.5 (L) 41.0 - 52.0 %    MCV 98 80 - 100 fL    MCH 33.6 26.0 - 34.0 pg    MCHC 34.4 32.0 - 36.0 g/dL    RDW 13.0 11.5 - 14.5 %    Platelets 207 150 - 450 x10*3/uL    Neutrophils % 52.5 40.0 - 80.0 %    Immature Granulocytes %, Automated 0.2 0.0 - 0.9 %    Lymphocytes % 26.4 13.0 - 44.0 %    Monocytes % 11.4 2.0 - 10.0 %    Eosinophils % 8.5 0.0 - 6.0 %    Basophils % 1.0 0.0 - 2.0 %    Neutrophils Absolute 3.14 1.60 - 5.50 x10*3/uL    Immature Granulocytes Absolute, Automated 0.01 0.00 - 0.50 x10*3/uL    Lymphocytes Absolute 1.58 0.80 - 3.00 x10*3/uL    Monocytes Absolute 0.68 0.05 - 0.80 x10*3/uL    Eosinophils Absolute 0.51 (H) 0.00 - 0.40 x10*3/uL    Basophils Absolute 0.06 0.00 - 0.10 x10*3/uL   Comprehensive Metabolic Panel   Result Value Ref Range    Glucose 106 (H) 74 - 99 mg/dL    Sodium 137 136 - 145 mmol/L    Potassium 3.9 3.5 - 5.3 mmol/L    Chloride 101 98 - 107 mmol/L    Bicarbonate 26 21 - 32 mmol/L    Anion Gap 14 10 - 20 mmol/L    Urea Nitrogen 23 6 - 23 mg/dL    Creatinine 1.01 0.50 - 1.30 mg/dL    eGFR 76 >60 mL/min/1.73m*2    Calcium 9.4 8.6 - 10.3 mg/dL    Albumin 4.6 3.4 - 5.0 g/dL    Alkaline Phosphatase 92 33 - 136 U/L    Total Protein 7.8 6.4 - 8.2 g/dL    AST 24 9 - 39 U/L    Bilirubin, Total 0.7 0.0 - 1.2 mg/dL    ALT 16 10 - 52 U/L   Brain Natriuretic Peptide   Result Value Ref Range     (H) 0 - 99 pg/mL   Protime-INR   Result Value Ref Range    Protime 20.9 (H) 9.8 - 12.4 seconds    INR 1.9 (H) 0.9 - 1.1   Troponin I, High Sensitivity, Initial   Result Value Ref Range    Troponin I, High Sensitivity 37 (H) 0 - 20 ng/L   Hemoglobin A1c   Result Value Ref Range    Hemoglobin A1C 5.3 See comment %     Estimated Average Glucose 105 Not Established mg/dL   ECG 12 lead   Result Value Ref Range    Ventricular Rate 141 BPM    Atrial Rate 101 BPM    QRS Duration 92 ms    QT Interval 298 ms    QTC Calculation(Bazett) 456 ms    R Axis -31 degrees    T Axis 84 degrees    QRS Count 23 beats    Q Onset 224 ms    T Offset 373 ms    QTC Fredericia 396 ms   ECG 12 lead   Result Value Ref Range    Ventricular Rate 130 BPM    QRS Duration 94 ms    QT Interval 306 ms    QTC Calculation(Bazett) 450 ms    R Axis -32 degrees    T Axis 83 degrees    QRS Count 21 beats    Q Onset 223 ms    T Offset 376 ms    QTC Fredericia 396 ms   Troponin, High Sensitivity, 1 Hour   Result Value Ref Range    Troponin I, High Sensitivity 60 (HH) 0 - 20 ng/L   TSH   Result Value Ref Range    Thyroid Stimulating Hormone 2.15 0.44 - 3.98 mIU/L   T4, free   Result Value Ref Range    Thyroxine, Free 1.10 0.61 - 1.12 ng/dL   Lactate   Result Value Ref Range    Lactate 1.0 0.4 - 2.0 mmol/L   POCT GLUCOSE   Result Value Ref Range    POCT Glucose 123 (H) 74 - 99 mg/dL   CBC   Result Value Ref Range    WBC 7.6 4.4 - 11.3 x10*3/uL    nRBC 0.0 0.0 - 0.0 /100 WBCs    RBC 3.83 (L) 4.50 - 5.90 x10*6/uL    Hemoglobin 12.9 (L) 13.5 - 17.5 g/dL    Hematocrit 37.2 (L) 41.0 - 52.0 %    MCV 97 80 - 100 fL    MCH 33.7 26.0 - 34.0 pg    MCHC 34.7 32.0 - 36.0 g/dL    RDW 13.1 11.5 - 14.5 %    Platelets 219 150 - 450 x10*3/uL   Basic Metabolic Panel   Result Value Ref Range    Glucose 116 (H) 74 - 99 mg/dL    Sodium 137 136 - 145 mmol/L    Potassium 3.9 3.5 - 5.3 mmol/L    Chloride 106 98 - 107 mmol/L    Bicarbonate 25 21 - 32 mmol/L    Anion Gap 10 10 - 20 mmol/L    Urea Nitrogen 18 6 - 23 mg/dL    Creatinine 1.04 0.50 - 1.30 mg/dL    eGFR 73 >60 mL/min/1.73m*2    Calcium 8.9 8.6 - 10.3 mg/dL   Protime-INR   Result Value Ref Range    Protime 21.6 (H) 9.8 - 12.4 seconds    INR 2.0 (H) 0.9 - 1.1   Type and screen   Result Value Ref Range    ABO TYPE O     Rh TYPE NEG      ANTIBODY SCREEN NEG    Lactate   Result Value Ref Range    Lactate 0.9 0.4 - 2.0 mmol/L   APTT   Result Value Ref Range    aPTT 41 (H) 26 - 36 seconds   Troponin I, High Sensitivity, Initial   Result Value Ref Range    Troponin I, High Sensitivity 43 (H) 0 - 20 ng/L   SST TOP   Result Value Ref Range    Extra Tube Hold for add-ons.    Gray Top   Result Value Ref Range    Extra Tube Hold for add-ons.    Troponin, High Sensitivity, 1 Hour   Result Value Ref Range    Troponin I, High Sensitivity 31 (H) 0 - 20 ng/L   Abo/Rh Group Test - STAT (VERAB)   Result Value Ref Range    ABO TYPE O     Rh TYPE NEG    B-type natriuretic peptide   Result Value Ref Range     (H) 0 - 99 pg/mL   Transthoracic Echo Complete   Result Value Ref Range    BSA 2.05 m2          Last Recorded Vitals:  Vitals:    07/31/25 1600 07/31/25 1700 07/31/25 1800 07/31/25 1900   BP: 104/69 111/60 114/64 112/59   BP Location: Right arm Right arm Right arm Right arm   Patient Position: Lying Lying Lying Lying   Pulse: 89 103 97 (!) 120   Resp: 15 18 20 18   Temp: 36.5 °C (97.7 °F)      TempSrc: Temporal      SpO2: 96% 100% 96% 100%   Weight:       Height:         I/O last 3 completed shifts:  In: 2000 (23.5 mL/kg) [IV Piggyback:2000]  Out: - (0 mL/kg)   Weight: 85.2 kg       Physical Exam:  Constitutional: Well developed, awake/alert/oriented x3, no distress, alert and cooperative  Eyes: PERRL, EOMI, clear sclera  ENMT: mucous membranes moist, no apparent injury, no lesions seen  Head/Neck: Neck supple, no apparent injury, thyroid without mass or tenderness, No JVD, trachea midline, no bruits  Respiratory/Thorax: Patent airways, CTAB, normal breath sounds with good chest expansion, thorax symmetric  Cardiovascular: Regular, rate and rhythm, no murmurs, 2+ equal pulses of the extremities, normal S 1and S 2  Gastrointestinal: Nondistended, soft, non-tender, no rebound tenderness or guarding, no masses palpable, no organomegaly, +BS, no  bruits  Musculoskeletal: ROM intact, no joint swelling, normal strength  Extremities: normal extremities, no cyanosis edema, contusions or wounds, no clubbing  Neurological: alert and oriented x3, intact senses, motor, response and reflexes, normal strength  Lymphatic: No significant lymphadenopathy  Psychological: Appropriate mood and behavior  Skin: Warm and dry, no lesions, no rashes     Assessment/Plan   Problem List Items Addressed This Visit          Cardiac and Vasculature    * (Principal) Atrial fibrillation (Multi) - Primary    Relevant Medications    metoprolol succinate XL (Toprol-XL) 24 hr tablet 50 mg    Other Relevant Orders    Cardioversion External     Other Visit Diagnoses         Atrial fibrillation with rapid ventricular response (Multi)        Relevant Medications    metoprolol tartrate (Lopressor) injection 5 mg (Completed)    metoprolol succinate XL (Toprol-XL) 24 hr tablet 50 mg    Other Relevant Orders    Transthoracic Echo Complete    Cardioversion External      Atrial fibrillation, chronic (Multi)        Relevant Medications    metoprolol tartrate (Lopressor) injection 5 mg (Completed)    metoprolol succinate XL (Toprol-XL) 24 hr tablet 50 mg    Other Relevant Orders    Transthoracic Echo Complete    Cardioversion External            Patient has been admitted to the hospital with symptomatic rapid atrial fibrillation.    INR was subtherapeutic.  He had hypotension likely due to a combination of rapid atrial fibrillation, amiodarone adverse effect and underlying aortic valve disease with loss of atrial kick.    Currently his blood pressure is 112/59 heart rate 120 oxygen saturation is 100%.    I would recommend discontinuing lisinopril due to his severe aortic stenosis, afterload reduction will result in severe hypotension.    Amiodarone drip has been discontinued.  Start amiodarone 200 mg daily.  Continue metoprolol succinate 50 mg daily.    If he remains hemodynamically stable overnight  may consider cardioversion tomorrow.  Echocardiogram will be reviewed.  Old records reviewed.    ICU critical care 60 min  Peripheral IV 07/31/25 20 G Right Antecubital (Active)   Site Assessment Clean;Dry;Intact 07/31/25 0900   Dressing Status Clean;Dry;Occlusive 07/31/25 0900   Number of days: 0       Code Status:  Full Code    I spent 45 minutes in the professional and overall care of this patient.        Garo Peralta MD        [1] No family history on file.  [2]   PRN medications   Medication    acetaminophen    acetaminophen    melatonin    ondansetron    sodium chloride 0.9%   [3]   Continuous Medications   Medication Dose Last Rate    amiodarone  0.5 mg/min Stopped (07/31/25 0536)    sodium chloride 0.9%  10 mL/hr

## 2025-07-31 NOTE — NURSING NOTE
This nurse noted his hr  suddenly dropped into the 40-60s and went into the room to assess the patient. Patient was noted to be lethargic, diaphoretic, and grey. amio was stopped at 0536, Rrt was called at 0539, EKG taken, Dr Sebastian arrived at bedside 1L NS was given Pt denied CP, SOB throughout, labs and CXR was taken, BP and HR slowly improved

## 2025-07-31 NOTE — CARE PLAN
"The patient's goals for the shift include \"whatever you say.\"     The clinical goals for the shift include converting to sinus rhythm, HR < 100 BPM, SBP will remain > 90, no S/S of ACS, and INR=2-3.    Patient remains in a.fib. Patient remains hypotensive, but asymptomatic. Orthostatic vital signs negative. Patient denies dyspnea, chest pain, and nausea. Patient eating well. Patient educated about bleeding precautions, emergencies, when to come to the ER, and diet when on Coumadin. INR=2 today. Coumadin continued, per patient's home routine. Patient remains in ICU. Amiodarone drip held all day due to adverse reactions of hypotension and bradycardia while on drip overnight.    "

## 2025-07-31 NOTE — NURSING NOTE
Patient continues to be hypotensive, but orthostatic vital signs negative. Lisinopril held this morning.

## 2025-08-01 ENCOUNTER — ANESTHESIA (OUTPATIENT)
Dept: OPERATING ROOM | Facility: HOSPITAL | Age: 80
End: 2025-08-01
Payer: MEDICARE

## 2025-08-01 ENCOUNTER — APPOINTMENT (OUTPATIENT)
Dept: OPERATING ROOM | Facility: HOSPITAL | Age: 80
End: 2025-08-01
Payer: MEDICARE

## 2025-08-01 ENCOUNTER — APPOINTMENT (OUTPATIENT)
Dept: CARDIOLOGY | Facility: HOSPITAL | Age: 80
End: 2025-08-01
Payer: MEDICARE

## 2025-08-01 ENCOUNTER — ANESTHESIA EVENT (OUTPATIENT)
Dept: OPERATING ROOM | Facility: HOSPITAL | Age: 80
End: 2025-08-01
Payer: MEDICARE

## 2025-08-01 LAB
ANION GAP SERPL CALC-SCNC: 11 MMOL/L (ref 10–20)
ATRIAL RATE: 56 BPM
BODY SURFACE AREA: 2.05 M2
BUN SERPL-MCNC: 21 MG/DL (ref 6–23)
CALCIUM SERPL-MCNC: 9 MG/DL (ref 8.6–10.3)
CHLORIDE SERPL-SCNC: 106 MMOL/L (ref 98–107)
CO2 SERPL-SCNC: 28 MMOL/L (ref 21–32)
CREAT SERPL-MCNC: 1.14 MG/DL (ref 0.5–1.3)
EGFRCR SERPLBLD CKD-EPI 2021: 65 ML/MIN/1.73M*2
ERYTHROCYTE [DISTWIDTH] IN BLOOD BY AUTOMATED COUNT: 13.3 % (ref 11.5–14.5)
GLUCOSE SERPL-MCNC: 94 MG/DL (ref 74–99)
HCT VFR BLD AUTO: 39.6 % (ref 41–52)
HGB BLD-MCNC: 13.2 G/DL (ref 13.5–17.5)
INR PPP: 2.5 (ref 0.9–1.1)
MCH RBC QN AUTO: 33.3 PG (ref 26–34)
MCHC RBC AUTO-ENTMCNC: 33.3 G/DL (ref 32–36)
MCV RBC AUTO: 100 FL (ref 80–100)
NRBC BLD-RTO: 0 /100 WBCS (ref 0–0)
P AXIS: 15 DEGREES
P OFFSET: 199 MS
P ONSET: 148 MS
PLATELET # BLD AUTO: 222 X10*3/UL (ref 150–450)
POTASSIUM SERPL-SCNC: 4.6 MMOL/L (ref 3.5–5.3)
PR INTERVAL: 148 MS
PROTHROMBIN TIME: 27.4 SECONDS (ref 9.8–12.4)
Q ONSET: 222 MS
QRS COUNT: 9 BEATS
QRS DURATION: 94 MS
QT INTERVAL: 404 MS
QTC CALCULATION(BAZETT): 389 MS
QTC FREDERICIA: 395 MS
R AXIS: -39 DEGREES
RBC # BLD AUTO: 3.96 X10*6/UL (ref 4.5–5.9)
SODIUM SERPL-SCNC: 140 MMOL/L (ref 136–145)
T AXIS: 13 DEGREES
T OFFSET: 424 MS
VENTRICULAR RATE: 56 BPM
WBC # BLD AUTO: 7.1 X10*3/UL (ref 4.4–11.3)

## 2025-08-01 PROCEDURE — 92960 CARDIOVERSION ELECTRIC EXT: CPT | Mod: IPSPLIT

## 2025-08-01 PROCEDURE — 2500000005 HC RX 250 GENERAL PHARMACY W/O HCPCS: Mod: IPSPLIT

## 2025-08-01 PROCEDURE — 85027 COMPLETE CBC AUTOMATED: CPT | Mod: IPSPLIT

## 2025-08-01 PROCEDURE — 2500000002 HC RX 250 W HCPCS SELF ADMINISTERED DRUGS (ALT 637 FOR MEDICARE OP, ALT 636 FOR OP/ED): Mod: IPSPLIT | Performed by: NURSE PRACTITIONER

## 2025-08-01 PROCEDURE — 5A2204Z RESTORATION OF CARDIAC RHYTHM, SINGLE: ICD-10-PCS | Performed by: INTERNAL MEDICINE

## 2025-08-01 PROCEDURE — 3700000002 HC GENERAL ANESTHESIA TIME - EACH INCREMENTAL 1 MINUTE: Mod: IPSPLIT | Performed by: NURSE ANESTHETIST, CERTIFIED REGISTERED

## 2025-08-01 PROCEDURE — 80048 BASIC METABOLIC PNL TOTAL CA: CPT | Mod: IPSPLIT

## 2025-08-01 PROCEDURE — 2500000001 HC RX 250 WO HCPCS SELF ADMINISTERED DRUGS (ALT 637 FOR MEDICARE OP): Mod: IPSPLIT | Performed by: INTERNAL MEDICINE

## 2025-08-01 PROCEDURE — 2500000001 HC RX 250 WO HCPCS SELF ADMINISTERED DRUGS (ALT 637 FOR MEDICARE OP): Mod: IPSPLIT | Performed by: NURSE PRACTITIONER

## 2025-08-01 PROCEDURE — 3700000001 HC GENERAL ANESTHESIA TIME - INITIAL BASE CHARGE: Mod: IPSPLIT | Performed by: NURSE ANESTHETIST, CERTIFIED REGISTERED

## 2025-08-01 PROCEDURE — 2500000002 HC RX 250 W HCPCS SELF ADMINISTERED DRUGS (ALT 637 FOR MEDICARE OP, ALT 636 FOR OP/ED): Mod: IPSPLIT | Performed by: STUDENT IN AN ORGANIZED HEALTH CARE EDUCATION/TRAINING PROGRAM

## 2025-08-01 PROCEDURE — 85610 PROTHROMBIN TIME: CPT | Mod: IPSPLIT | Performed by: NURSE PRACTITIONER

## 2025-08-01 PROCEDURE — 99233 SBSQ HOSP IP/OBS HIGH 50: CPT | Performed by: NURSE PRACTITIONER

## 2025-08-01 PROCEDURE — 2500000004 HC RX 250 GENERAL PHARMACY W/ HCPCS (ALT 636 FOR OP/ED): Mod: JW,IPSPLIT | Performed by: NURSE ANESTHETIST, CERTIFIED REGISTERED

## 2025-08-01 PROCEDURE — 2500000005 HC RX 250 GENERAL PHARMACY W/O HCPCS: Mod: IPSPLIT | Performed by: NURSE ANESTHETIST, CERTIFIED REGISTERED

## 2025-08-01 PROCEDURE — 93320 DOPPLER ECHO COMPLETE: CPT | Mod: IPSPLIT

## 2025-08-01 PROCEDURE — 94760 N-INVAS EAR/PLS OXIMETRY 1: CPT | Mod: IPSPLIT

## 2025-08-01 PROCEDURE — 1200000002 HC GENERAL ROOM WITH TELEMETRY DAILY: Mod: IPSPLIT

## 2025-08-01 PROCEDURE — 36415 COLL VENOUS BLD VENIPUNCTURE: CPT | Mod: IPSPLIT

## 2025-08-01 PROCEDURE — 93005 ELECTROCARDIOGRAM TRACING: CPT | Mod: IPSPLIT

## 2025-08-01 PROCEDURE — 2500000002 HC RX 250 W HCPCS SELF ADMINISTERED DRUGS (ALT 637 FOR MEDICARE OP, ALT 636 FOR OP/ED): Mod: IPSPLIT | Performed by: INTERNAL MEDICINE

## 2025-08-01 RX ORDER — MIDAZOLAM HYDROCHLORIDE 1 MG/ML
INJECTION, SOLUTION INTRAMUSCULAR; INTRAVENOUS AS NEEDED
Status: DISCONTINUED | OUTPATIENT
Start: 2025-08-01 | End: 2025-08-01

## 2025-08-01 RX ORDER — METOPROLOL SUCCINATE 25 MG/1
25 TABLET, EXTENDED RELEASE ORAL ONCE
Status: COMPLETED | OUTPATIENT
Start: 2025-08-01 | End: 2025-08-01

## 2025-08-01 RX ORDER — ETOMIDATE 2 MG/ML
INJECTION INTRAVENOUS AS NEEDED
Status: DISCONTINUED | OUTPATIENT
Start: 2025-08-01 | End: 2025-08-01

## 2025-08-01 RX ORDER — LIDOCAINE HYDROCHLORIDE 20 MG/ML
INJECTION, SOLUTION INFILTRATION; PERINEURAL AS NEEDED
Status: DISCONTINUED | OUTPATIENT
Start: 2025-08-01 | End: 2025-08-01

## 2025-08-01 RX ORDER — PROPOFOL 10 MG/ML
INJECTION, EMULSION INTRAVENOUS AS NEEDED
Status: DISCONTINUED | OUTPATIENT
Start: 2025-08-01 | End: 2025-08-01

## 2025-08-01 RX ORDER — LIDOCAINE HYDROCHLORIDE 20 MG/ML
SOLUTION OROPHARYNGEAL
Status: DISPENSED
Start: 2025-08-01 | End: 2025-08-01

## 2025-08-01 RX ORDER — PHENYLEPHRINE HYDROCHLORIDE 10 MG/ML
INJECTION INTRAVENOUS AS NEEDED
Status: DISCONTINUED | OUTPATIENT
Start: 2025-08-01 | End: 2025-08-01

## 2025-08-01 RX ORDER — AMIODARONE HYDROCHLORIDE 200 MG/1
100 TABLET ORAL DAILY
Status: DISCONTINUED | OUTPATIENT
Start: 2025-08-01 | End: 2025-08-02

## 2025-08-01 RX ORDER — FENTANYL CITRATE 50 UG/ML
INJECTION, SOLUTION INTRAMUSCULAR; INTRAVENOUS AS NEEDED
Status: DISCONTINUED | OUTPATIENT
Start: 2025-08-01 | End: 2025-08-01

## 2025-08-01 RX ADMIN — AMIODARONE HYDROCHLORIDE 100 MG: 200 TABLET ORAL at 10:11

## 2025-08-01 RX ADMIN — LIDOCAINE HYDROCHLORIDE 40 MG: 20 INJECTION, SOLUTION INFILTRATION; PERINEURAL at 09:02

## 2025-08-01 RX ADMIN — METOPROLOL SUCCINATE 25 MG: 25 TABLET, EXTENDED RELEASE ORAL at 20:37

## 2025-08-01 RX ADMIN — WARFARIN SODIUM 7.5 MG: 5 TABLET ORAL at 17:17

## 2025-08-01 RX ADMIN — PROPOFOL 10 MG: 10 INJECTION, EMULSION INTRAVENOUS at 09:14

## 2025-08-01 RX ADMIN — ETOMIDATE 6 MG: 2 INJECTION INTRAVENOUS at 09:02

## 2025-08-01 RX ADMIN — LEVOTHYROXINE SODIUM 100 MCG: 100 TABLET ORAL at 10:11

## 2025-08-01 RX ADMIN — PHENYLEPHRINE HYDROCHLORIDE 100 MCG: 10 INJECTION INTRAVENOUS at 09:10

## 2025-08-01 RX ADMIN — PREDNISOLONE ACETATE 1 DROP: 10 SUSPENSION/ DROPS OPHTHALMIC at 17:07

## 2025-08-01 RX ADMIN — PREDNISOLONE ACETATE 1 DROP: 10 SUSPENSION/ DROPS OPHTHALMIC at 07:10

## 2025-08-01 RX ADMIN — PHENYLEPHRINE HYDROCHLORIDE 100 MCG: 10 INJECTION INTRAVENOUS at 09:05

## 2025-08-01 RX ADMIN — CHOLECALCIFEROL TAB 125 MCG (5000 UNIT) 125 MCG: 125 TAB at 10:11

## 2025-08-01 RX ADMIN — PHENYLEPHRINE HYDROCHLORIDE 100 MCG: 10 INJECTION INTRAVENOUS at 09:15

## 2025-08-01 RX ADMIN — MIDAZOLAM 2 MG: 1 INJECTION INTRAMUSCULAR; INTRAVENOUS at 08:56

## 2025-08-01 RX ADMIN — FENTANYL CITRATE 25 MCG: 50 INJECTION, SOLUTION INTRAMUSCULAR; INTRAVENOUS at 08:56

## 2025-08-01 RX ADMIN — PROPOFOL 10 MG: 10 INJECTION, EMULSION INTRAVENOUS at 09:11

## 2025-08-01 RX ADMIN — BENZOCAINE, BUTAMBEN, AND TETRACAINE HYDROCHLORIDE: .028; .004; .004 AEROSOL, SPRAY TOPICAL at 08:58

## 2025-08-01 RX ADMIN — TAMSULOSIN HYDROCHLORIDE 0.8 MG: 0.4 CAPSULE ORAL at 20:38

## 2025-08-01 RX ADMIN — PROPOFOL 10 MG: 10 INJECTION, EMULSION INTRAVENOUS at 09:05

## 2025-08-01 RX ADMIN — PROPOFOL 10 MG: 10 INJECTION, EMULSION INTRAVENOUS at 09:17

## 2025-08-01 RX ADMIN — PROPOFOL 10 MG: 10 INJECTION, EMULSION INTRAVENOUS at 09:08

## 2025-08-01 RX ADMIN — PREDNISOLONE ACETATE 1 DROP: 10 SUSPENSION/ DROPS OPHTHALMIC at 20:39

## 2025-08-01 RX ADMIN — PREDNISOLONE ACETATE 1 DROP: 10 SUSPENSION/ DROPS OPHTHALMIC at 13:45

## 2025-08-01 SDOH — HEALTH STABILITY: MENTAL HEALTH: CURRENT SMOKER: 0

## 2025-08-01 ASSESSMENT — COGNITIVE AND FUNCTIONAL STATUS - GENERAL
DAILY ACTIVITIY SCORE: 24
MOBILITY SCORE: 24

## 2025-08-01 ASSESSMENT — PAIN SCALES - GENERAL
PAIN_LEVEL: 0
PAINLEVEL_OUTOF10: 0 - NO PAIN

## 2025-08-01 ASSESSMENT — PAIN - FUNCTIONAL ASSESSMENT
PAIN_FUNCTIONAL_ASSESSMENT: 0-10

## 2025-08-01 NOTE — PROGRESS NOTES
"Royce Briggs \"Al\" is a 79 y.o. male on day 2 of admission presenting with Atrial fibrillation (Multi).    Subjective   He is resting in bed today, back in sinus rhythm after cardioversion was complete this morning with Dr. Wyman.  He denies chest pain and shortness of breath.  We discussed discharge tomorrow morning after further monitoring, he is agreeable to this plan.  All questions answered.       Objective     Physical Exam  Constitutional:       General: He is not in acute distress.     Appearance: Normal appearance. He is not toxic-appearing.   HENT:      Head: Normocephalic and atraumatic.      Mouth/Throat:      Mouth: Mucous membranes are moist.     Eyes:      Extraocular Movements: Extraocular movements intact.      Pupils: Pupils are equal, round, and reactive to light.       Cardiovascular:      Rate and Rhythm: Normal rate and regular rhythm.      Pulses: Normal pulses.      Heart sounds: Normal heart sounds. No murmur heard.     No gallop.   Pulmonary:      Effort: Pulmonary effort is normal. No respiratory distress.      Breath sounds: Normal breath sounds. No wheezing, rhonchi or rales.   Abdominal:      General: Bowel sounds are normal. There is no distension.      Palpations: Abdomen is soft.      Tenderness: There is no abdominal tenderness. There is no guarding or rebound.     Musculoskeletal:         General: No swelling, tenderness, deformity or signs of injury. Normal range of motion.      Cervical back: Normal range of motion and neck supple.     Skin:     General: Skin is warm and dry.      Capillary Refill: Capillary refill takes less than 2 seconds.      Coloration: Skin is not jaundiced.      Findings: No bruising or rash.     Neurological:      General: No focal deficit present.      Mental Status: He is alert and oriented to person, place, and time. Mental status is at baseline.      Cranial Nerves: No cranial nerve deficit.      Sensory: No sensory deficit.      Motor: No " "weakness.      Gait: Gait normal.     Psychiatric:         Mood and Affect: Mood normal.         Behavior: Behavior normal.         Thought Content: Thought content normal.         Judgment: Judgment normal.       Last Recorded Vitals  Blood pressure 105/72, pulse 54, temperature 36.2 °C (97.2 °F), temperature source Temporal, resp. rate 17, height 1.753 m (5' 9\"), weight 85.9 kg (189 lb 6.4 oz), SpO2 97%.  Intake/Output last 3 Shifts:  I/O last 3 completed shifts:  In: 2590 (30.1 mL/kg) [P.O.:590; IV Piggyback:2000]  Out: - (0 mL/kg)   Weight: 85.9 kg     Scheduled medications  Scheduled Medications[1]  Continuous medications  Continuous Medications[2]  PRN medications  PRN Medications[3]    Relevant Results  ECG 12 lead  Result Date: 8/1/2025  Sinus bradycardia Left axis deviation Septal infarct (cited on or before 30-JUL-2025) Abnormal ECG When compared with ECG of 30-JUL-2025 12:43, (unconfirmed) Sinus rhythm has replaced Atrial fibrillation Vent. rate has decreased BY  74 BPM Non-specific change in ST segment in Lateral leads    Transthoracic Echo Complete  Result Date: 7/31/2025  CONCLUSIONS:  1. There is normal right ventricular global systolic function.  2. Mildly enlarged right ventricle.  3. The left and right atrium are mildly dilated.  4. Mild to moderate mitral valve regurgitation.  5. The Doppler estimated RVSP is within normal limits at 29 mmHg.  6. Severe aortic valve stenosis. The peak and mean gradients are 56 mmHg and 29 mmHg respectively.  7. There is severe aortic valve cusp calcification.  8. Estimated CVP is normal.  9. The left ventricular systolic function is normal with a visually estimated ejection fraction of 65-70%.     XR chest 1 view  Result Date: 7/31/2025  Mild cardiomegaly. No sign of acute cardiopulmonary disease or interval change from 08/28/2018.     MACRO: None   Signed by: Adiel Espinal 7/31/2025 7:27 AM Dictation workstation:   [id number]    ECG 12 lead  Result Date: " 7/30/2025  Atrial fibrillation with rapid ventricular response Left axis deviation Septal infarct (cited on or before 30-JUL-2025) Abnormal ECG When compared with ECG of 30-JUL-2025 11:35, (unconfirmed) No significant change was found   Latest Reference Range & Units 07/31/25 05:46 07/31/25 08:31 07/31/25 08:32 08/01/25 05:33   GLUCOSE 74 - 99 mg/dL 116 (H)   94   SODIUM 136 - 145 mmol/L 137   140   POTASSIUM 3.5 - 5.3 mmol/L 3.9   4.6   CHLORIDE 98 - 107 mmol/L 106   106   Bicarbonate 21 - 32 mmol/L 25   28   Anion Gap 10 - 20 mmol/L 10   11   Blood Urea Nitrogen 6 - 23 mg/dL 18   21   Creatinine 0.50 - 1.30 mg/dL 1.04   1.14   EGFR >60 mL/min/1.73m*2 73   65   Calcium 8.6 - 10.3 mg/dL 8.9   9.0   Lactate 0.4 - 2.0 mmol/L 0.9      BNP 0 - 99 pg/mL   770 (H)    Troponin I, High Sensitivity 0 - 20 ng/L 43 (H) 31 (H)        Latest Reference Range & Units 07/31/25 05:42 08/01/25 05:32   WBC 4.4 - 11.3 x10*3/uL 7.6 7.1   nRBC 0.0 - 0.0 /100 WBCs 0.0 0.0   RBC 4.50 - 5.90 x10*6/uL 3.83 (L) 3.96 (L)   HEMOGLOBIN 13.5 - 17.5 g/dL 12.9 (L) 13.2 (L)   HEMATOCRIT 41.0 - 52.0 % 37.2 (L) 39.6 (L)   MCV 80 - 100 fL 97 100   MCH 26.0 - 34.0 pg 33.7 33.3   MCHC 32.0 - 36.0 g/dL 34.7 33.3   RED CELL DISTRIBUTION WIDTH 11.5 - 14.5 % 13.1 13.3   Platelets 150 - 450 x10*3/uL 219 222     Assessment & Plan  Atrial fibrillation (Multi)    Hypercholesterolemia    Aortic stenosis      Afib - longstanding, persistent (successful cardioversion x 1 - 2006)  HTN  HLD  Aortic Stenosis  - cardiology consult (Garo) appreciate recs   - continue lisinopril, metoprolol, coumadin   - monitor VS closely - hypotensive night of 7/31 and received 500cc's in ED and 1L NS next am   - amiodarone discontinued due to hypotension - switched to metoprolol for rate control   - has had TAVR discussion in the past   - has been on rosuvastatin with mixed compliance   - continue lower dose amiodarone     Hypothyroid  - continue synthroid      BPH  Hx of  urothelial carcinoma high grade   - TURBT 8/24 followed by BCG treatments with Dr. Bowling  - continue flomax     PUD: protonix (hx esophageal ulcer)  DVT: coumadin  Code Status: Full Code   Dispo: requires overnight monitoring     KANG Son-CNP         [1] amiodarone, 100 mg, oral, Daily  cholecalciferol, 125 mcg, oral, Daily  levothyroxine, 100 mcg, oral, Daily  lidocaine, , ,   metoprolol succinate XL, 50 mg, oral, Nightly  pantoprazole, 40 mg, oral, Daily before breakfast  perflutren lipid microspheres, 0.5-10 mL of dilution, intravenous, Once in imaging  perflutren protein A microsphere, 0.5 mL, intravenous, Once in imaging  prednisoLONE acetate, 1 drop, Left Eye, 4x daily  sulfur hexafluoride microsphr, 2 mL, intravenous, Once in imaging  [Held by provider] tamsulosin, 0.8 mg, oral, Nightly  warfarin, 5 mg, oral, Once per day on Sunday Tuesday Thursday Saturday  warfarin, 7.5 mg, oral, Once per day on Monday Wednesday Friday  [2] sodium chloride 0.9%, 10 mL/hr  [3] PRN medications: acetaminophen, acetaminophen, lidocaine, melatonin, ondansetron, sodium chloride 0.9%

## 2025-08-01 NOTE — ANESTHESIA PREPROCEDURE EVALUATION
"Patient: Royce Briggs \"Al\"    Procedure Information       Date/Time: 08/01/25 0800    Procedure: TRANSESOPHAGEAL ECHO (CHRISTO)    Location: [facility] OR            Relevant Problems   Anesthesia (within normal limits)      Cardiac   (+) Aortic stenosis   (+) Atrial fibrillation (Multi)   (+) Heart murmur   (+) Hypercholesterolemia   (+) Hypertension   (+) Longstanding persistent atrial fibrillation (Multi)      Pulmonary (within normal limits)      Neuro (within normal limits)      GI (within normal limits)      /Renal (within normal limits)      Liver (within normal limits)      Endocrine   (+) Hypothyroidism      Hematology (within normal limits)      Musculoskeletal   (+) DDD (degenerative disc disease), lumbar      HEENT (within normal limits)      ID (within normal limits)      Skin (within normal limits)      GYN (within normal limits)     Vitals:    08/01/25 0920   BP:    Pulse: 52   Resp: 13   Temp:    SpO2: 100%       Surgical History[1]  Medical History[2]  Current Medications[3]  Prior to Admission medications   Medication Sig Start Date End Date Taking? Authorizing Provider   cholecalciferol (Vitamin D-3) 5,000 Units tablet Take 1 tablet (5,000 Units) by mouth once daily. 11/8/16   Historical Provider, MD Castellanos 100,000 unit/gram powder APPLY TO AFFECTED AREA TWICE A DAY  Patient not taking: Reported on 7/30/2025 12/30/24   Alex Hummel MD   levothyroxine (Synthroid, Levoxyl) 112 mcg tablet TAKE 1 TABLET BY MOUTH EVERY DAY IN THE MORNING ON EMPTY STOMACH FOR 90 DAYS 2/27/25 2/22/26  Alex Hummel MD   lisinopril 2.5 mg tablet TAKE 1 TABLET BY MOUTH EVERY DAY 2/27/25   Alex Hummel MD   m-vit/folic acid/D3/K2/libj827 (STAGES MEN'S MULTI-VITAMIN ORAL) Take 1 tablet by mouth once daily.    Historical Provider, MD   metoprolol succinate XL (Toprol-XL) 50 mg 24 hr tablet TAKE 1 TABLET BY MOUTH EVERY DAY 7/17/25   Alex Hummel MD   prednisoLONE acetate (Pred-Forte) 1 % " ophthalmic suspension Administer 1 drop into affected eye(s) 4 times a day. 5/19/25   Historical Provider, MD   rosuvastatin (Crestor) 5 mg tablet Take 1 tablet (5 mg) by mouth 2 times a week.  Patient not taking: Reported on 7/31/2025 12/19/18   Historical Provider, MD   tamsulosin (Flomax) 0.4 mg 24 hr capsule Take 2 capsules (0.8 mg) by mouth once daily. 2/12/25 2/12/26  Alex Hummel MD   ubidecarenone (COENZYME Q10, BULK, MISC) Take 200 mg by mouth once daily.    Historical Provider, MD   warfarin (Coumadin) 5 mg tablet Take 1 tablet (5 MG) by mouth on Sunday, Tuesday, Thursday, and Saturday.  Take 1.5 tablets (7.5 MG) by mouth on Monday, Wednesday, and Friday.    Historical Provider, MD   LYCOPENE ORAL Take by mouth once daily.  Patient not taking: Reported on 7/30/2025 7/30/25  Historical Provider, MD   warfarin (Coumadin) 2.5 mg tablet Take 1 tablet (2.5 mg) by mouth. Take with 5 mg tab 3 days a week  7/31/25  Historical Provider, MD     RX Allergies[4]  Social History     Tobacco Use    Smoking status: Former     Current packs/day: 1.00     Average packs/day: 1 pack/day for 10.0 years (10.0 ttl pk-yrs)     Types: Cigarettes    Smokeless tobacco: Never   Substance Use Topics    Alcohol use: Yes     Alcohol/week: 2.0 standard drinks of alcohol     Types: 2 Glasses of wine per week         Chemistry    Lab Results   Component Value Date/Time     08/01/2025 0533     02/19/2025 0831    K 4.6 08/01/2025 0533    K 4.4 02/19/2025 0831     08/01/2025 0533     02/19/2025 0831    CO2 28 08/01/2025 0533    CO2 26 02/19/2025 0831    BUN 21 08/01/2025 0533    BUN 19 02/19/2025 0831    CREATININE 1.14 08/01/2025 0533    CREATININE 1.00 02/19/2025 0831    Lab Results   Component Value Date/Time    CALCIUM 9.0 08/01/2025 0533    CALCIUM 9.2 02/19/2025 0831    ALKPHOS 92 07/30/2025 1153    ALKPHOS 85 02/19/2025 0831    AST 24 07/30/2025 1153    AST 27 02/19/2025 0831    ALT 16 07/30/2025 1153     ALT 22 02/19/2025 0831    BILITOT 0.7 07/30/2025 1153    BILITOT 0.5 02/19/2025 0831          Lab Results   Component Value Date/Time    WBC 7.1 08/01/2025 0532    WBC 6.2 02/19/2025 0831    HGB 13.2 (L) 08/01/2025 0532    HGB 12.4 (L) 02/19/2025 0831    HCT 39.6 (L) 08/01/2025 0532    HCT 37.2 (L) 02/19/2025 0831     08/01/2025 0532     02/19/2025 0831     Lab Results   Component Value Date/Time    PROTIME 27.4 (H) 08/01/2025 0532    PROTIME 27.00 02/26/2025 0000    INR 2.5 (H) 08/01/2025 0532    INR 2.50 03/12/2025 0000     Encounter Date: 07/30/25   ECG 12 lead   Result Value    Ventricular Rate 130    QRS Duration 94    QT Interval 306    QTC Calculation(Bazett) 450    R Axis -32    T Axis 83    QRS Count 21    Q Onset 223    T Offset 376    QTC Fredericia 396    Narrative    Atrial fibrillation with rapid ventricular response  Left axis deviation  Septal infarct (cited on or before 30-JUL-2025)  Abnormal ECG  When compared with ECG of 30-JUL-2025 11:35, (unconfirmed)  No significant change was found     No results found for this or any previous visit from the past 1095 days.    Clinical information reviewed:   Tobacco  Allergies  Meds  Problems  Med Hx  Surg Hx   Fam Hx  Soc   Hx      Echo 7/31/25-  CONCLUSIONS:   1. There is normal right ventricular global systolic function.   2. Mildly enlarged right ventricle.   3. The left and right atrium are mildly dilated.   4. Mild to moderate mitral valve regurgitation.   5. The Doppler estimated RVSP is within normal limits at 29 mmHg.   6. Severe aortic valve stenosis. The peak and mean gradients are 56 mmHg and 29 mmHg respectively.   7. There is severe aortic valve cusp calcification.   8. Estimated CVP is normal.   9. The left ventricular systolic function is normal with a visually estimated ejection fraction of 65-70%.    NPO Detail:  No data recorded     Physical Exam    Airway  Mallampati: II     Cardiovascular   Rhythm: irregular  Rate:  abnormal     Dental - normal exam     Pulmonary - normal exam   Abdominal - normal exam           Anesthesia Plan    History of general anesthesia?: yes  History of complications of general anesthesia?: no    ASA 3     MAC     The patient is not a current smoker.  Patient did not smoke on day of procedure.    intravenous induction   Anesthetic plan and risks discussed with patient.           [1]   Past Surgical History:  Procedure Laterality Date    CARDIAC CATHETERIZATION  04/15/2014    Cardiac Cath Procedure Outcome:    CATARACT EXTRACTION Left 07/24/2025    COLONOSCOPY W/ POLYPECTOMY  06/27/2024    EYE SURGERY      HERNIA REPAIR  04/15/2014    Inguinal Hernia Repair   [2]   Past Medical History:  Diagnosis Date    Anticoagulated     Coumadin    Atrial fibrillation     Longstanding/Persistant    Bladder tumor 07/01/2024    Right anterolateral bladder dome  2.1 x 1.7 cm    Cardiac murmur     Cataract, right eye     Colon polyp 06/27/2024    COPD (chronic obstructive pulmonary disease) (Multi)     DDD (degenerative disc disease), lumbar     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    Hematospermia     Hematuria 07/01/2024    History of backache 01/14/2014    History of cataract removal with insertion of prosthetic lens 07/24/2025    left    History of depression 01/14/2014    History of esophageal ulcer 01/14/2014    History of mumps 01/14/2014    History of rectal bleeding     History of rubella 01/14/2014    HLD (hyperlipidemia)     HTN (hypertension)     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    Avascular necrosis of hip, left    Normal cardiac stress test 01/14/2014    On Coumadin for atrial fibrillation     Personal history of other diseases of the circulatory system     History of cardiac disorder    Rhinitis, chronic     Tick bite 04/09/2022    Ulcer 01/14/2014    Undescended testicle     Unspecified abdominal hernia without obstruction or gangrene 01/14/2014    Hernia   [3]   Current  Facility-Administered Medications:     acetaminophen (Tylenol) tablet 650 mg, 650 mg, oral, q6h PRN, GEMMA Naidu    acetaminophen (Tylenol) tablet 650 mg, 650 mg, oral, q6h PRN, GEMMA Naidu    [COMPLETED] amiodarone (Nexterone) 150 mg in dextrose (iso)  mL, 150 mg, intravenous, Once, Stopped at 25 1637 **FOLLOWED BY** [] amiodarone (Nexterone) 360 mg in dextrose,iso-osm 200 mL (1.8 mg/mL) infusion (premix), 1 mg/min, intravenous, Continuous, Stopped at 25 2218 **FOLLOWED BY** amiodarone (Nexterone) 360 mg in dextrose,iso-osm 200 mL (1.8 mg/mL) infusion (premix), 0.5 mg/min, intravenous, Continuous, GEMMA Naidu, Stopped at 25 0536    cholecalciferol (Vitamin D-3) tablet 125 mcg, 125 mcg, oral, Daily, GEMMA Colón, 125 mcg at 25 1112    levothyroxine (Synthroid, Levoxyl) tablet 100 mcg, 100 mcg, oral, Daily, Cruz Wright MD, 100 mcg at 25 0627    lidocaine (Xylocaine) mouth solution 2 %  - Omnicell Override Pull, , , ,     melatonin tablet 9 mg, 9 mg, oral, Nightly PRN, GEMMA Naidu    metoprolol succinate XL (Toprol-XL) 24 hr tablet 50 mg, 50 mg, oral, Nightly, Cruz Wright MD, 50 mg at 25 210    ondansetron (Zofran) injection 4 mg, 4 mg, intravenous, q4h PRN, GEMMA Naidu    pantoprazole (ProtoNix) EC tablet 40 mg, 40 mg, oral, Daily before breakfast **OR** [DISCONTINUED] esomeprazole (NexIUM) suspension 40 mg, 40 mg, nasoduodenal tube, Daily before breakfast **OR** [DISCONTINUED] pantoprazole (Protonix) injection 40 mg, 40 mg, intravenous, Daily before breakfast, GEMMA Naidu    prednisoLONE acetate (Pred-Forte) 1 % ophthalmic suspension 1 drop, 1 drop, Left Eye, 4x daily, GEMMA Colón, 1 drop at 25 0710    sodium chloride 0.9% infusion, 10 mL/hr, intravenous, Continuous PRN, Kushal Stapleton MD    [Held by provider] tamsulosin (Flomax) 24 hr capsule 0.8 mg, 0.8  mg, oral, Nightly, Cruz Wright MD, 0.8 mg at 07/30/25 2345    warfarin (Coumadin) tablet 5 mg, 5 mg, oral, Once per day on Sunday Tuesday Thursday Saturday, GEMMA Colón, 5 mg at 07/31/25 1721    warfarin (Coumadin) tablet 7.5 mg, 7.5 mg, oral, Once per day on Monday Wednesday Friday, GEMMA Colón  [4] No Known Allergies

## 2025-08-01 NOTE — ANESTHESIA POSTPROCEDURE EVALUATION
"Patient: Royce Briggs \"Al\"    Procedure Summary       Date: 08/01/25 Room / Location: [facility] OR    Anesthesia Start: 0850 Anesthesia Stop: 0922    Procedure: TRANSESOPHAGEAL ECHO (CHRISTO) Diagnosis:       Atrial fibrillation, chronic (Multi)      Aortic stenosis      (atrial fibrillation aortic stenosis)    Scheduled Providers:  Responsible Provider: DONNELL Rodriguez    Anesthesia Type: MAC ASA Status: 3            Anesthesia Type: MAC    Vitals Value Taken Time   BP 92/63 08/01/25 09:36   Temp 36 08/01/25 09:39   Pulse 53 08/01/25 09:39   Resp 13 08/01/25 09:39   SpO2 98 % 08/01/25 09:39   Vitals shown include unfiled device data.    Anesthesia Post Evaluation    Patient location during evaluation: PACU  Patient participation: complete - patient participated  Level of consciousness: sleepy but conscious  Pain score: 0  Pain management: adequate  Airway patency: patent  Two or more strategies used to mitigate risk of obstructive sleep apnea  Cardiovascular status: acceptable and stable  Respiratory status: acceptable and nasal cannula  Hydration status: acceptable  Postoperative Nausea and Vomiting: none        There were no known notable events for this encounter.    "

## 2025-08-01 NOTE — PROGRESS NOTES
Subjective Data:  Denies chest pain or dyspnea  AF rate controlled  Overnight Events:    None     Objective Data:  Last Recorded Vitals:  Vitals:    08/01/25 0400 08/01/25 0500 08/01/25 0600 08/01/25 0700   BP: 100/72 100/50 105/81 110/73   BP Location: Right arm Right arm Right arm Right arm   Patient Position: Lying Lying Lying Lying   Pulse: 72 94 93 87   Resp: 14 12 18 13   Temp:       TempSrc:       SpO2: 94% 96% 99% 98%   Weight:   85.9 kg (189 lb 6.4 oz)    Height:           Last Labs:  CBC - 8/1/2025:  5:32 AM  7.1 13.2 222    39.6      CMP - 8/1/2025:  5:33 AM  9.0 7.8 24 --- 0.7   _ 4.6 16 92      PTT - 7/31/2025:  5:46 AM  2.5   27.4 41     TROPHS   Date/Time Value Ref Range Status   07/31/2025 08:31 AM 31 0 - 20 ng/L Final   07/31/2025 05:46 AM 43 0 - 20 ng/L Final   07/30/2025 12:45 PM 60 0 - 20 ng/L Final     BNP   Date/Time Value Ref Range Status   07/31/2025 08:32  0 - 99 pg/mL Final   07/30/2025 11:53  0 - 99 pg/mL Final     HGBA1C   Date/Time Value Ref Range Status   07/30/2025 11:53 AM 5.3 See comment % Final   02/28/2023 08:13 AM 5.6 4.0 - 6.0 % Final     Comment:     Hemoglobin A1C levels are related to mean blood glucose during the   preceding 2-3 months. The relationship table below may be used as a   general guide. Each 1% increase in HGB A1C is a reflection of an   increase in mean glucose of approximately 30 mg/dl.   Reference: Diabetes Care, volume 29, supplement 1 Jan. 2006                        HGB A1C ................. Approx. Mean Glucose   _______________________________________________   6%   ...............................  120 mg/dl   7%   ...............................  150 mg/dl   8%   ...............................  180 mg/dl   9%   ...............................  210 mg/dl   10%  ...............................  240 mg/dl  Performed [address] [place] [id number]     02/03/2022 08:11 AM 5.8 % Final     Comment:          Diagnosis of  Diabetes-Adults   Non-Diabetic: < or = 5.6%   Increased risk for developing diabetes: 5.7-6.4%   Diagnostic of diabetes: > or = 6.5%  .       Monitoring of Diabetes                Age (y)     Therapeutic Goal (%)   Adults:          >18           <7.0   Pediatrics:    13-18           <7.5                   7-12           <8.0                   0- 6            7.5-8.5   American Diabetes Association. Diabetes Care 33(S1), Jan 2010.       LDLCALC   Date/Time Value Ref Range Status   02/19/2025 08:31  mg/dL (calc) Final     Comment:     Reference range: <100     Desirable range <100 mg/dL for primary prevention;    <70 mg/dL for patients with CHD or diabetic patients   with > or = 2 CHD risk factors.     LDL-C is now calculated using the Du   calculation, which is a validated novel method providing   better accuracy than the Friedewald equation in the   estimation of LDL-C.   Inocencio HOOVER et al. SARAHI. 2013;310(19): 2966-1223   (http://education.Mitr[employer]ist/faq/EIM356)     01/18/2024 08:31  <=99 mg/dL Final     Comment:                                 Near   Borderline      AGE      Desirable  Optimal    High     High     Very High     0-19 Y     0 - 109     ---    110-129   >/= 130     ----    20-24 Y     0 - 119     ---    120-159   >/= 160     ----      >24 Y     0 -  99   100-129  130-159   160-189     >/=190     02/28/2023 08:13  65 - 130 MG/DL Final     VLDL   Date/Time Value Ref Range Status   01/18/2024 08:31 AM 28 0 - 40 mg/dL Final   02/03/2022 08:11 AM 21 0 - 40 mg/dL Final   10/02/2020 08:13 AM 23 0 - 40 mg/dL Final   02/20/2020 07:23 AM 22 0 - 40 mg/dL Final      Last I/O:  I/O last 3 completed shifts:  In: 2590 (30.1 mL/kg) [P.O.:590; IV Piggyback:2000]  Out: - (0 mL/kg)   Weight: 85.9 kg   Transthoracic Echo Complete 07/31/2025    Ejection Fractions:  EF   Date/Time Value Ref Range Status   07/31/2025 09:05 AM 68 %      Physical Exam:  Constitutional: Well developed,  awake/alert/oriented x3, no distress, alert and cooperative  Eyes: PERRL, EOMI, clear sclera  ENMT: mucous membranes moist, no apparent injury, no lesions seen  Head/Neck: Neck supple, no apparent injury, thyroid without mass or tenderness, No JVD, trachea midline, no bruits  Respiratory/Thorax: Patent airways, CTAB, normal breath sounds with good chest expansion, thorax symmetric  Cardiovascular: Irregular, rate and rhythm, 3/6 systolic murmurs, 2+ equal pulses of the extremities, normal S 1and S 2  Gastrointestinal: Nondistended, soft, non-tender, no rebound tenderness or guarding, no masses palpable, no organomegaly, +BS, no bruits  Musculoskeletal: ROM intact, no joint swelling, normal strength  Extremities: normal extremities, no cyanosis edema, contusions or wounds, no clubbing  Neurological: alert and oriented x3, intact senses, motor, response and reflexes, normal strength  Lymphatic: No significant lymphadenopathy  Psychological: Appropriate mood and behavior  Skin: Warm and dry, no lesions, no rashes       Assessment/Plan   Problem List Items Addressed This Visit                  Cardiac and Vasculature     * (Principal) Atrial fibrillation (Multi) - Primary     Relevant Medications     metoprolol succinate XL (Toprol-XL) 24 hr tablet 50 mg     Other Relevant Orders     Cardioversion External      Other Visit Diagnoses           Atrial fibrillation with rapid ventricular response (Multi)         Relevant Medications     metoprolol tartrate (Lopressor) injection 5 mg (Completed)     metoprolol succinate XL (Toprol-XL) 24 hr tablet 50 mg     Other Relevant Orders     Transthoracic Echo Complete     Cardioversion External       Atrial fibrillation, chronic (Multi)         Relevant Medications     metoprolol tartrate (Lopressor) injection 5 mg (Completed)     metoprolol succinate XL (Toprol-XL) 24 hr tablet 50 mg     Other Relevant Orders     Transthoracic Echo Complete     Cardioversion External               Patient has been admitted to the hospital with symptomatic rapid atrial fibrillation.     INR was subtherapeutic.  He had hypotension likely due to a combination of rapid atrial fibrillation, amiodarone adverse effect and underlying aortic valve disease with loss of atrial kick, added Lisinopril.     Currently his blood pressure is 112/59 heart rate 120 oxygen saturation is 100%.     I would recommend discontinuing lisinopril due to his severe aortic stenosis, afterload reduction will result in severe hypotension.     Amiodarone drip has been discontinued.  Start amiodarone 200 mg daily.  Continue metoprolol succinate 50 mg daily.     Since he remains hemodynamically stable overnight will perform CHRISTO and cardioversion today.      TT Echocardiogram reviewed.     There is normal right ventricular global systolic function.   2. Mildly enlarged right ventricle.   3. The left and right atrium are mildly dilated.   4. Mild to moderate mitral valve regurgitation.   5. The Doppler estimated RVSP is within normal limits at 29 mmHg.   6. Severe aortic valve stenosis. The peak and mean gradients are 56 mmHg and 29 mmHg respectively.   7. There is severe aortic valve cusp calcification.   8. Estimated CVP is normal.   9. The left ventricular systolic function is normal with a visually estimated ejection fraction of 65-70%.  Old records reviewed.     ICU critical care 60 min  Peripheral IV 07/31/25 20 G Right Antecubital (Active)   Site Assessment Clean;Dry;Intact 08/01/25 0400   Dressing Type Transparent 08/01/25 0400   Dressing Status Clean;Dry 08/01/25 0400   Number of days: 1       Code Status:  Full Code    I spent 25 minutes in the professional and overall care of this patient.        Garo Peralta MD

## 2025-08-01 NOTE — OP NOTE
"* Cardioversion * Operative Note     Date:  2025 OR Location: [place] ICU    Name: Royce Briggs \"Al\", : 1945, Age: 79 y.o., MRN: [id number], Sex: male    Procedure Summary  Anesthesia: Anesthesia type cannot be found on the log.  ASA: ASA status cannot be found on the log.    Procedure Details: Preprocedure diagnosis : Paroxysmal atrial fibrillation    Postprocedure diagnosis : Normal sinus rhythm    Procedure details :  Under conscious sedation with anesthesia supervision on anticoagulation after CHRISTO excluded thrombus but confirmed severe aortic stenosis cardioversion was performed with  200 J biphasic shock.  Postprocedure electrocardiogram showed normal sinus rhythm.      Complications:  None; patient tolerated the procedure well.    Disposition: PACU - hemodynamically stable.  Condition: stable     Attending Attestation: I performed the procedure.    Garo Peralta MD      "

## 2025-08-01 NOTE — CARE PLAN
The patient's goals for the shift include      The clinical goals for the shift include converting to sinus rhythm, HR < 100 BPM, SBP will remain > 90, no S/S of ACS, NPO after midnight, and INR=2-3.    Over the shift, the patient did make progress toward the following goals. Pt remained in AF throughout the night with a controlled rate. Plan to cardiovert patient this morning with cardiology

## 2025-08-01 NOTE — PROGRESS NOTES
08/01/25 1231   Discharge Planning   Living Arrangements Alone   Support Systems Children;Family members   Assistance Needed Patient alert and oriented x 3; Patient says he lives alone in a 2 story house with his dog. There a 2 steps up to the kitchen. Standard steps down to basement and upstairs. He utilizess the main floor. He does have family if needed. He drives and doesn't use any DME. He is independent with ADLS, IADLS, and ambulation.   Type of Residence Private residence   Number of Stairs to Enter Residence 0   Number of Stairs Within Residence 12   Do you have animals or pets at home? Yes   Type of Animals or Pets 1 dog named [facility] or Post Acute Services None   Expected Discharge Disposition Home   Does the patient need discharge transport arranged? No

## 2025-08-02 VITALS
WEIGHT: 189.4 LBS | RESPIRATION RATE: 18 BRPM | HEART RATE: 64 BPM | SYSTOLIC BLOOD PRESSURE: 93 MMHG | TEMPERATURE: 97.3 F | DIASTOLIC BLOOD PRESSURE: 60 MMHG | BODY MASS INDEX: 28.05 KG/M2 | HEIGHT: 69 IN | OXYGEN SATURATION: 99 %

## 2025-08-02 LAB
ANION GAP SERPL CALC-SCNC: 9 MMOL/L (ref 10–20)
BUN SERPL-MCNC: 23 MG/DL (ref 6–23)
CALCIUM SERPL-MCNC: 9.1 MG/DL (ref 8.6–10.3)
CHLORIDE SERPL-SCNC: 104 MMOL/L (ref 98–107)
CO2 SERPL-SCNC: 28 MMOL/L (ref 21–32)
CREAT SERPL-MCNC: 1.03 MG/DL (ref 0.5–1.3)
EGFRCR SERPLBLD CKD-EPI 2021: 74 ML/MIN/1.73M*2
ERYTHROCYTE [DISTWIDTH] IN BLOOD BY AUTOMATED COUNT: 13.2 % (ref 11.5–14.5)
GLUCOSE SERPL-MCNC: 93 MG/DL (ref 74–99)
HCT VFR BLD AUTO: 37.5 % (ref 41–52)
HGB BLD-MCNC: 12.4 G/DL (ref 13.5–17.5)
INR PPP: 2.7 (ref 0.9–1.1)
MCH RBC QN AUTO: 32.6 PG (ref 26–34)
MCHC RBC AUTO-ENTMCNC: 33.1 G/DL (ref 32–36)
MCV RBC AUTO: 99 FL (ref 80–100)
NRBC BLD-RTO: 0 /100 WBCS (ref 0–0)
PLATELET # BLD AUTO: 208 X10*3/UL (ref 150–450)
POTASSIUM SERPL-SCNC: 4.3 MMOL/L (ref 3.5–5.3)
PROTHROMBIN TIME: 30.2 SECONDS (ref 9.8–12.4)
RBC # BLD AUTO: 3.8 X10*6/UL (ref 4.5–5.9)
SODIUM SERPL-SCNC: 137 MMOL/L (ref 136–145)
WBC # BLD AUTO: 6.7 X10*3/UL (ref 4.4–11.3)

## 2025-08-02 PROCEDURE — 2500000002 HC RX 250 W HCPCS SELF ADMINISTERED DRUGS (ALT 637 FOR MEDICARE OP, ALT 636 FOR OP/ED): Mod: IPSPLIT | Performed by: INTERNAL MEDICINE

## 2025-08-02 PROCEDURE — 2500000001 HC RX 250 WO HCPCS SELF ADMINISTERED DRUGS (ALT 637 FOR MEDICARE OP): Mod: IPSPLIT

## 2025-08-02 PROCEDURE — 99239 HOSP IP/OBS DSCHRG MGMT >30: CPT | Performed by: NURSE PRACTITIONER

## 2025-08-02 PROCEDURE — 2500000001 HC RX 250 WO HCPCS SELF ADMINISTERED DRUGS (ALT 637 FOR MEDICARE OP): Mod: IPSPLIT | Performed by: NURSE PRACTITIONER

## 2025-08-02 PROCEDURE — 85610 PROTHROMBIN TIME: CPT | Mod: IPSPLIT | Performed by: NURSE PRACTITIONER

## 2025-08-02 PROCEDURE — 99291 CRITICAL CARE FIRST HOUR: CPT | Performed by: FAMILY MEDICINE

## 2025-08-02 PROCEDURE — 94760 N-INVAS EAR/PLS OXIMETRY 1: CPT | Mod: IPSPLIT

## 2025-08-02 PROCEDURE — 85027 COMPLETE CBC AUTOMATED: CPT | Mod: IPSPLIT | Performed by: NURSE PRACTITIONER

## 2025-08-02 PROCEDURE — 2500000002 HC RX 250 W HCPCS SELF ADMINISTERED DRUGS (ALT 637 FOR MEDICARE OP, ALT 636 FOR OP/ED): Mod: IPSPLIT | Performed by: STUDENT IN AN ORGANIZED HEALTH CARE EDUCATION/TRAINING PROGRAM

## 2025-08-02 PROCEDURE — 80048 BASIC METABOLIC PNL TOTAL CA: CPT | Mod: IPSPLIT | Performed by: NURSE PRACTITIONER

## 2025-08-02 PROCEDURE — 36415 COLL VENOUS BLD VENIPUNCTURE: CPT | Mod: IPSPLIT | Performed by: NURSE PRACTITIONER

## 2025-08-02 RX ORDER — AMIODARONE HYDROCHLORIDE 200 MG/1
200 TABLET ORAL DAILY
Qty: 30 TABLET | Refills: 0 | Status: SHIPPED | OUTPATIENT
Start: 2025-08-03 | End: 2025-11-01

## 2025-08-02 RX ORDER — METOPROLOL SUCCINATE 25 MG/1
25 TABLET, EXTENDED RELEASE ORAL NIGHTLY
Qty: 30 TABLET | Refills: 0 | Status: SHIPPED | OUTPATIENT
Start: 2025-08-03 | End: 2025-11-01

## 2025-08-02 RX ORDER — METOPROLOL SUCCINATE 25 MG/1
25 TABLET, EXTENDED RELEASE ORAL NIGHTLY
Status: DISCONTINUED | OUTPATIENT
Start: 2025-08-03 | End: 2025-08-02 | Stop reason: HOSPADM

## 2025-08-02 RX ORDER — METOPROLOL SUCCINATE 25 MG/1
25 TABLET, EXTENDED RELEASE ORAL NIGHTLY
Status: DISCONTINUED | OUTPATIENT
Start: 2025-08-02 | End: 2025-08-02

## 2025-08-02 RX ORDER — AMIODARONE HYDROCHLORIDE 200 MG/1
200 TABLET ORAL DAILY
Status: DISCONTINUED | OUTPATIENT
Start: 2025-08-03 | End: 2025-08-02 | Stop reason: HOSPADM

## 2025-08-02 RX ADMIN — PREDNISOLONE ACETATE 1 DROP: 10 SUSPENSION/ DROPS OPHTHALMIC at 06:31

## 2025-08-02 RX ADMIN — AMIODARONE HYDROCHLORIDE 100 MG: 200 TABLET ORAL at 08:00

## 2025-08-02 RX ADMIN — LEVOTHYROXINE SODIUM 100 MCG: 100 TABLET ORAL at 06:28

## 2025-08-02 RX ADMIN — CHOLECALCIFEROL TAB 125 MCG (5000 UNIT) 125 MCG: 125 TAB at 08:00

## 2025-08-02 RX ADMIN — PANTOPRAZOLE SODIUM 40 MG: 40 TABLET, DELAYED RELEASE ORAL at 06:28

## 2025-08-02 ASSESSMENT — COGNITIVE AND FUNCTIONAL STATUS - GENERAL
MOBILITY SCORE: 24
DAILY ACTIVITIY SCORE: 24

## 2025-08-02 ASSESSMENT — PAIN SCALES - GENERAL
PAINLEVEL_OUTOF10: 0 - NO PAIN

## 2025-08-02 ASSESSMENT — PAIN - FUNCTIONAL ASSESSMENT: PAIN_FUNCTIONAL_ASSESSMENT: 0-10

## 2025-08-02 NOTE — CARE PLAN
The patient's goals for the shift include      The clinical goals for the shift include Patient to be free from injury throughout this shift      Problem: Discharge Planning  Goal: Discharge to home or other facility with appropriate resources  Outcome: Progressing     Problem: Chronic Conditions and Co-morbidities  Goal: Patient's chronic conditions and co-morbidity symptoms are monitored and maintained or improved  Outcome: Progressing     Problem: Nutrition  Goal: Nutrient intake appropriate for maintaining nutritional needs  Outcome: Progressing     Problem: Arrythmia/Dysrhythmia  Goal: Lab values return to normal range  Outcome: Progressing  Goal: No evidence of post procedure complications  Outcome: Progressing  Goal: Promote self management  Outcome: Progressing  Goal: Serial ECG will return to baseline  Outcome: Progressing  Goal: Verbalize understanding of procedures/devices  Outcome: Progressing  Goal: Vital signs return to baseline  Outcome: Progressing

## 2025-08-02 NOTE — CARE PLAN
The patient's goals for the shift include      The clinical goals for the shift include To maintain sinus rhythm    Patient in SR over night but rivas in the 50s with dips to 48

## 2025-08-02 NOTE — NURSING NOTE
2300 assumed care of patient who resting in bed. Agree with previous assessment  no needs at this time HR remains in the 50s

## 2025-08-02 NOTE — PROGRESS NOTES
"Royce Briggs \"Al\" is a 79 y.o. male on day 3 of admission presenting with Atrial fibrillation (Multi).      Subjective     Royce was laying in bed, denies having any dizziness, shortness of breath, he is eager to go home. Questions answered regarding his discharge today.     Review of systems:  Constitutional: negative for fever, chills, or malaise  Neuro: negative for dizziness, headache, numbness, tingling  ENT: Negative for nasal congestion or sore throat  Resp: negative for shortness of breath, cough, or wheezing  CV: negative for chest pain, palpitations  GI: negative for abd pain, nausea, vomiting or diarrhea  : negative for dysuria, frequency, or urgency  Skin: negative for lesions, wounds, or rash  Musculoskeletal: Negative for weakness, myalgia, or arthralgia  Endocrine: Negative for polyuria or polydipsia    Objective   Constitutional: Well developed, awake/alert/oriented x3, no distress, alert and cooperative  Eyes: PERRL, EOMI, clear sclera  ENMT: mucous membranes moist, no apparent injury, no lesions seen  Head/Neck: Neck supple, no apparent injury, thyroid without mass or tenderness, No JVD, trachea midline, no bruits  Respiratory/Thorax: Patent airways, CTAB, normal breath sounds with good chest expansion, thorax symmetric  Cardiovascular: Regular, rate and rhythm, Grade 5/6 LSB murmurs, 2+ equal pulses of the extremities, normal S 1and S 2  Gastrointestinal: Nondistended, soft, non-tender, no rebound tenderness or guarding, no masses palpable, no organomegaly, +BS, no bruits  Musculoskeletal: ROM intact, no joint swelling, normal strength  Extremities: normal extremities, no cyanosis edema, contusions or wounds, no clubbing  Neurological: alert and oriented x3, intact senses, motor, response and reflexes, normal strength  Lymphatic: No significant lymphadenopathy  Psychological: Appropriate mood and behavior  Skin: Warm and dry, no lesions, no rashes      Last Recorded Vitals  BP 93/60 (BP " "Location: Left arm)   Pulse 59   Temp 36.3 °C (97.3 °F) (Temporal)   Resp 16   Ht 1.753 m (5' 9\")   Wt 85.9 kg (189 lb 6.4 oz)   SpO2 98%   BMI 27.97 kg/m²     Intake/Output last 3 Shifts:  I/O last 3 completed shifts:  In: 693.4 (8.1 mL/kg) [P.O.:290; I.V.:403.4 (4.7 mL/kg)]  Out: - (0 mL/kg)   Weight: 85.9 kg   No intake/output data recorded.    Relevant Results  Scheduled medications  Scheduled Medications[1]  Continuous medications  Continuous Medications[2]  PRN medications  PRN Medications[3]    Results for orders placed or performed during the hospital encounter of 07/30/25 (from the past 24 hours)   Transesophageal Echo (CHRISTO)   Result Value Ref Range    BSA 2.05 m2   ECG 12 lead   Result Value Ref Range    Ventricular Rate 56 BPM    Atrial Rate 56 BPM    RI Interval 148 ms    QRS Duration 94 ms    QT Interval 404 ms    QTC Calculation(Bazett) 389 ms    P Axis 15 degrees    R Axis -39 degrees    T Axis 13 degrees    QRS Count 9 beats    Q Onset 222 ms    P Onset 148 ms    P Offset 199 ms    T Offset 424 ms    QTC Fredericia 395 ms   Protime-INR   Result Value Ref Range    Protime 30.2 (H) 9.8 - 12.4 seconds    INR 2.7 (H) 0.9 - 1.1   Basic Metabolic Panel   Result Value Ref Range    Glucose 93 74 - 99 mg/dL    Sodium 137 136 - 145 mmol/L    Potassium 4.3 3.5 - 5.3 mmol/L    Chloride 104 98 - 107 mmol/L    Bicarbonate 28 21 - 32 mmol/L    Anion Gap 9 (L) 10 - 20 mmol/L    Urea Nitrogen 23 6 - 23 mg/dL    Creatinine 1.03 0.50 - 1.30 mg/dL    eGFR 74 >60 mL/min/1.73m*2    Calcium 9.1 8.6 - 10.3 mg/dL   CBC   Result Value Ref Range    WBC 6.7 4.4 - 11.3 x10*3/uL    nRBC 0.0 0.0 - 0.0 /100 WBCs    RBC 3.80 (L) 4.50 - 5.90 x10*6/uL    Hemoglobin 12.4 (L) 13.5 - 17.5 g/dL    Hematocrit 37.5 (L) 41.0 - 52.0 %    MCV 99 80 - 100 fL    MCH 32.6 26.0 - 34.0 pg    MCHC 33.1 32.0 - 36.0 g/dL    RDW 13.2 11.5 - 14.5 %    Platelets 208 150 - 450 x10*3/uL       Transesophageal Echo (CHRISTO)         Cardioversion External "         Transthoracic Echo Complete   Final Result      XR chest 1 view   Final Result   Mild cardiomegaly. No sign of acute cardiopulmonary disease or   interval change from 08/28/2018.             MACRO:   None        Signed by: Adiel Espinal 7/31/2025 7:27 AM   Dictation workstation:   [id number]          Transthoracic Echo Complete  Result Date: 7/31/2025   [facility], [address] [place] [address]              Tel [phone] and Fax [phone] TRANSTHORACIC ECHOCARDIOGRAM REPORT  Patient Name:       CRESENCIO Carroll Physician:    [id number] Garo Peralta MD Study Date:         7/31/2025           Ordering Provider:    [id number] JONG ALONSO MRN/PID:            [id number]            Fellow: Accession#:         [id number]        Nurse: Date of Birth/Age:  1945 / 79      Sonographer:          Ninfa Barron RDCS                     years Gender assigned at  M                   Additional Staff: Birth: Height:             175.26 cm           Admit Date: Weight:             86.64 kg            Admission Status:     Inpatient -                                                               Routine BSA / BMI:          2.03 m2 / 28.21     Encounter#:           [id number]                     kg/m2 Blood Pressure:     92/49 mmHg          Department Location:  [facility] Study Type:    TRANSTHORACIC ECHO (TTE) COMPLETE Diagnosis/ICD: Unspecified atrial fibrillation-I48.91 Indication:    Atrial Fibrillation CPT Code:      Echo Complete w Full Doppler-[id number] Patient History: Valve Disorders:   Aortic Stenosis. Pertinent History: A-Fib, Murmur and HTN. Study Detail: The following Echo studies were performed: 2D, Doppler, M-Mode and               color flow. Technically challenging study due to respiratory               interference. The patient was awake.   PHYSICIAN INTERPRETATION: Left Ventricle: The left ventricular systolic function is normal with a visually estimated ejection fraction of 65-70%. There is mild concentric left ventricular hypertrophy. There are no regional left ventricular wall motion abnormalities. The left ventricular cavity size is normal. There is mildly increased septal and mildly increased posterior left ventricular wall thickness. There is left ventricular concentric remodeling. Left ventricular diastolic filling cannot be determined due to atrial fibrillation/flutter. Left Atrium: The left atrium is mildly dilated. Right Ventricle: The right ventricle is mildly enlarged. There is normal right ventricular global systolic function. Right Atrium: The right atrium is mildly dilated. Aortic Valve: The aortic valve is trileaflet. The aortic valve area by VTI is 1.07 cmÂ² with a peak velocity of 3.74 m/s. The peak and mean gradients are 56 mmHg and 29 mmHg, respectively with a dimensionless index of 0.31. There is severe aortic valve cusp calcification. There is evidence of severe aortic valve stenosis. There is no evidence of aortic valve regurgitation. Mitral Valve: The mitral valve is normal in structure. There is mild to moderate mitral valve regurgitation. The E Vmax is 0.90 m/s. Tricuspid Valve: The tricuspid valve is structurally normal. There is mild tricuspid regurgitation. The Doppler estimated right ventricular systolic pressure (RVSP) is within normal limits at 29 mmHg. Pulmonic Valve: The pulmonic valve is structurally normal. There is physiologic pulmonic valve regurgitation. Pericardium: There is no pericardial effusion noted. Aorta: The aortic root is normal. Pulmonary Artery: The tricuspid regurgitant velocity is 2.54 m/s, and with an estimated right atrial pressure of 3, the estimated pulmonary artery pressure is mildly elevated with the RVSP at 29 mmHg. Systemic Veins: The inferior vena cava appears normal in size, with IVC  inspiratory collapse greater than 50%.  CONCLUSIONS:  1. There is normal right ventricular global systolic function.  2. Mildly enlarged right ventricle.  3. The left and right atrium are mildly dilated.  4. Mild to moderate mitral valve regurgitation.  5. The Doppler estimated RVSP is within normal limits at 29 mmHg.  6. Severe aortic valve stenosis. The peak and mean gradients are 56 mmHg and 29 mmHg respectively.  7. There is severe aortic valve cusp calcification.  8. Estimated CVP is normal.  9. The left ventricular systolic function is normal with a visually estimated ejection fraction of 65-70%. QUANTITATIVE DATA SUMMARY:  2D MEASUREMENTS:          Normal Ranges: Ao Root d:       2.70 cm  (2.0-3.7cm) LAs:             3.80 cm  (2.7-4.0cm) IVSd:            1.28 cm  (0.6-1.1cm) LVPWd:           1.22 cm  (0.6-1.1cm) LVIDd:           3.71 cm  (3.9-5.9cm) LVIDs:           2.42 cm LV Mass Index:   78 g/m2 LVEDV Index:     36 ml/m2 LV % FS          34.8 %  LEFT ATRIUM:                  Normal Ranges: LA Vol A4C:        52.3 ml    (22+/-6mL/m2) LA Vol A2C:        37.9 ml LA Vol BP:         46.0 ml LA Vol Index A4C:  25.8ml/m2 LA Vol Index A2C:  18.7 ml/m2 LA Vol Index BP:   22.7 ml/m2 LA Area A4C:       17.0 cm2 LA Area A2C:       14.0 cm2 LA Major Axis A4C: 4.7 cm LA Major Axis A2C: 4.4 cm LA Volume Index:   21.0 ml/m2  RIGHT ATRIUM:                 Normal Ranges: RA Vol A4C:        42.7 ml    (8.3-19.5ml) RA Vol Index A4C:  21.1 ml/m2 RA Area A4C:       16.0 cm2 RA Major Axis A4C: 5.1 cm  M-MODE MEASUREMENTS:         Normal Ranges: Ao Root:             3.40 cm (2.0-3.7cm) LAs:                 3.90 cm (2.7-4.0cm)  AORTA MEASUREMENTS:         Normal Ranges: Asc Ao, d:          3.10 cm (2.1-3.4cm)  LV SYSTOLIC FUNCTION:                      Normal Ranges: EF-A4C View:    54 % (>=55%) EF-A2C View:    65 % EF-Biplane:     61 % EF-Visual:      68 % LV EF Reported: 68 %  LV DIASTOLIC FUNCTION:           Normal Ranges: MV  Peak E:             0.90 m/s  (0.7-1.2 m/s) MV e'                  0.107 m/s (>8.0) MV lateral e'          0.10 m/s MV medial e'           0.11 m/s E/e' Ratio:            8.42      (<8.0)  MITRAL VALVE:          Normal Ranges: MV DT:        176 msec (150-240msec)  AORTIC VALVE:                      Normal Ranges: AoV Vmax:                3.74 m/s  (<=1.7m/s) AoV Peak P.0 mmHg (<20mmHg) AoV Mean P.0 mmHg (1.7-11.5mmHg) LVOT Max Giorgio:            1.44 m/s  (<=1.1m/s) AoV VTI:                 76.20 cm  (18-25cm) LVOT VTI:                23.50 cm LVOT Diameter:           2.10 cm   (1.8-2.4cm) AoV Area, VTI:           1.07 cm2  (2.5-5.5cm2) AoV Area,Vmax:           1.33 cm2  (2.5-4.5cm2) AoV Dimensionless Index: 0.31  RIGHT VENTRICLE: RV Basal 3.90 cm RV Mid   2.70 cm RV Major 7.0 cm TAPSE:   23.5 mm RV s'    0.15 m/s  TRICUSPID VALVE/RVSP:          Normal Ranges: Peak TR Velocity:     2.54 m/s Est. RA Pressure:     3 RV Syst Pressure:     29       (< 30mmHg) IVC Diam:             1.36 cm  PULMONIC VALVE:          Normal Ranges: PV Max Giorgio:     1.2 m/s  (0.6-0.9m/s) PV Max P.7 mmHg  [id number] Garo Peralta MD Electronically signed on 2025 at 8:23:27 PM  ** Final **            Assessment/Plan   Assessment & Plan  Atrial fibrillation (Multi)    Hypercholesterolemia    Aortic stenosis    Patient has been admitted to the hospital with symptomatic rapid atrial fibrillation.     INR was subtherapeutic.  He had hypotension likely due to a combination of rapid atrial fibrillation, amiodarone adverse effect and underlying aortic valve disease with loss of atrial kick, added Lisinopril.     Currently his blood pressure is 112/59 heart rate 120 oxygen saturation is 100%.     I would recommend discontinuing lisinopril due to his severe aortic stenosis, afterload reduction will result in severe hypotension.     Amiodarone drip has been discontinued.  Start amiodarone 200 mg daily.   Continue metoprolol succinate 50 mg daily.     Since he remains hemodynamically stable overnight will perform CHRISTO and cardioversion today.       TT Echocardiogram reviewed.     There is normal right ventricular global systolic function.   2. Mildly enlarged right ventricle.   3. The left and right atrium are mildly dilated.   4. Mild to moderate mitral valve regurgitation.   5. The Doppler estimated RVSP is within normal limits at 29 mmHg.   6. Severe aortic valve stenosis. The peak and mean gradients are 56 mmHg and 29 mmHg respectively.   7. There is severe aortic valve cusp calcification.   8. Estimated CVP is normal.   9. The left ventricular systolic function is normal with a visually estimated ejection fraction of 65-70%.  Old records reviewed.    8/8 - Discussed plan of care with Dr. Wyman, increase amiodarone to 200 mg daily, decrease metoprolol succinate to 25 mg. He will be referred to structural heart team for aortic stenosis. Follow up in the office in one week.     KANG Guajardo-CNP        [1] [START ON 8/3/2025] amiodarone, 200 mg, oral, Daily  cholecalciferol, 125 mcg, oral, Daily  levothyroxine, 100 mcg, oral, Daily  metoprolol succinate XL, 25 mg, oral, Nightly  pantoprazole, 40 mg, oral, Daily before breakfast  perflutren lipid microspheres, 0.5-10 mL of dilution, intravenous, Once in imaging  perflutren protein A microsphere, 0.5 mL, intravenous, Once in imaging  prednisoLONE acetate, 1 drop, Left Eye, 4x daily  sulfur hexafluoride microsphr, 2 mL, intravenous, Once in imaging  tamsulosin, 0.8 mg, oral, Nightly  warfarin, 5 mg, oral, Once per day on Sunday Tuesday Thursday Saturday  warfarin, 7.5 mg, oral, Once per day on Monday Wednesday Friday  [2] sodium chloride 0.9%, 10 mL/hr  [3] PRN medications: acetaminophen, acetaminophen, melatonin, ondansetron, sodium chloride 0.9%

## 2025-08-03 LAB
AORTIC VALVE MEAN GRADIENT: 28 MMHG
AORTIC VALVE PEAK VELOCITY: 3.78 M/S
AV PEAK GRADIENT: 57 MMHG
BACTERIA BLD CULT: NORMAL
BACTERIA BLD CULT: NORMAL
EJECTION FRACTION: 70 %

## 2025-08-04 LAB
BACTERIA BLD CULT: NORMAL
BACTERIA BLD CULT: NORMAL

## 2025-08-04 NOTE — DISCHARGE SUMMARY
"Discharge Diagnosis  Atrial fibrillation (Multi)       Discharge Meds     Medication List      START taking these medications     amiodarone 200 mg tablet; Commonly known as: Pacerone; Take 1 tablet   (200 mg) by mouth once daily. Do not fill before August 3, 2025.     CHANGE how you take these medications     metoprolol succinate XL 25 mg 24 hr tablet; Commonly known as:   Toprol-XL; Take 1 tablet (25 mg) by mouth once daily at bedtime. Do not   crush or chew. Do not fill before August 3, 2025.; What changed:   medication strength, how much to take, when to take this, additional   instructions     CONTINUE taking these medications     cholecalciferol 125 mcg (5,000 units) tablet; Commonly known as: Vitamin   D-3   COENZYME Q10 (BULK) MISC   levothyroxine 112 mcg tablet; Commonly known as: Synthroid, Levoxyl;   TAKE 1 TABLET BY MOUTH EVERY DAY IN THE MORNING ON EMPTY STOMACH FOR 90   DAYS   prednisoLONE acetate 1 % ophthalmic suspension; Commonly known as:   Pred-Forte   STAGES MEN'S MULTI-VITAMIN ORAL   tamsulosin 0.4 mg 24 hr capsule; Commonly known as: Flomax; Take 2   capsules (0.8 mg) by mouth once daily.   warfarin 5 mg tablet; Commonly known as: Coumadin; Take as directed. If   you are unsure how to take this medication, talk to your nurse or doctor.     STOP taking these medications     Klayesta 100,000 unit/gram powder; Generic drug: nystatin   lisinopril 2.5 mg tablet       Test Results Pending At Discharge  Pending Labs       Order Current Status    Blood Culture Preliminary result    Blood Culture Preliminary result            Hospital Course   HPI: \"Royce Briggs \"Al\" is a 79 y.o. male with a past medical hx Afib longstanding (successful cardioversion x 1-2006), persistent(coumadin) aortic stenosis-BPH,  COPD (45 pack yr), esophageal ulcer, HLD, HTN, hypothyroid, idiopathic aseptic necrosis left hip, urothelial carcinoma high grade with TURBT 8/24 followed by BCG treatments with Dr. Bowling, " "presenting with feeling poorly while working outside. He came in to take a break and checked his blood pressure and was concerned it was a bit low and came in to the ED. He denies fever, chills, syncope, palpitations, sob or chest pain. He states he only intermittently has these \"spells\" with Afib. He denies urinary symptoms or any focal weakness or deficits.\"    Royce was admitted to Medicine and treated for recurrent atrial fibrillation.  He was given IV amiodarone and treated in the ICU due to hypotension initially.  He was followed by Dr. Wyman and cardioverted back to normal sinus rhythm.   Chronic medical conditions were also addressed and monitored. Labs were closely monitored.  He was discharged in stable condition with the above medication changes and/or additions. Recommendations were made to follow up with pt's PCP in 1-2 weeks. See full inpatient plan below.     Problem list:  Afib - longstanding, persistent (successful cardioversion x 1 - 2006)  HTN  HLD  Aortic Stenosis  - cardiology consult (Garo) appreciate recs   - continue lisinopril, metoprolol, coumadin   - monitor VS closely - hypotensive night of 7/31 and received 500cc's in ED and 1L NS next am   - amiodarone discontinued due to hypotension - switched to metoprolol for rate control   - has had TAVR discussion in the past   - has been on rosuvastatin with mixed compliance   - continue lower dose amiodarone  --- Discharged home after successful cardioversion and continue monitoring  --- Continue amiodarone, follow-up with structural heart team as well as Dr. Wyman     Hypothyroid  BPH  Hx of urothelial carcinoma high grade   --- Continue chronic medical therapies, follow-up as needed    Pertinent Physical Exam At Time of Discharge  Physical Exam  Constitutional:       General: He is not in acute distress.     Appearance: Normal appearance. He is not toxic-appearing.   HENT:      Head: Normocephalic and atraumatic.      Mouth/Throat:    "   Mouth: Mucous membranes are moist.      Eyes:      Extraocular Movements: Extraocular movements intact.      Pupils: Pupils are equal, round, and reactive to light.         Cardiovascular:      Rate and Rhythm: Normal rate and regular rhythm.      Pulses: Normal pulses.      Heart sounds: Normal heart sounds. No murmur heard.     No gallop.   Pulmonary:      Effort: Pulmonary effort is normal. No respiratory distress.      Breath sounds: Normal breath sounds. No wheezing, rhonchi or rales.   Abdominal:      General: Bowel sounds are normal. There is no distension.      Palpations: Abdomen is soft.      Tenderness: There is no abdominal tenderness. There is no guarding or rebound.      Musculoskeletal:         General: No swelling, tenderness, deformity or signs of injury. Normal range of motion.      Cervical back: Normal range of motion and neck supple.      Skin:     General: Skin is warm and dry.      Capillary Refill: Capillary refill takes less than 2 seconds.      Coloration: Skin is not jaundiced.      Findings: No bruising or rash.      Neurological:      General: No focal deficit present.      Mental Status: He is alert and oriented to person, place, and time. Mental status is at baseline.      Cranial Nerves: No cranial nerve deficit.      Sensory: No sensory deficit.      Motor: No weakness.      Gait: Gait normal.      Psychiatric:         Mood and Affect: Mood normal.         Behavior: Behavior normal.         Thought Content: Thought content normal.         Judgment: Judgment normal.     Stable for discharge to home.  Total cumulative time spent in preparation of this discharge including documentation review, coordination of care with the medical team including PT/SW/care coordinators and treating consultants, discussion with patient and pertinent family members and finalization of prescriptions, follow-up appointments, and this discharge summary was approximately 45 minutes.    Outpatient  Follow-Up  Future Appointments   Date Time Provider Department Center   9/25/2025 10:30 AM MD KIERRA BarkerRADHA Stubbs, APRN-CNP

## 2025-08-05 ENCOUNTER — PATIENT OUTREACH (OUTPATIENT)
Dept: PRIMARY CARE | Facility: CLINIC | Age: 80
End: 2025-08-05
Payer: MEDICARE

## 2025-08-05 NOTE — PROGRESS NOTES
Discharge Facility: [facility]  Discharge Diagnosis: Atrial fibrillation   Admission Date: 7/30/2025  Discharge Date: 8/2/2025    PCP Appointment Date: Declined to schedule  Specialist Appointment Date: 8/21/2025 08:40 AM Cataract surgery at [facility]   Appointment with Joel Griffin MD (09/25/2025) Urology  Hospital Encounter and Summary Linked: Yes  ED to Hosp-Admission (Discharged) with Kushal Stapleton MD; Baldomero Rosas MD (07/30/2025)     See discharge assessment below for further details:    Wrap Up  Wrap Up Additional Comments: 79yoM with PMHx s/f Afib longstanding (successful cardioversion x 1-2006), persistent(coumadin), aortic stenosis-BPH, and COPD (45 pack yr) presented after not feeling well after working outside. Patient checked his BP and found it was low so he went to the hospital for an evaluation. Patient admitted with A-fib. Cardiology consulted and meds adjusted. Patient discharged to home with Rx's for amiodarone and decreased dosage of metoprolol XL. At time of call the patient stated he is feeling well and has returned to his baseline. Denied any dizziness, SOB, or CP. Patient verbalized understanding of medications changes made. Patient declined to schedule a primary care hospital follow up because he has multiple appts scheduled for his cataract surgeries. He will call at a later date to schedule. (8/5/2025 10:07 AM)    Medications  Medications reviewed with patient/caregiver?: Yes (8/5/2025 10:07 AM)  Is the patient having any side effects they believe may be caused by any medication additions or changes?: No (8/5/2025 10:07 AM)  Does the patient have all medications ordered at discharge?: Yes (8/5/2025 10:07 AM)  Care Management Interventions: No intervention needed (8/5/2025 10:07 AM)  Prescription Comments: NEW: amiodarone.  CHANGED: metoprolol (8/5/2025 10:07 AM)  Is the patient taking all medications as directed (includes completed medication regime)?: Yes  (8/5/2025 10:07 AM)  Care Management Interventions: Provided patient education (8/5/2025 10:07 AM)  Medication Comments: Verbalized understanding of medication changes. (8/5/2025 10:07 AM)    Appointments  Does the patient have a primary care provider?: Yes (8/5/2025 10:07 AM)  Care Management Interventions: -- (Declined to schedule) (8/5/2025 10:07 AM)  Has the patient kept scheduled appointments due by today?: Not applicable (8/5/2025 10:07 AM)    Self Management  What is the home health agency?: N/A (8/5/2025 10:07 AM)  What Durable Medical Equipment (DME) was ordered?: N/A (8/5/2025 10:07 AM)    Patient Teaching  Does the patient have access to their discharge instructions?: Yes (8/5/2025 10:07 AM)  Care Management Interventions: Reviewed instructions with patient (8/5/2025 10:07 AM)  What is the patient's perception of their health status since discharge?: Returned to baseline/stable (8/5/2025 10:07 AM)  Is the patient/caregiver able to teach back the hierarchy of who to call/visit for symptoms/problems? PCP, Specialist, Home Health nurse, Urgent Care, ED, 911: Yes (8/5/2025 10:07 AM)  Patient/Caregiver Education Comments: Patient verbalized understanding of discharge instructions. Provided contact information for nonurgent questions or concerns. (8/5/2025 10:07 AM)

## 2025-08-09 LAB
ATRIAL RATE: 101 BPM
ATRIAL RATE: 56 BPM
P AXIS: 15 DEGREES
P OFFSET: 199 MS
P ONSET: 148 MS
PR INTERVAL: 148 MS
Q ONSET: 222 MS
Q ONSET: 223 MS
Q ONSET: 224 MS
QRS COUNT: 21 BEATS
QRS COUNT: 23 BEATS
QRS COUNT: 9 BEATS
QRS DURATION: 92 MS
QRS DURATION: 94 MS
QRS DURATION: 94 MS
QT INTERVAL: 298 MS
QT INTERVAL: 306 MS
QT INTERVAL: 404 MS
QTC CALCULATION(BAZETT): 389 MS
QTC CALCULATION(BAZETT): 450 MS
QTC CALCULATION(BAZETT): 456 MS
QTC FREDERICIA: 395 MS
QTC FREDERICIA: 396 MS
QTC FREDERICIA: 396 MS
R AXIS: -31 DEGREES
R AXIS: -32 DEGREES
R AXIS: -39 DEGREES
T AXIS: 13 DEGREES
T AXIS: 83 DEGREES
T AXIS: 84 DEGREES
T OFFSET: 373 MS
T OFFSET: 376 MS
T OFFSET: 424 MS
VENTRICULAR RATE: 130 BPM
VENTRICULAR RATE: 141 BPM
VENTRICULAR RATE: 56 BPM

## 2025-08-11 DIAGNOSIS — J42 CHRONIC BRONCHITIS, UNSPECIFIED CHRONIC BRONCHITIS TYPE (MULTI): Primary | ICD-10-CM

## 2025-08-20 NOTE — ED PROCEDURE NOTE
Procedure  Critical Care    Performed by: Baldomero Rosas MD  Authorized by: Baldomero Rosas MD    Critical care provider statement:     Critical care time (minutes):  30    Critical care time was exclusive of:  Separately billable procedures and treating other patients    Critical care was necessary to treat or prevent imminent or life-threatening deterioration of the following conditions:  Cardiac failure, circulatory failure, CNS failure or compromise, metabolic crisis, renal failure and respiratory failure    Critical care was time spent personally by me on the following activities:  Development of treatment plan with patient or surrogate, discussions with consultants, evaluation of patient's response to treatment, examination of patient, transcutaneous pacing, review of old charts, pulse oximetry, re-evaluation of patient's condition, ordering and review of radiographic studies, ordering and review of laboratory studies and ordering and performing treatments and interventions    Care discussed with: accepting provider at another facility                 Baldomero Rosas MD  08/20/25 0007

## 2025-08-29 DIAGNOSIS — I35.0 NONRHEUMATIC AORTIC (VALVE) STENOSIS: Primary | ICD-10-CM

## 2025-09-03 DIAGNOSIS — I35.0 NONRHEUMATIC AORTIC (VALVE) STENOSIS: Primary | ICD-10-CM

## 2025-09-03 LAB
ALBUMIN SERPL-MCNC: 4.4 G/DL (ref 3.6–5.1)
BUN SERPL-MCNC: 25 MG/DL (ref 7–25)
BUN/CREAT SERPL: ABNORMAL (CALC) (ref 6–22)
CALCIUM SERPL-MCNC: 9.1 MG/DL (ref 8.6–10.3)
CHLORIDE SERPL-SCNC: 105 MMOL/L (ref 98–110)
CO2 SERPL-SCNC: 27 MMOL/L (ref 20–32)
CREAT SERPL-MCNC: 1.25 MG/DL (ref 0.7–1.28)
EGFRCR SERPLBLD CKD-EPI 2021: 59 ML/MIN/1.73M2
GLUCOSE SERPL-MCNC: 101 MG/DL (ref 65–99)
PHOSPHATE SERPL-MCNC: 3.4 MG/DL (ref 2.1–4.3)
POTASSIUM SERPL-SCNC: 4.2 MMOL/L (ref 3.5–5.3)
SODIUM SERPL-SCNC: 140 MMOL/L (ref 135–146)

## (undated) DEVICE — GLOVE, SURGICAL, PROTEXIS PI , 7.5, PF, LF

## (undated) DEVICE — SYRINGE, 60 CC, IRRIGATION, TOOMEY TIP

## (undated) DEVICE — STOPCOCK, SAN ANTONIO, W/MODIFIED FITTING

## (undated) DEVICE — TRAY, DRY PREP, PREMIUM

## (undated) DEVICE — COLLECTION BAG, FLUID, NON-STERILE

## (undated) DEVICE — LOOP, BIPOLAR CUTTING, 24/26 FR, F/URO, 0.35MM, STERILE

## (undated) DEVICE — PREP, SKIN, BETADINE, SOLUTION, 16 OZ

## (undated) DEVICE — IRRIGATION SET, CYSTOSCOPY, TURP, Y, CONTINUOUS, 81 IN

## (undated) DEVICE — SYRINGE, LUER LOCK, 12ML

## (undated) DEVICE — TOWEL PACK, STERILE, 4/PACK, BLUE

## (undated) DEVICE — HOLDER, CATHETER, LEGBAND, ADULT, 2 IN, LF

## (undated) DEVICE — CATHETER, URETHRAL, FOLEY, 2 WAY, 18 FR, 5 CC, SILICONE

## (undated) DEVICE — Device

## (undated) DEVICE — TUBING, SUCTION, CONNECTING, NON-CONDUCTIVE, SURE GRIP CONNECTORS, 3/16 IN X 10 FT

## (undated) DEVICE — BAG, DRAINAGE, ANTI-REFLUX CHAMBER, 2000ML

## (undated) DEVICE — PREP TRAY, SKIN, DRY, W/GLOVES